# Patient Record
Sex: MALE | Race: WHITE | Employment: OTHER | URBAN - METROPOLITAN AREA
[De-identification: names, ages, dates, MRNs, and addresses within clinical notes are randomized per-mention and may not be internally consistent; named-entity substitution may affect disease eponyms.]

---

## 2017-04-11 ENCOUNTER — TELEPHONE (OUTPATIENT)
Dept: INTERNAL MEDICINE | Age: 72
End: 2017-04-11

## 2017-05-10 ENCOUNTER — OFFICE VISIT (OUTPATIENT)
Dept: CARDIOLOGY CLINIC | Age: 72
End: 2017-05-10

## 2017-05-10 VITALS
BODY MASS INDEX: 32.83 KG/M2 | WEIGHT: 235.4 LBS | SYSTOLIC BLOOD PRESSURE: 136 MMHG | DIASTOLIC BLOOD PRESSURE: 86 MMHG | HEART RATE: 84 BPM

## 2017-05-10 DIAGNOSIS — I48.0 PAROXYSMAL ATRIAL FIBRILLATION (HCC): Primary | ICD-10-CM

## 2017-05-10 PROCEDURE — 99204 OFFICE O/P NEW MOD 45 MIN: CPT | Performed by: INTERNAL MEDICINE

## 2017-05-10 PROCEDURE — 93000 ELECTROCARDIOGRAM COMPLETE: CPT | Performed by: INTERNAL MEDICINE

## 2017-05-10 RX ORDER — WARFARIN SODIUM 5 MG/1
5 TABLET ORAL DAILY
Qty: 135 TABLET | Refills: 3 | Status: SHIPPED | OUTPATIENT
Start: 2017-05-10 | End: 2018-06-28 | Stop reason: SDUPTHER

## 2017-05-10 ASSESSMENT — ENCOUNTER SYMPTOMS
ALLERGIC/IMMUNOLOGIC NEGATIVE: 1
EYES NEGATIVE: 1
GASTROINTESTINAL NEGATIVE: 1

## 2017-05-12 ENCOUNTER — TELEPHONE (OUTPATIENT)
Dept: INTERNAL MEDICINE | Age: 72
End: 2017-05-12

## 2017-05-15 ENCOUNTER — TELEPHONE (OUTPATIENT)
Dept: INTERNAL MEDICINE | Age: 72
End: 2017-05-15

## 2017-05-16 ENCOUNTER — OFFICE VISIT (OUTPATIENT)
Dept: INTERNAL MEDICINE | Age: 72
End: 2017-05-16

## 2017-05-16 VITALS
WEIGHT: 236 LBS | DIASTOLIC BLOOD PRESSURE: 70 MMHG | SYSTOLIC BLOOD PRESSURE: 122 MMHG | BODY MASS INDEX: 33.04 KG/M2 | HEIGHT: 71 IN

## 2017-05-16 DIAGNOSIS — I10 ESSENTIAL HYPERTENSION: ICD-10-CM

## 2017-05-16 DIAGNOSIS — M25.561 CHRONIC PAIN OF BOTH KNEES: ICD-10-CM

## 2017-05-16 DIAGNOSIS — M25.562 CHRONIC PAIN OF BOTH KNEES: ICD-10-CM

## 2017-05-16 DIAGNOSIS — E55.9 VITAMIN D DEFICIENCY: ICD-10-CM

## 2017-05-16 DIAGNOSIS — G89.29 CHRONIC PAIN OF BOTH KNEES: ICD-10-CM

## 2017-05-16 DIAGNOSIS — B91 POST-POLIO MUSCLE WEAKNESS: ICD-10-CM

## 2017-05-16 DIAGNOSIS — M25.422 SWELLING OF JOINT, ELBOW, LEFT: ICD-10-CM

## 2017-05-16 DIAGNOSIS — M62.81 POST-POLIO MUSCLE WEAKNESS: ICD-10-CM

## 2017-05-16 DIAGNOSIS — Z11.59 NEED FOR HEPATITIS C SCREENING TEST: ICD-10-CM

## 2017-05-16 DIAGNOSIS — E07.9 THYROID DISEASE: Primary | ICD-10-CM

## 2017-05-16 DIAGNOSIS — I48.0 PAROXYSMAL ATRIAL FIBRILLATION (HCC): ICD-10-CM

## 2017-05-16 DIAGNOSIS — M25.00 HEMARTHROSIS: ICD-10-CM

## 2017-05-16 DIAGNOSIS — E66.09 NON MORBID OBESITY DUE TO EXCESS CALORIES: ICD-10-CM

## 2017-05-16 DIAGNOSIS — E78.00 PURE HYPERCHOLESTEROLEMIA: ICD-10-CM

## 2017-05-16 PROCEDURE — 99214 OFFICE O/P EST MOD 30 MIN: CPT | Performed by: INTERNAL MEDICINE

## 2017-05-16 ASSESSMENT — PATIENT HEALTH QUESTIONNAIRE - PHQ9
SUM OF ALL RESPONSES TO PHQ9 QUESTIONS 1 & 2: 0
2. FEELING DOWN, DEPRESSED OR HOPELESS: 0
SUM OF ALL RESPONSES TO PHQ QUESTIONS 1-9: 0
1. LITTLE INTEREST OR PLEASURE IN DOING THINGS: 0

## 2017-05-17 ENCOUNTER — TELEPHONE (OUTPATIENT)
Dept: CARDIOLOGY CLINIC | Age: 72
End: 2017-05-17

## 2017-05-17 DIAGNOSIS — I48.0 PAROXYSMAL ATRIAL FIBRILLATION (HCC): ICD-10-CM

## 2017-05-17 DIAGNOSIS — I48.0 PAROXYSMAL ATRIAL FIBRILLATION (HCC): Primary | ICD-10-CM

## 2017-05-17 LAB
INR BLD: 1.08 (ref 0.85–1.15)
PROTHROMBIN TIME: 12.2 SEC (ref 9.6–13)

## 2017-05-18 ENCOUNTER — TELEPHONE (OUTPATIENT)
Dept: PHARMACY | Facility: CLINIC | Age: 72
End: 2017-05-18

## 2017-05-25 ENCOUNTER — TELEPHONE (OUTPATIENT)
Dept: INTERNAL MEDICINE | Age: 72
End: 2017-05-25

## 2017-05-25 ENCOUNTER — ANTI-COAG VISIT (OUTPATIENT)
Dept: PHARMACY | Facility: CLINIC | Age: 72
End: 2017-05-25

## 2017-05-25 DIAGNOSIS — I48.0 PAROXYSMAL ATRIAL FIBRILLATION (HCC): ICD-10-CM

## 2017-05-25 PROBLEM — M25.422 SWELLING OF JOINT, ELBOW, LEFT: Status: ACTIVE | Noted: 2017-05-25

## 2017-05-25 LAB — INTERNATIONAL NORMALIZATION RATIO, POC: 2.5

## 2017-05-25 ASSESSMENT — ENCOUNTER SYMPTOMS
BACK PAIN: 1
ABDOMINAL PAIN: 0
CHEST TIGHTNESS: 0
SHORTNESS OF BREATH: 0
RESPIRATORY NEGATIVE: 1

## 2017-05-26 ENCOUNTER — TELEPHONE (OUTPATIENT)
Dept: INTERNAL MEDICINE | Age: 72
End: 2017-05-26

## 2017-06-26 ENCOUNTER — TELEPHONE (OUTPATIENT)
Dept: PHARMACY | Facility: CLINIC | Age: 72
End: 2017-06-26

## 2017-06-27 DIAGNOSIS — E66.09 NON MORBID OBESITY DUE TO EXCESS CALORIES: ICD-10-CM

## 2017-06-27 DIAGNOSIS — E07.9 THYROID DISEASE: ICD-10-CM

## 2017-06-27 DIAGNOSIS — G89.29 CHRONIC PAIN OF BOTH KNEES: ICD-10-CM

## 2017-06-27 DIAGNOSIS — M25.561 CHRONIC PAIN OF BOTH KNEES: ICD-10-CM

## 2017-06-27 DIAGNOSIS — M25.562 CHRONIC PAIN OF BOTH KNEES: ICD-10-CM

## 2017-06-27 DIAGNOSIS — E78.00 PURE HYPERCHOLESTEROLEMIA: ICD-10-CM

## 2017-06-27 DIAGNOSIS — Z11.59 NEED FOR HEPATITIS C SCREENING TEST: ICD-10-CM

## 2017-06-27 DIAGNOSIS — I10 ESSENTIAL HYPERTENSION: ICD-10-CM

## 2017-06-27 DIAGNOSIS — E55.9 VITAMIN D DEFICIENCY: ICD-10-CM

## 2017-06-27 DIAGNOSIS — I48.0 PAROXYSMAL ATRIAL FIBRILLATION (HCC): ICD-10-CM

## 2017-06-27 DIAGNOSIS — M25.00 HEMARTHROSIS: ICD-10-CM

## 2017-06-27 LAB
A/G RATIO: 1.5 (ref 1.1–2.2)
ALBUMIN SERPL-MCNC: 4.3 G/DL (ref 3.4–5)
ALP BLD-CCNC: 82 U/L (ref 40–129)
ALT SERPL-CCNC: 19 U/L (ref 10–40)
ANION GAP SERPL CALCULATED.3IONS-SCNC: 13 MMOL/L (ref 3–16)
AST SERPL-CCNC: 21 U/L (ref 15–37)
BASOPHILS ABSOLUTE: 0.1 K/UL (ref 0–0.2)
BASOPHILS RELATIVE PERCENT: 1.1 %
BILIRUB SERPL-MCNC: 0.5 MG/DL (ref 0–1)
BUN BLDV-MCNC: 12 MG/DL (ref 7–20)
CALCIUM SERPL-MCNC: 10 MG/DL (ref 8.3–10.6)
CHLORIDE BLD-SCNC: 100 MMOL/L (ref 99–110)
CHOLESTEROL, TOTAL: 160 MG/DL (ref 0–199)
CO2: 28 MMOL/L (ref 21–32)
CREAT SERPL-MCNC: 0.5 MG/DL (ref 0.8–1.3)
CREATININE URINE: 121.8 MG/DL (ref 39–259)
EOSINOPHILS ABSOLUTE: 0.2 K/UL (ref 0–0.6)
EOSINOPHILS RELATIVE PERCENT: 2.3 %
GFR AFRICAN AMERICAN: >60
GFR NON-AFRICAN AMERICAN: >60
GLOBULIN: 2.9 G/DL
GLUCOSE BLD-MCNC: 102 MG/DL (ref 70–99)
HCT VFR BLD CALC: 49.6 % (ref 40.5–52.5)
HDLC SERPL-MCNC: 48 MG/DL (ref 40–60)
HEMOGLOBIN: 16.3 G/DL (ref 13.5–17.5)
HEPATITIS C ANTIBODY INTERPRETATION: NORMAL
LDL CHOLESTEROL CALCULATED: 93 MG/DL
LYMPHOCYTES ABSOLUTE: 1.6 K/UL (ref 1–5.1)
LYMPHOCYTES RELATIVE PERCENT: 21.9 %
MCH RBC QN AUTO: 30.6 PG (ref 26–34)
MCHC RBC AUTO-ENTMCNC: 32.8 G/DL (ref 31–36)
MCV RBC AUTO: 93.3 FL (ref 80–100)
MICROALBUMIN UR-MCNC: <1.2 MG/DL
MICROALBUMIN/CREAT UR-RTO: NORMAL MG/G (ref 0–30)
MONOCYTES ABSOLUTE: 0.7 K/UL (ref 0–1.3)
MONOCYTES RELATIVE PERCENT: 9.9 %
NEUTROPHILS ABSOLUTE: 4.8 K/UL (ref 1.7–7.7)
NEUTROPHILS RELATIVE PERCENT: 64.8 %
PDW BLD-RTO: 13.5 % (ref 12.4–15.4)
PLATELET # BLD: 245 K/UL (ref 135–450)
PMV BLD AUTO: 9.7 FL (ref 5–10.5)
POTASSIUM SERPL-SCNC: 5.1 MMOL/L (ref 3.5–5.1)
RBC # BLD: 5.32 M/UL (ref 4.2–5.9)
SODIUM BLD-SCNC: 141 MMOL/L (ref 136–145)
T4 FREE: 1.6 NG/DL (ref 0.9–1.8)
TOTAL PROTEIN: 7.2 G/DL (ref 6.4–8.2)
TRIGL SERPL-MCNC: 97 MG/DL (ref 0–150)
TSH SERPL DL<=0.05 MIU/L-ACNC: 1.6 UIU/ML (ref 0.27–4.2)
VITAMIN D 25-HYDROXY: 22 NG/ML
VLDLC SERPL CALC-MCNC: 19 MG/DL
WBC # BLD: 7.4 K/UL (ref 4–11)

## 2017-07-03 ENCOUNTER — ANTI-COAG VISIT (OUTPATIENT)
Dept: PHARMACY | Facility: CLINIC | Age: 72
End: 2017-07-03

## 2017-07-03 DIAGNOSIS — I48.0 PAROXYSMAL ATRIAL FIBRILLATION (HCC): ICD-10-CM

## 2017-07-03 LAB — INTERNATIONAL NORMALIZATION RATIO, POC: 2.7

## 2017-07-03 RX ORDER — ATENOLOL 50 MG/1
TABLET ORAL
Qty: 90 TABLET | Refills: 0 | Status: SHIPPED | OUTPATIENT
Start: 2017-07-03 | End: 2017-07-07 | Stop reason: SDUPTHER

## 2017-07-07 ENCOUNTER — OFFICE VISIT (OUTPATIENT)
Dept: INTERNAL MEDICINE | Age: 72
End: 2017-07-07

## 2017-07-07 VITALS
WEIGHT: 235 LBS | HEIGHT: 71 IN | DIASTOLIC BLOOD PRESSURE: 70 MMHG | BODY MASS INDEX: 32.9 KG/M2 | SYSTOLIC BLOOD PRESSURE: 118 MMHG

## 2017-07-07 DIAGNOSIS — E07.9 THYROID DISEASE: ICD-10-CM

## 2017-07-07 DIAGNOSIS — I48.0 PAROXYSMAL ATRIAL FIBRILLATION (HCC): ICD-10-CM

## 2017-07-07 DIAGNOSIS — K04.7 TOOTH ABSCESS: ICD-10-CM

## 2017-07-07 DIAGNOSIS — B91 POST-POLIO MUSCLE WEAKNESS: ICD-10-CM

## 2017-07-07 DIAGNOSIS — H57.9 EYE DISEASE: ICD-10-CM

## 2017-07-07 DIAGNOSIS — Z00.00 WELL ADULT EXAM: Primary | ICD-10-CM

## 2017-07-07 DIAGNOSIS — K59.01 SLOW TRANSIT CONSTIPATION: ICD-10-CM

## 2017-07-07 DIAGNOSIS — E66.09 NON MORBID OBESITY DUE TO EXCESS CALORIES: ICD-10-CM

## 2017-07-07 DIAGNOSIS — I10 ESSENTIAL HYPERTENSION: ICD-10-CM

## 2017-07-07 DIAGNOSIS — N20.0 KIDNEY STONES: ICD-10-CM

## 2017-07-07 DIAGNOSIS — L85.3 DRY SKIN: ICD-10-CM

## 2017-07-07 DIAGNOSIS — M62.81 POST-POLIO MUSCLE WEAKNESS: ICD-10-CM

## 2017-07-07 DIAGNOSIS — E78.00 PURE HYPERCHOLESTEROLEMIA: ICD-10-CM

## 2017-07-07 DIAGNOSIS — Z00.00 ROUTINE GENERAL MEDICAL EXAMINATION AT A HEALTH CARE FACILITY: ICD-10-CM

## 2017-07-07 PROCEDURE — 99214 OFFICE O/P EST MOD 30 MIN: CPT | Performed by: INTERNAL MEDICINE

## 2017-07-07 PROCEDURE — G0439 PPPS, SUBSEQ VISIT: HCPCS | Performed by: INTERNAL MEDICINE

## 2017-07-07 RX ORDER — SIMVASTATIN 80 MG
80 TABLET ORAL NIGHTLY
Qty: 90 TABLET | Refills: 3 | Status: SHIPPED | OUTPATIENT
Start: 2017-07-07 | End: 2018-02-28 | Stop reason: SDUPTHER

## 2017-07-07 RX ORDER — ATENOLOL 50 MG/1
TABLET ORAL
Qty: 90 TABLET | Refills: 3 | Status: SHIPPED | OUTPATIENT
Start: 2017-07-07 | End: 2019-07-19

## 2017-07-07 RX ORDER — ATENOLOL 50 MG/1
TABLET ORAL
Qty: 90 TABLET | Refills: 3 | Status: SHIPPED | OUTPATIENT
Start: 2017-07-07 | End: 2017-07-07 | Stop reason: SDUPTHER

## 2017-07-07 ASSESSMENT — ENCOUNTER SYMPTOMS
CHEST TIGHTNESS: 0
SINUS PRESSURE: 0
BACK PAIN: 0
SHORTNESS OF BREATH: 0
WHEEZING: 0
RESPIRATORY NEGATIVE: 1
CONSTIPATION: 1
STRIDOR: 0
ABDOMINAL PAIN: 0

## 2017-07-07 ASSESSMENT — PATIENT HEALTH QUESTIONNAIRE - PHQ9: SUM OF ALL RESPONSES TO PHQ QUESTIONS 1-9: 1

## 2017-07-07 ASSESSMENT — LIFESTYLE VARIABLES
HOW OFTEN DO YOU HAVE SIX OR MORE DRINKS ON ONE OCCASION: 0
HOW MANY STANDARD DRINKS CONTAINING ALCOHOL DO YOU HAVE ON A TYPICAL DAY: 0
AUDIT-C TOTAL SCORE: 4
HOW OFTEN DO YOU HAVE A DRINK CONTAINING ALCOHOL: 4

## 2017-07-07 ASSESSMENT — ANXIETY QUESTIONNAIRES: GAD7 TOTAL SCORE: 1

## 2017-07-31 ENCOUNTER — ANTI-COAG VISIT (OUTPATIENT)
Dept: PHARMACY | Facility: CLINIC | Age: 72
End: 2017-07-31

## 2017-07-31 DIAGNOSIS — I48.0 PAROXYSMAL ATRIAL FIBRILLATION (HCC): ICD-10-CM

## 2017-07-31 LAB — INTERNATIONAL NORMALIZATION RATIO, POC: 2.9

## 2017-08-30 ENCOUNTER — ANTI-COAG VISIT (OUTPATIENT)
Dept: PHARMACY | Facility: CLINIC | Age: 72
End: 2017-08-30

## 2017-08-30 ENCOUNTER — OFFICE VISIT (OUTPATIENT)
Dept: CARDIOLOGY CLINIC | Age: 72
End: 2017-08-30

## 2017-08-30 VITALS
WEIGHT: 237.6 LBS | BODY MASS INDEX: 33.26 KG/M2 | DIASTOLIC BLOOD PRESSURE: 78 MMHG | SYSTOLIC BLOOD PRESSURE: 122 MMHG | OXYGEN SATURATION: 98 % | HEART RATE: 79 BPM | HEIGHT: 71 IN

## 2017-08-30 DIAGNOSIS — I10 ESSENTIAL HYPERTENSION: ICD-10-CM

## 2017-08-30 DIAGNOSIS — I48.0 PAROXYSMAL ATRIAL FIBRILLATION (HCC): ICD-10-CM

## 2017-08-30 DIAGNOSIS — I48.91 ATRIAL FIBRILLATION, UNSPECIFIED TYPE (HCC): Primary | ICD-10-CM

## 2017-08-30 LAB — INTERNATIONAL NORMALIZATION RATIO, POC: 3.1

## 2017-08-30 PROCEDURE — 93000 ELECTROCARDIOGRAM COMPLETE: CPT | Performed by: INTERNAL MEDICINE

## 2017-08-30 PROCEDURE — 99214 OFFICE O/P EST MOD 30 MIN: CPT | Performed by: INTERNAL MEDICINE

## 2017-09-03 DIAGNOSIS — N20.0 KIDNEY STONES: ICD-10-CM

## 2017-09-03 DIAGNOSIS — B91 POST-POLIO MUSCLE WEAKNESS: ICD-10-CM

## 2017-09-03 DIAGNOSIS — M62.81 POST-POLIO MUSCLE WEAKNESS: ICD-10-CM

## 2017-09-03 DIAGNOSIS — E07.9 THYROID DISEASE: ICD-10-CM

## 2017-09-03 DIAGNOSIS — K04.7 TOOTH ABSCESS: ICD-10-CM

## 2017-09-05 RX ORDER — SIMVASTATIN 80 MG
TABLET ORAL
Qty: 90 TABLET | Refills: 3 | Status: SHIPPED | OUTPATIENT
Start: 2017-09-05 | End: 2018-07-02 | Stop reason: SDUPTHER

## 2017-09-20 ENCOUNTER — ANTI-COAG VISIT (OUTPATIENT)
Dept: PHARMACY | Facility: CLINIC | Age: 72
End: 2017-09-20

## 2017-09-20 DIAGNOSIS — I48.91 ATRIAL FIBRILLATION, UNSPECIFIED TYPE (HCC): ICD-10-CM

## 2017-09-20 LAB — INTERNATIONAL NORMALIZATION RATIO, POC: 3.1

## 2017-10-18 ENCOUNTER — ANTI-COAG VISIT (OUTPATIENT)
Dept: PHARMACY | Facility: CLINIC | Age: 72
End: 2017-10-18

## 2017-10-18 DIAGNOSIS — I48.91 ATRIAL FIBRILLATION, UNSPECIFIED TYPE (HCC): ICD-10-CM

## 2017-10-18 LAB — INTERNATIONAL NORMALIZATION RATIO, POC: 2.8

## 2017-10-18 NOTE — PROGRESS NOTES
~10-15 years for a-fib, and reports typically stable INR levels and dosing regimen. Repeat INR in 4 weeks (patient prefers monthly visits due to cost burden). Patient was instructed to maintain consistent vitamin K intake and call with any bleeding, medication changes, or fever/vomiting/diarrhea. Next Clinic Appointment:  11/15    Please call Kiah at [phone] with any questions. Thanks! Yoselin Xavier.  Maycol Moore, PharmD  [facility]  Ph: [phone]  10/18/2017 9:28 AM

## 2017-11-01 ENCOUNTER — HOSPITAL ENCOUNTER (OUTPATIENT)
Dept: OTHER | Age: 72
Discharge: HOME | End: 2017-11-30
Attending: INTERNAL MEDICINE | Admitting: INTERNAL MEDICINE

## 2017-11-15 ENCOUNTER — ANTI-COAG VISIT (OUTPATIENT)
Dept: PHARMACY | Facility: CLINIC | Age: 72
End: 2017-11-15

## 2017-11-15 DIAGNOSIS — I48.91 ATRIAL FIBRILLATION, UNSPECIFIED TYPE (HCC): ICD-10-CM

## 2017-11-15 LAB — INTERNATIONAL NORMALIZATION RATIO, POC: 2.2

## 2017-11-15 NOTE — PROGRESS NOTES
ANTICOAGULATION SERVICE    Dana Brink is a 67 y.o. male with PMHx significant for A-fib, HTN, HLD who presents to clinic 11/15/2017 for anticoagulation monitoring and adjustment. Anticoagulation Indication(s):  Afib    Referring Physician:  Dr. Wilks Favorite  Goal INR Range:  2-3  Duration of Anticoagulation Therapy:  Indefinite  Time of day dose taken:  PM  Product patient has at home:  warfarin 5 mg (peach)      INR Summary                           Warfarin regimen (mg)  Date INR   A/P   Sun Mon Tue Wed Thu Fri Sat Mg/wk  11/15 2.2 At goal, no change 5 7.5 5 5 5 5 5 37.5  10/18 2.8 At goal, no change 5 7.5 5 5 5 5 5 37.5  9/20 3.1 Above goal, decrease 5 7.5 5 5 5 5 5 37.5  8/30 3.1 Above goal, decrease 5 7.5 5 5 5 7.5 5 40  7/31 2.9 At goal, no change 5 7.5 5 7.5 5 7.5 5 42.5  7/3 2.7 At goal, no change 5 7.5 5 7.5 5 7.5 5 42.5  5/25 2.5 At goal, no change 5 7.5 5 7.5 5 7.5 5 42.5    Patient History:  Recent hospitalizations/HC visits None since last visit   Recent medication changes None reported today   Medications taken regularly that may interact with warfarin or alter INR ASA 81 mg, levothyroxine   Warfarin dose taken as prescribed Yes - does not use pillbox, but has been taking warfarin for ~10-15 years for a-fib and reports compliance   Signs/symptoms of bleeding No h/o major bleeding requiring ED visit or hospitalization   Vitamin K intake Normally has ~1-2 serving of green, leafy vegetables per week (usually broccoli once per week)   Recent vomiting/diarrhea/fever, changes in weight or activity level None reported   Tobacco or alcohol use Patient reports quitting smoking 47 years ago  Patient reports having 1-2 beers or shot of whiskey per day   Upcoming surgeries or procedures None reported     Assessment/Plan:  Patient's INR was therapeutic today (2.2). Nikos Cisneros was instructed to continue warfarin 7.5 mg on Mondays, and 5 mg all other days.   Patient states he has been taking warfarin for ~10-15 years for a-fib, and reports typically stable INR levels and dosing regimen. Repeat INR in 4 weeks (patient prefers monthly visits due to cost burden). Patient was instructed to maintain consistent vitamin K intake and call with any bleeding, medication changes, or fever/vomiting/diarrhea. Next Clinic Appointment:  12/13    Please call Kiah at [phone] with any questions. Thanks! Mehul Perez, PharmD  [facility]  Ph: [phone]  11/15/2017 9:30 AM

## 2017-12-01 ENCOUNTER — HOSPITAL ENCOUNTER (OUTPATIENT)
Dept: OTHER | Age: 72
Discharge: HOME | End: 2017-12-31
Attending: INTERNAL MEDICINE | Admitting: INTERNAL MEDICINE

## 2017-12-13 ENCOUNTER — ANTI-COAG VISIT (OUTPATIENT)
Dept: PHARMACY | Facility: CLINIC | Age: 72
End: 2017-12-13

## 2017-12-13 DIAGNOSIS — I48.91 ATRIAL FIBRILLATION, UNSPECIFIED TYPE (HCC): ICD-10-CM

## 2017-12-13 LAB — INTERNATIONAL NORMALIZATION RATIO, POC: 2.4

## 2017-12-13 NOTE — PROGRESS NOTES
Patient states he has been taking warfarin for ~10-15 years for a-fib, and reports typically stable INR levels and dosing regimen. Repeat INR in 4 weeks (patient prefers monthly visits due to cost burden). Patient was instructed to maintain consistent vitamin K intake and call with any bleeding, medication changes, or fever/vomiting/diarrhea. Next Clinic Appointment:  1/17    Please call Kiah at [phone] with any questions. Thanks! Ree Majors.  Christiana Bethea, PharmD  [facility]  Ph: [phone]  12/13/2017 9:53 AM

## 2018-01-01 ENCOUNTER — HOSPITAL ENCOUNTER (OUTPATIENT)
Dept: OTHER | Age: 73
Discharge: HOME | End: 2018-01-31
Attending: INTERNAL MEDICINE | Admitting: INTERNAL MEDICINE

## 2018-01-15 ENCOUNTER — TELEPHONE (OUTPATIENT)
Dept: INTERNAL MEDICINE | Age: 73
End: 2018-01-15

## 2018-01-15 NOTE — LETTER
[facility] [address]  [address], [address] [place] [address]  Phone: [phone]  Fax: [phone]    Morena Kemp MD        January 15, 2018     Patient: Lindsey Croft   YOB: 1945   Date of Visit: 1/15/2018       To Whom It May Concern: It is my medical opinion that Ksenia Grayson requires a handicap placard due to severe bilateral weakness/pain. This prevents him from walking more than 1 block. and is a permanent condition. .    If you have any questions or concerns, please don't hesitate to call.     Sincerely,        Morena Kemp MD   [facility] license #[id number]

## 2018-01-17 ENCOUNTER — ANTI-COAG VISIT (OUTPATIENT)
Dept: PHARMACY | Facility: CLINIC | Age: 73
End: 2018-01-17

## 2018-01-17 DIAGNOSIS — I48.91 ATRIAL FIBRILLATION, UNSPECIFIED TYPE (HCC): ICD-10-CM

## 2018-01-17 LAB — INTERNATIONAL NORMALIZATION RATIO, POC: 2.1

## 2018-01-17 NOTE — PROGRESS NOTES
warfarin 7.5 mg on Mondays, and 5 mg all other days. Patient states he has been taking warfarin for ~10-15 years for a-fib, and reports typically stable INR levels and dosing regimen. Repeat INR in 6 weeks (patient prefers extended visits due to cost burden). Patient was instructed to maintain consistent vitamin K intake and call with any bleeding, medication changes, or fever/vomiting/diarrhea. Next Clinic Appointment:  2/28 - coordinate with cardio visit    Please call Kiah at [phone] with any questions. Thanks! Rosangela Rhodes.  Ximena Slater, PharmD  [facility]  Ph: [phone]  1/17/2018 9:41 AM

## 2018-02-01 ENCOUNTER — HOSPITAL ENCOUNTER (OUTPATIENT)
Dept: OTHER | Age: 73
Discharge: HOME | End: 2018-02-28
Attending: INTERNAL MEDICINE | Admitting: INTERNAL MEDICINE

## 2018-02-27 DIAGNOSIS — I48.91 ATRIAL FIBRILLATION, UNSPECIFIED TYPE (HCC): Primary | ICD-10-CM

## 2018-02-28 ENCOUNTER — ANTI-COAG VISIT (OUTPATIENT)
Dept: PHARMACY | Facility: CLINIC | Age: 73
End: 2018-02-28

## 2018-02-28 ENCOUNTER — OFFICE VISIT (OUTPATIENT)
Dept: CARDIOLOGY CLINIC | Age: 73
End: 2018-02-28

## 2018-02-28 VITALS
DIASTOLIC BLOOD PRESSURE: 72 MMHG | OXYGEN SATURATION: 97 % | WEIGHT: 244 LBS | HEIGHT: 72 IN | HEART RATE: 93 BPM | RESPIRATION RATE: 16 BRPM | SYSTOLIC BLOOD PRESSURE: 110 MMHG | BODY MASS INDEX: 33.05 KG/M2

## 2018-02-28 DIAGNOSIS — I48.91 ATRIAL FIBRILLATION, UNSPECIFIED TYPE (HCC): ICD-10-CM

## 2018-02-28 DIAGNOSIS — E07.9 THYROID DISEASE: ICD-10-CM

## 2018-02-28 DIAGNOSIS — I48.91 ATRIAL FIBRILLATION, UNSPECIFIED TYPE (HCC): Primary | ICD-10-CM

## 2018-02-28 LAB — INTERNATIONAL NORMALIZATION RATIO, POC: 2.5

## 2018-02-28 PROCEDURE — 99213 OFFICE O/P EST LOW 20 MIN: CPT | Performed by: INTERNAL MEDICINE

## 2018-02-28 NOTE — PROGRESS NOTES
89           No diagnosis found. Review of Systems   Constitutional: Negative. HENT: Negative. Eyes: Negative. Cardiovascular: Negative. Gastrointestinal: Negative. Endocrine: Negative. Genitourinary: Negative. Musculoskeletal: Negative. Skin: Negative. Allergic/Immunologic: Negative. Neurological: Negative. Hematological: Negative. Psychiatric/Behavioral: Negative. Objective:   Physical Exam   Constitutional: He is oriented to person, place, and time. He appears well-developed and well-nourished. HENT:   Head: Normocephalic and atraumatic. Eyes: EOM are normal. Pupils are equal, round, and reactive to light. Neck: Normal range of motion. Neck supple. Cardiovascular: Normal rate and regular rhythm. Exam reveals no friction rub. No murmur heard. Pulmonary/Chest: Effort normal and breath sounds normal.   Abdominal: Soft. Bowel sounds are normal.   Musculoskeletal: Normal range of motion. He exhibits no edema or deformity. Neurological: He is alert and oriented to person, place, and time. Skin: Skin is warm and dry. Psychiatric: He has a normal mood and affect. His behavior is normal. Thought content normal.       Assessment:      Patient Active Problem List   Diagnosis    Thyroid disease    Constipation    Post-polio muscle weakness    Kidney stones    Atrial fibrillation (HCC)    Hyperlipidemia    Eye disease    Chronic pain of both knees    Testosterone deficiency    Fistula-in-ano    Hypertension    Non morbid obesity due to excess calories    Swelling of joint, elbow, left    Dry skin           Plan:      History of graves disease and had orbital decompression with resultant double vision. Had full polio. Has AF. ECG with late transition. Takes synthroid. SOB:  Seems controlled  Cholesterol:  LDL 93 in 6/17 and takes simvastatin 80 mg. Snores occasionally but doesn't exhibit all KATARINA.

## 2018-03-01 ENCOUNTER — HOSPITAL ENCOUNTER (OUTPATIENT)
Dept: OTHER | Age: 73
Discharge: HOME | End: 2018-03-31
Attending: INTERNAL MEDICINE | Admitting: INTERNAL MEDICINE

## 2018-04-11 ENCOUNTER — TELEPHONE (OUTPATIENT)
Dept: PHARMACY | Facility: CLINIC | Age: 73
End: 2018-04-11

## 2018-04-11 DIAGNOSIS — I48.91 ATRIAL FIBRILLATION, UNSPECIFIED TYPE (HCC): ICD-10-CM

## 2018-04-11 DIAGNOSIS — K60.3 FISTULA-IN-ANO: Primary | ICD-10-CM

## 2018-04-12 ENCOUNTER — TELEPHONE (OUTPATIENT)
Dept: INTERNAL MEDICINE | Age: 73
End: 2018-04-12

## 2018-04-12 DIAGNOSIS — I48.91 ATRIAL FIBRILLATION, UNSPECIFIED TYPE (HCC): ICD-10-CM

## 2018-04-12 DIAGNOSIS — K60.3 FISTULA-IN-ANO: ICD-10-CM

## 2018-04-13 ENCOUNTER — TELEPHONE (OUTPATIENT)
Dept: CARDIOLOGY CLINIC | Age: 73
End: 2018-04-13

## 2018-04-13 ENCOUNTER — OFFICE VISIT (OUTPATIENT)
Dept: INTERNAL MEDICINE | Age: 73
End: 2018-04-13

## 2018-04-13 VITALS
SYSTOLIC BLOOD PRESSURE: 138 MMHG | HEIGHT: 72 IN | DIASTOLIC BLOOD PRESSURE: 78 MMHG | WEIGHT: 238 LBS | BODY MASS INDEX: 32.23 KG/M2

## 2018-04-13 DIAGNOSIS — E66.09 NON MORBID OBESITY DUE TO EXCESS CALORIES: ICD-10-CM

## 2018-04-13 DIAGNOSIS — M62.81 POST-POLIO MUSCLE WEAKNESS: ICD-10-CM

## 2018-04-13 DIAGNOSIS — I48.91 ATRIAL FIBRILLATION, UNSPECIFIED TYPE (HCC): ICD-10-CM

## 2018-04-13 DIAGNOSIS — B91 POST-POLIO MUSCLE WEAKNESS: ICD-10-CM

## 2018-04-13 DIAGNOSIS — E55.9 VITAMIN D DEFICIENCY: ICD-10-CM

## 2018-04-13 DIAGNOSIS — I10 ESSENTIAL HYPERTENSION: ICD-10-CM

## 2018-04-13 DIAGNOSIS — E07.9 THYROID DISEASE: ICD-10-CM

## 2018-04-13 PROCEDURE — 99214 OFFICE O/P EST MOD 30 MIN: CPT | Performed by: INTERNAL MEDICINE

## 2018-04-13 ASSESSMENT — ENCOUNTER SYMPTOMS
BLOOD IN STOOL: 1
RESPIRATORY NEGATIVE: 1
CHEST TIGHTNESS: 0
COUGH: 0
ABDOMINAL PAIN: 0
SHORTNESS OF BREATH: 0

## 2018-06-01 ENCOUNTER — NURSE ONLY (OUTPATIENT)
Dept: CARDIOLOGY CLINIC | Age: 73
End: 2018-06-01

## 2018-06-01 ENCOUNTER — OFFICE VISIT (OUTPATIENT)
Dept: CARDIOLOGY CLINIC | Age: 73
End: 2018-06-01

## 2018-06-01 VITALS
OXYGEN SATURATION: 95 % | HEART RATE: 75 BPM | SYSTOLIC BLOOD PRESSURE: 110 MMHG | BODY MASS INDEX: 33.01 KG/M2 | WEIGHT: 240 LBS | DIASTOLIC BLOOD PRESSURE: 80 MMHG

## 2018-06-01 DIAGNOSIS — I48.91 ATRIAL FIBRILLATION, UNSPECIFIED TYPE (HCC): Primary | ICD-10-CM

## 2018-06-01 PROCEDURE — 93000 ELECTROCARDIOGRAM COMPLETE: CPT | Performed by: INTERNAL MEDICINE

## 2018-06-01 PROCEDURE — 99214 OFFICE O/P EST MOD 30 MIN: CPT | Performed by: INTERNAL MEDICINE

## 2018-06-06 RX ORDER — WARFARIN SODIUM 2 MG/1
2 TABLET ORAL DAILY
Qty: 90 TABLET | Refills: 5 | Status: SHIPPED | OUTPATIENT
Start: 2018-06-06 | End: 2020-01-09

## 2018-06-07 ENCOUNTER — ANTI-COAG VISIT (OUTPATIENT)
Dept: PHARMACY | Facility: CLINIC | Age: 73
End: 2018-06-07

## 2018-06-07 ENCOUNTER — HOSPITAL ENCOUNTER (OUTPATIENT)
Dept: OTHER | Age: 73
Discharge: HOME | End: 2018-06-30
Attending: INTERNAL MEDICINE | Admitting: INTERNAL MEDICINE

## 2018-06-07 DIAGNOSIS — I48.91 ATRIAL FIBRILLATION, UNSPECIFIED TYPE (HCC): ICD-10-CM

## 2018-06-07 LAB — INTERNATIONAL NORMALIZATION RATIO, POC: 1.2

## 2018-06-20 ENCOUNTER — ANTI-COAG VISIT (OUTPATIENT)
Dept: PHARMACY | Facility: CLINIC | Age: 73
End: 2018-06-20

## 2018-06-20 DIAGNOSIS — E07.9 THYROID DISEASE: ICD-10-CM

## 2018-06-20 DIAGNOSIS — E55.9 VITAMIN D DEFICIENCY: ICD-10-CM

## 2018-06-20 DIAGNOSIS — I10 ESSENTIAL HYPERTENSION: ICD-10-CM

## 2018-06-20 DIAGNOSIS — I48.91 ATRIAL FIBRILLATION, UNSPECIFIED TYPE (HCC): ICD-10-CM

## 2018-06-20 DIAGNOSIS — M62.81 POST-POLIO MUSCLE WEAKNESS: ICD-10-CM

## 2018-06-20 DIAGNOSIS — E66.09 NON MORBID OBESITY DUE TO EXCESS CALORIES: ICD-10-CM

## 2018-06-20 DIAGNOSIS — B91 POST-POLIO MUSCLE WEAKNESS: ICD-10-CM

## 2018-06-20 LAB
A/G RATIO: 1.5 (ref 1.1–2.2)
ALBUMIN SERPL-MCNC: 4.1 G/DL (ref 3.4–5)
ALP BLD-CCNC: 71 U/L (ref 40–129)
ALT SERPL-CCNC: 18 U/L (ref 10–40)
ANION GAP SERPL CALCULATED.3IONS-SCNC: 14 MMOL/L (ref 3–16)
AST SERPL-CCNC: 20 U/L (ref 15–37)
BASOPHILS ABSOLUTE: 0.1 K/UL (ref 0–0.2)
BASOPHILS RELATIVE PERCENT: 1.1 %
BILIRUB SERPL-MCNC: 0.5 MG/DL (ref 0–1)
BUN BLDV-MCNC: 15 MG/DL (ref 7–20)
CALCIUM SERPL-MCNC: 9.6 MG/DL (ref 8.3–10.6)
CHLORIDE BLD-SCNC: 100 MMOL/L (ref 99–110)
CHOLESTEROL, TOTAL: 157 MG/DL (ref 0–199)
CO2: 27 MMOL/L (ref 21–32)
CREAT SERPL-MCNC: 0.6 MG/DL (ref 0.8–1.3)
EOSINOPHILS ABSOLUTE: 0.2 K/UL (ref 0–0.6)
EOSINOPHILS RELATIVE PERCENT: 3.2 %
GFR AFRICAN AMERICAN: >60
GFR NON-AFRICAN AMERICAN: >60
GLOBULIN: 2.7 G/DL
GLUCOSE BLD-MCNC: 105 MG/DL (ref 70–99)
HCT VFR BLD CALC: 47 % (ref 40.5–52.5)
HDLC SERPL-MCNC: 52 MG/DL (ref 40–60)
HEMOGLOBIN: 16.4 G/DL (ref 13.5–17.5)
INTERNATIONAL NORMALIZATION RATIO, POC: 1.2
LDL CHOLESTEROL CALCULATED: 83 MG/DL
LYMPHOCYTES ABSOLUTE: 1.8 K/UL (ref 1–5.1)
LYMPHOCYTES RELATIVE PERCENT: 23.5 %
MCH RBC QN AUTO: 32.1 PG (ref 26–34)
MCHC RBC AUTO-ENTMCNC: 34.9 G/DL (ref 31–36)
MCV RBC AUTO: 92 FL (ref 80–100)
MONOCYTES ABSOLUTE: 0.7 K/UL (ref 0–1.3)
MONOCYTES RELATIVE PERCENT: 9.6 %
NEUTROPHILS ABSOLUTE: 4.8 K/UL (ref 1.7–7.7)
NEUTROPHILS RELATIVE PERCENT: 62.6 %
PDW BLD-RTO: 13.5 % (ref 12.4–15.4)
PLATELET # BLD: 236 K/UL (ref 135–450)
PMV BLD AUTO: 9.5 FL (ref 5–10.5)
POTASSIUM SERPL-SCNC: 4.5 MMOL/L (ref 3.5–5.1)
RBC # BLD: 5.12 M/UL (ref 4.2–5.9)
SODIUM BLD-SCNC: 141 MMOL/L (ref 136–145)
T3 FREE: 2.9 PG/ML (ref 2.3–4.2)
T4 FREE: 1.6 NG/DL (ref 0.9–1.8)
TOTAL PROTEIN: 6.8 G/DL (ref 6.4–8.2)
TRIGL SERPL-MCNC: 112 MG/DL (ref 0–150)
TSH SERPL DL<=0.05 MIU/L-ACNC: 1.49 UIU/ML (ref 0.27–4.2)
VITAMIN D 25-HYDROXY: 40.1 NG/ML
VLDLC SERPL CALC-MCNC: 22 MG/DL
WBC # BLD: 7.6 K/UL (ref 4–11)

## 2018-06-25 RX ORDER — WARFARIN SODIUM 5 MG/1
5 TABLET ORAL DAILY
Qty: 135 TABLET | Refills: 3 | Status: CANCELLED | OUTPATIENT
Start: 2018-06-25

## 2018-06-27 ENCOUNTER — TELEPHONE (OUTPATIENT)
Dept: INTERNAL MEDICINE | Age: 73
End: 2018-06-27

## 2018-06-28 ENCOUNTER — TELEPHONE (OUTPATIENT)
Dept: INTERNAL MEDICINE | Age: 73
End: 2018-06-28

## 2018-06-28 RX ORDER — WARFARIN SODIUM 5 MG/1
5 TABLET ORAL DAILY
Qty: 135 TABLET | Refills: 2 | Status: SHIPPED | OUTPATIENT
Start: 2018-06-28 | End: 2019-12-16 | Stop reason: SDUPTHER

## 2018-07-01 ENCOUNTER — HOSPITAL ENCOUNTER (OUTPATIENT)
Dept: OTHER | Age: 73
Discharge: HOME | End: 2018-07-01
Attending: INTERNAL MEDICINE | Admitting: INTERNAL MEDICINE

## 2018-07-02 ENCOUNTER — OFFICE VISIT (OUTPATIENT)
Dept: INTERNAL MEDICINE | Age: 73
End: 2018-07-02

## 2018-07-02 ENCOUNTER — ANTI-COAG VISIT (OUTPATIENT)
Dept: PHARMACY | Facility: CLINIC | Age: 73
End: 2018-07-02

## 2018-07-02 VITALS
SYSTOLIC BLOOD PRESSURE: 128 MMHG | HEIGHT: 71 IN | BODY MASS INDEX: 33.32 KG/M2 | DIASTOLIC BLOOD PRESSURE: 80 MMHG | WEIGHT: 238 LBS

## 2018-07-02 DIAGNOSIS — B91 POST-POLIO MUSCLE WEAKNESS: ICD-10-CM

## 2018-07-02 DIAGNOSIS — M25.562 CHRONIC PAIN OF BOTH KNEES: ICD-10-CM

## 2018-07-02 DIAGNOSIS — E66.09 NON MORBID OBESITY DUE TO EXCESS CALORIES: ICD-10-CM

## 2018-07-02 DIAGNOSIS — Z00.00 WELL ADULT EXAM: Primary | ICD-10-CM

## 2018-07-02 DIAGNOSIS — M62.81 POST-POLIO MUSCLE WEAKNESS: ICD-10-CM

## 2018-07-02 DIAGNOSIS — L30.9 DERMATITIS: ICD-10-CM

## 2018-07-02 DIAGNOSIS — N20.0 KIDNEY STONES: ICD-10-CM

## 2018-07-02 DIAGNOSIS — E07.9 THYROID DISEASE: ICD-10-CM

## 2018-07-02 DIAGNOSIS — E78.00 PURE HYPERCHOLESTEROLEMIA: ICD-10-CM

## 2018-07-02 DIAGNOSIS — I48.91 ATRIAL FIBRILLATION, UNSPECIFIED TYPE (HCC): ICD-10-CM

## 2018-07-02 DIAGNOSIS — K04.7 TOOTH ABSCESS: ICD-10-CM

## 2018-07-02 DIAGNOSIS — Z00.00 ROUTINE GENERAL MEDICAL EXAMINATION AT A HEALTH CARE FACILITY: ICD-10-CM

## 2018-07-02 DIAGNOSIS — H57.9 EYE DISEASE: ICD-10-CM

## 2018-07-02 DIAGNOSIS — R73.9 HYPERGLYCEMIA: ICD-10-CM

## 2018-07-02 DIAGNOSIS — G89.29 CHRONIC PAIN OF BOTH KNEES: ICD-10-CM

## 2018-07-02 DIAGNOSIS — M25.561 CHRONIC PAIN OF BOTH KNEES: ICD-10-CM

## 2018-07-02 DIAGNOSIS — I10 ESSENTIAL HYPERTENSION: ICD-10-CM

## 2018-07-02 LAB — INTERNATIONAL NORMALIZATION RATIO, POC: 1.3

## 2018-07-02 PROCEDURE — G0439 PPPS, SUBSEQ VISIT: HCPCS | Performed by: INTERNAL MEDICINE

## 2018-07-02 PROCEDURE — 99214 OFFICE O/P EST MOD 30 MIN: CPT | Performed by: INTERNAL MEDICINE

## 2018-07-02 RX ORDER — SIMVASTATIN 80 MG
TABLET ORAL
Qty: 90 TABLET | Refills: 3 | Status: SHIPPED | OUTPATIENT
Start: 2018-07-02 | End: 2019-07-19

## 2018-07-02 ASSESSMENT — ENCOUNTER SYMPTOMS
SHORTNESS OF BREATH: 0
CHEST TIGHTNESS: 0
BACK PAIN: 0
RESPIRATORY NEGATIVE: 1
WHEEZING: 0
SINUS PRESSURE: 0
ABDOMINAL PAIN: 0

## 2018-07-02 ASSESSMENT — PATIENT HEALTH QUESTIONNAIRE - PHQ9
SUM OF ALL RESPONSES TO PHQ QUESTIONS 1-9: 0
1. LITTLE INTEREST OR PLEASURE IN DOING THINGS: 0
2. FEELING DOWN, DEPRESSED OR HOPELESS: 0
SUM OF ALL RESPONSES TO PHQ9 QUESTIONS 1 & 2: 0

## 2018-07-02 NOTE — PROGRESS NOTES
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 CareTeam (Including outside providers/suppliers regularly involved in providing care):   Patient Care Team:  Rachel Garrison MD as PCP - General (Internal Medicine)  Red Dewitt MD as Surgeon (General Surgery)  Jayla Motley MD as Consulting Physician (Cardiology)    Wt Readings from Last 3 Encounters:   07/02/18 238 lb (108 kg)   06/01/18 240 lb (108.9 kg)   04/13/18 238 lb (108 kg)     Vitals:    07/02/18 1011   BP: 128/80   Weight: 238 lb (108 kg)   Height: 5' 11\" (1.803 m)           Patient's complete Health Risk Assessment and screening values have been reviewed and are found in Flowsheets. The following problems were reviewed today and where indicated follow up appointments were made and/or referrals ordered. Positive Risk Factor Screenings with Interventions:     Health Habits/Nutrition:     Body mass index is 33.19 kg/m².   Health Habits/Nutrition Interventions:  · see instructions     Personalized Preventive Plan   Current Health Maintenance Status  Immunization History   Administered Date(s) Administered    DTaP 06/28/2018    Influenza Virus Vaccine 10/23/2013    Pneumococcal 13-valent Conjugate ([id number]) 07/05/2016    Pneumococcal Polysaccharide ([id number]) 10/24/2012        Health Maintenance   Topic Date Due    AAA screen  1945    Shingles Vaccine (1 of 2 - 2 Dose Series) 07/03/2019 (Originally

## 2018-07-02 NOTE — PATIENT INSTRUCTIONS
Use over the counter cortisone 10 or whatever the highest cream once daily  Use aquaphor twice daily  See physical medicine at [facility] or wherever covered by insurance    Get a calorie counting book-avoid processed or fast foods  Try to get under 6092-7341  kcal/day AND increase exercise gradually with help of physical therapy DO NOT SKIP MEALS- preferably eat frequent small healthy snacks all day long    See ENT if the lip lesion doesn't improve       Personalized Preventive Plan for Yoselin Cisneros - 7/2/2018  Medicare offers a range of preventive health benefits. Some of the tests and screenings are paid in full while other may be subject to a deductible, co-insurance, and/or copay. Some of these benefits include a comprehensive review of your medical history including lifestyle, illnesses that may run in your family, and various assessments and screenings as appropriate. After reviewing your medical record and screening and assessments performed today your provider may have ordered immunizations, labs, imaging, and/or referrals for you. A list of these orders (if applicable) as well as your Preventive Care list are included within your After Visit Summary for your review. Other Preventive Recommendations:    · A preventive eye exam performed by an eye specialist is recommended every 1-2 years to screen for glaucoma; cataracts, macular degeneration, and other eye disorders. · A preventive dental visit is recommended every 6 months. · Try to get at least 150 minutes of exercise per week or 10,000 steps per day on a pedometer . · Order or download the FREE \"Exercise & Physical Activity: Your Everyday Guide\" from [employer]. Call 3-667.489.2215 or search [employer] online. · You need 9685-7874 mg of calcium and 9101-3424 IU of vitamin D per day.  It is possible to meet your calcium requirement with diet alone, but a vitamin D supplement is usually necessary to meet this goal.  · When exposed to the sun, use a sunscreen that protects against both UVA and UVB radiation with an SPF of 30 or greater. Reapply every 2 to 3 hours or after sweating, drying off with a towel, or swimming. · Always wear a seat belt when traveling in a car. Always wear a helmet when riding a bicycle or motorcycle.

## 2018-07-18 ENCOUNTER — TELEPHONE (OUTPATIENT)
Dept: PHARMACY | Age: 73
End: 2018-07-18

## 2018-07-18 DIAGNOSIS — K04.7 TOOTH ABSCESS: ICD-10-CM

## 2018-07-18 DIAGNOSIS — B91 POST-POLIO MUSCLE WEAKNESS: ICD-10-CM

## 2018-07-18 DIAGNOSIS — I48.91 ATRIAL FIBRILLATION, UNSPECIFIED TYPE (HCC): ICD-10-CM

## 2018-07-18 DIAGNOSIS — N20.0 KIDNEY STONES: ICD-10-CM

## 2018-07-18 DIAGNOSIS — Z12.11 COLON CANCER SCREENING: ICD-10-CM

## 2018-07-18 DIAGNOSIS — M62.81 POST-POLIO MUSCLE WEAKNESS: ICD-10-CM

## 2018-07-18 DIAGNOSIS — K59.00 CONSTIPATION, UNSPECIFIED CONSTIPATION TYPE: ICD-10-CM

## 2018-07-18 DIAGNOSIS — Z00.00 WELL ADULT EXAM: ICD-10-CM

## 2018-07-18 DIAGNOSIS — E78.5 HYPERLIPIDEMIA, UNSPECIFIED HYPERLIPIDEMIA TYPE: ICD-10-CM

## 2018-07-18 DIAGNOSIS — Z12.5 PROSTATE CANCER SCREENING: ICD-10-CM

## 2018-07-18 DIAGNOSIS — E07.9 THYROID DISEASE: ICD-10-CM

## 2018-07-18 RX ORDER — LEVOTHYROXINE SODIUM 0.12 MG/1
125 TABLET ORAL DAILY
Qty: 90 TABLET | Refills: 3 | Status: SHIPPED | OUTPATIENT
Start: 2018-07-18 | End: 2019-07-12 | Stop reason: SDUPTHER

## 2018-07-26 ENCOUNTER — ANTI-COAG VISIT (OUTPATIENT)
Dept: PHARMACY | Age: 73
End: 2018-07-26
Payer: COMMERCIAL

## 2018-07-26 DIAGNOSIS — I48.91 ATRIAL FIBRILLATION, UNSPECIFIED TYPE (HCC): ICD-10-CM

## 2018-07-26 LAB — INTERNATIONAL NORMALIZATION RATIO, POC: 1.9

## 2018-07-26 PROCEDURE — 85610 PROTHROMBIN TIME: CPT

## 2018-07-26 PROCEDURE — 99211 OFF/OP EST MAY X REQ PHY/QHP: CPT

## 2018-07-26 NOTE — PROGRESS NOTES
broccoli once per week)   Recent vomiting/diarrhea/fever, changes in weight or activity level None reported   Tobacco or alcohol use Patient reports quitting smoking 47 years ago  Patient reports having 1-2 beers or shot of whiskey per day   Upcoming surgeries or procedures Consider seeing surgeon regarding Watchman procedure per Dr. Hanna Burden     Assessment/Plan:  Patient's INR was therapeutic today (1.9) according to new lower INR goal of 1.8-2.2. Patient denies any missed warfarin doses. INR was previously stable on total weekly warfarin dose of 37.5 mg. However, sometime in late March or early April, patient experienced bleeding from an anal fistula, and self-discontinued warfarin. Despite recommendation by PCP on 4/13 to resume warfarin, patient did not. Patient finally resumed warfarin per cardiologist recommendation on 6/2 at lower dose of 2.5 mg daily. INR goal was adjusted at that time. Patient was instructed to continue warfarin 5 mg daily. Patient may require further dose titration, but I am hesitant to titrate too quickly given recent bleeding episodes. Repeat INR in 4 weeks (patient prefers extended visits due to cost burden). Patient was instructed to maintain consistent vitamin K intake and call with any bleeding, medication changes, or fever/vomiting/diarrhea. Next Clinic Appointment:  8/22    Please call Kiah at [phone] with any questions. Thanks! Curlie Holstein.  Arian Kothari, PharmD  [facility]  Ph: [phone]  7/26/2018 8:43 AM

## 2018-08-22 ENCOUNTER — ANTI-COAG VISIT (OUTPATIENT)
Dept: PHARMACY | Age: 73
End: 2018-08-22
Payer: COMMERCIAL

## 2018-08-22 DIAGNOSIS — I48.91 ATRIAL FIBRILLATION, UNSPECIFIED TYPE (HCC): ICD-10-CM

## 2018-08-22 LAB — INR BLD: 1.4

## 2018-08-22 PROCEDURE — 85610 PROTHROMBIN TIME: CPT

## 2018-08-22 PROCEDURE — 99211 OFF/OP EST MAY X REQ PHY/QHP: CPT

## 2018-08-22 NOTE — PROGRESS NOTES
ANTICOAGULATION SERVICE    Ellen Omer is a 68 y.o. male who seen in our clinic for anticoagulation monitoring and adjustment. Prescription called to pharmacy:  Pharmacy:  [facility] Phone:  [phone]   Warfarin strength:  5 mg Number of tablets:  35   Sig:  Take 5 mg daily, except 7.5 mg on Wednesdays or as directed by clinic Refills:  3   Physician:  Dr. Alfred Zamarripa      Please call [facility] at [phone] with any questions. Thanks!   Jace Spencer, PharmD  8/22/2018 3:09 PM

## 2018-09-05 ENCOUNTER — ANTI-COAG VISIT (OUTPATIENT)
Dept: PHARMACY | Age: 73
End: 2018-09-05
Payer: COMMERCIAL

## 2018-09-05 DIAGNOSIS — I48.91 ATRIAL FIBRILLATION, UNSPECIFIED TYPE (HCC): ICD-10-CM

## 2018-09-05 LAB — INTERNATIONAL NORMALIZATION RATIO, POC: 2.4

## 2018-09-05 PROCEDURE — 99211 OFF/OP EST MAY X REQ PHY/QHP: CPT

## 2018-09-05 PROCEDURE — 85610 PROTHROMBIN TIME: CPT

## 2018-09-20 RX ORDER — ATENOLOL 50 MG/1
TABLET ORAL
Qty: 90 TABLET | Refills: 3 | Status: SHIPPED | OUTPATIENT
Start: 2018-09-20 | End: 2019-09-18 | Stop reason: SDUPTHER

## 2018-10-03 ENCOUNTER — ANTI-COAG VISIT (OUTPATIENT)
Dept: PHARMACY | Age: 73
End: 2018-10-03
Payer: MEDICARE

## 2018-10-03 DIAGNOSIS — I48.91 ATRIAL FIBRILLATION, UNSPECIFIED TYPE (HCC): ICD-10-CM

## 2018-10-03 LAB — INTERNATIONAL NORMALIZATION RATIO, POC: 1.5

## 2018-10-03 PROCEDURE — 99211 OFF/OP EST MAY X REQ PHY/QHP: CPT

## 2018-10-03 PROCEDURE — 85610 PROTHROMBIN TIME: CPT

## 2018-10-16 DIAGNOSIS — K04.7 TOOTH ABSCESS: ICD-10-CM

## 2018-10-16 DIAGNOSIS — E07.9 THYROID DISEASE: ICD-10-CM

## 2018-10-16 DIAGNOSIS — N20.0 KIDNEY STONES: ICD-10-CM

## 2018-10-16 DIAGNOSIS — B91 POST-POLIO MUSCLE WEAKNESS: ICD-10-CM

## 2018-10-16 DIAGNOSIS — M62.81 POST-POLIO MUSCLE WEAKNESS: ICD-10-CM

## 2018-10-16 RX ORDER — SIMVASTATIN 80 MG
TABLET ORAL
Qty: 90 TABLET | Refills: 3 | Status: SHIPPED | OUTPATIENT
Start: 2018-10-16 | End: 2019-08-22 | Stop reason: SDUPTHER

## 2018-11-14 ENCOUNTER — ANTI-COAG VISIT (OUTPATIENT)
Dept: PHARMACY | Age: 73
End: 2018-11-14
Payer: COMMERCIAL

## 2018-11-14 DIAGNOSIS — R73.9 HYPERGLYCEMIA: ICD-10-CM

## 2018-11-14 DIAGNOSIS — I48.91 ATRIAL FIBRILLATION, UNSPECIFIED TYPE (HCC): ICD-10-CM

## 2018-11-14 LAB
GLUCOSE BLD-MCNC: 108 MG/DL (ref 70–99)
INTERNATIONAL NORMALIZATION RATIO, POC: 2.4

## 2018-11-14 PROCEDURE — 99211 OFF/OP EST MAY X REQ PHY/QHP: CPT

## 2018-11-14 PROCEDURE — 85610 PROTHROMBIN TIME: CPT

## 2018-11-14 NOTE — PROGRESS NOTES
been taking warfarin for ~10-15 years for a-fib and reports compliance   Signs/symptoms of bleeding H/o anal fistula which bleeds off and on   Vitamin K intake -Normally has ~1-2 serving of green, leafy vegetables per week (usually broccoli once per week)  -Drinks 1 glass of V8 juice occassionally   Recent vomiting/diarrhea/fever, changes in weight or activity level None reported   Tobacco or alcohol use Patient reports quitting smoking 47 years ago  Patient reports having 1-2 beers or shot of whiskey per day   Upcoming surgeries or procedures -Patient may have tooth extraction soon  -Consider seeing surgeon regarding Watchman procedure per Dr. Cobos Confer     Assessment/Plan:  Patient's INR was slightly supratherapeutic today (2.4) according to new lower INR goal of 1.8-2.2. Patient denies missed warfarin doses, diet changes, and medication changes today. INR was previously stable on total weekly warfarin dose of 37.5 mg. However, sometime in late March or early April, patient experienced bleeding from an anal fistula, and self-discontinued warfarin. Patient resumed warfarin per cardiologist recommendation on 6/2 at lower dose of 2.5 mg daily. INR goal was adjusted at that time. Today, patient was instructed to continue previously stable warfarin dose of 7.5 mg on Wednesdays, and 5 mg all other days for now. Repeat INR in 1 month (patient prefers extended visits due to cost burden). Patient was instructed to maintain consistent vitamin K intake and call with any bleeding, medication changes, or fever/vomiting/diarrhea. Patient understands dosing directions and information discussed. Dosing schedule and follow up appointment given to patient. Progress note routed to referring physician's office. Patient acknowledges working in consult agreement with pharmacist as referred by his/her physician. Next Clinic Appointment:  12/12    Please call Kiah at [phone] with any questions.

## 2018-11-15 DIAGNOSIS — R73.9 HYPERGLYCEMIA: ICD-10-CM

## 2018-11-15 DIAGNOSIS — I10 ESSENTIAL HYPERTENSION: ICD-10-CM

## 2018-11-15 DIAGNOSIS — I48.91 ATRIAL FIBRILLATION, UNSPECIFIED TYPE (HCC): ICD-10-CM

## 2018-11-15 DIAGNOSIS — E07.9 THYROID DISEASE: ICD-10-CM

## 2018-11-15 DIAGNOSIS — E78.00 PURE HYPERCHOLESTEROLEMIA: Primary | ICD-10-CM

## 2018-11-15 LAB
ESTIMATED AVERAGE GLUCOSE: 125.5 MG/DL
HBA1C MFR BLD: 6 %

## 2018-12-05 ENCOUNTER — OFFICE VISIT (OUTPATIENT)
Dept: CARDIOLOGY CLINIC | Age: 73
End: 2018-12-05
Payer: COMMERCIAL

## 2018-12-05 VITALS
BODY MASS INDEX: 34.06 KG/M2 | WEIGHT: 244.2 LBS | DIASTOLIC BLOOD PRESSURE: 70 MMHG | SYSTOLIC BLOOD PRESSURE: 128 MMHG | HEART RATE: 80 BPM

## 2018-12-05 DIAGNOSIS — I10 ESSENTIAL HYPERTENSION: ICD-10-CM

## 2018-12-05 DIAGNOSIS — I48.20 CHRONIC ATRIAL FIBRILLATION (HCC): Primary | ICD-10-CM

## 2018-12-05 DIAGNOSIS — E78.2 MIXED HYPERLIPIDEMIA: ICD-10-CM

## 2018-12-05 PROCEDURE — 99214 OFFICE O/P EST MOD 30 MIN: CPT | Performed by: INTERNAL MEDICINE

## 2018-12-05 NOTE — PROGRESS NOTES
73/1/56 14:08 AM    Click here for a link to the UpToDate guideline \"Atrial Fibrillation: Anticoagulation therapy to prevent embolization    Disclaimer: Risk Score calculation is dependent on accuracy of patient problem list and past encounter diagnosis. Diagnosis Orders   1. Chronic atrial fibrillation ([address]     2. Mixed hyperlipidemia     3. 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 Has AF. ECG with late transition. HTN:  Controlled. Takes synthroid. SOB:  Seems controlled  Hypercholesterolemia:    LDL 93 in 6/17 and takes simvastatin 80 mg. Snores occasionally but doesn't exhibit all KATARINA. Doesn't want watchman. Goal INR can be 1.8 -2.2.

## 2018-12-07 ENCOUNTER — TELEPHONE (OUTPATIENT)
Dept: CARDIOLOGY CLINIC | Age: 73
End: 2018-12-07

## 2018-12-10 ENCOUNTER — TELEPHONE (OUTPATIENT)
Dept: PHARMACY | Age: 73
End: 2018-12-10

## 2018-12-10 NOTE — TELEPHONE ENCOUNTER
Patient called to reschedule INR check as he just had dental surgery and re-started warfarin on 12/5. Patient was off warfarin for two weeks.  Rescheduled for 12/19 @ 0900    Namrata Barrientos PharmD  [id number]  12/10/2018 10:05 AM

## 2018-12-19 ENCOUNTER — ANTI-COAG VISIT (OUTPATIENT)
Dept: PHARMACY | Age: 73
End: 2018-12-19
Payer: COMMERCIAL

## 2018-12-19 DIAGNOSIS — I48.20 CHRONIC ATRIAL FIBRILLATION (HCC): ICD-10-CM

## 2018-12-19 LAB — INTERNATIONAL NORMALIZATION RATIO, POC: 1.6

## 2018-12-19 PROCEDURE — 99211 OFF/OP EST MAY X REQ PHY/QHP: CPT

## 2018-12-19 PROCEDURE — 85610 PROTHROMBIN TIME: CPT

## 2019-01-23 ENCOUNTER — TELEPHONE (OUTPATIENT)
Dept: PHARMACY | Age: 74
End: 2019-01-23

## 2019-01-25 ENCOUNTER — ANTI-COAG VISIT (OUTPATIENT)
Dept: PHARMACY | Age: 74
End: 2019-01-25
Payer: COMMERCIAL

## 2019-01-25 DIAGNOSIS — I48.20 CHRONIC ATRIAL FIBRILLATION (HCC): ICD-10-CM

## 2019-01-25 LAB — INTERNATIONAL NORMALIZATION RATIO, POC: 2.7

## 2019-01-25 PROCEDURE — 99211 OFF/OP EST MAY X REQ PHY/QHP: CPT

## 2019-01-25 PROCEDURE — 85610 PROTHROMBIN TIME: CPT

## 2019-02-27 ENCOUNTER — ANTI-COAG VISIT (OUTPATIENT)
Dept: PHARMACY | Age: 74
End: 2019-02-27
Payer: COMMERCIAL

## 2019-02-27 DIAGNOSIS — I48.20 CHRONIC ATRIAL FIBRILLATION (HCC): ICD-10-CM

## 2019-02-27 LAB — INTERNATIONAL NORMALIZATION RATIO, POC: 2.4

## 2019-02-27 PROCEDURE — 99211 OFF/OP EST MAY X REQ PHY/QHP: CPT

## 2019-02-27 PROCEDURE — 85610 PROTHROMBIN TIME: CPT

## 2019-04-03 ENCOUNTER — ANTI-COAG VISIT (OUTPATIENT)
Dept: PHARMACY | Age: 74
End: 2019-04-03
Payer: COMMERCIAL

## 2019-04-03 DIAGNOSIS — I48.20 CHRONIC ATRIAL FIBRILLATION (HCC): ICD-10-CM

## 2019-04-03 LAB — INTERNATIONAL NORMALIZATION RATIO, POC: 1.7

## 2019-04-03 PROCEDURE — 99211 OFF/OP EST MAY X REQ PHY/QHP: CPT

## 2019-04-03 PROCEDURE — 85610 PROTHROMBIN TIME: CPT

## 2019-04-03 NOTE — PROGRESS NOTES
ANTICOAGULATION SERVICE    Jose J Anderson is a 68 y.o. male with PMHx significant for A-fib, HTN, HLD who presents to clinic 4/3/2019 for anticoagulation monitoring and adjustment.     Anticoagulation Indication(s):  Afib    Referring Physician:  Dr. Sugey Soliz  Goal INR Range:  1.8-2.2   Duration of Anticoagulation Therapy:  Indefinite  Time of day dose taken:  PM  Product patient has at home:  warfarin 5 mg (peach)    INR Summary                            Warfarin regimen (mg)  Date INR   A/P    Sun Mon Tue Wed Thu Fri Sat Mg/wk  4/3 1.7 Below goal, continue  5 5 5 7.5 5 5 5 37.5  2/27 2.4 Above goal, continue  5 5 5 7.5 5 5 5 37.5   1/25 2.7 Above goal, continue  5 5 5 7.5 5 5 5 37.5   12/19 1.6 Below goal (off warfarin) 5 5 5 7.5 5 5 5 37.5  11/14 2.4 Above goal, continue  5 5 5 7.5 5 5 5 37.5  10/3 1.5 Below goal, continue  5 5 5 7.5 5 5 5 37.5  9/5 2.4 Above goal, continue  5 5 5 7.5 5 5 5 37.5  8/22 1.4 Below goal, increase  5 5 5 7.5 5 5 5 37.5  7/26 1.9 At goal, no change  5 5 5 5 5 5 5 35  7/2 1.3 Below goal, increase  5 5 5 5 5 5 5 35  6/20 1.2 Below goal, increase  2.5 5 2.5 5 2.5 5 5 27.5   6/7 1.2 Below goal, increase  2.5 5 2.5 2.5 5 2.5 2.5 22.5  6/2 --- Resume warfarin  2.5 2.5 2.5 2.5 2.5 2.5 2.5 17.5  Mar-May  Patient self-d/c'd warfarin  2/28 2.5 At goal, no change  5 7.5 5 5 5 5 5 37.5  1/17 2.1 At goal, no change  5 7.5 5 5 5 5 5 37.5  12/13 2.4 At goal, no change  5 7.5 5 5 5 5 5 37.5  11/15 2.2 At goal, no change  5 7.5 5 5 5 5 5 37.5  10/18 2.8 At goal, no change  5 7.5 5 5 5 5 5 37.5  9/20 3.1 Above goal, decrease  5 7.5 5 5 5 5 5 37.5  8/30 3.1 Above goal, decrease  5 7.5 5 5 5 7.5 5 40  7/31 2.9 At goal, no change  5 7.5 5 7.5 5 7.5 5 42.5  7/3 2.7 At goal, no change  5 7.5 5 7.5 5 7.5 5 42.5  5/25 2.5 At goal, no change  5 7.5 5 7.5 5 7.5 5 42.5    Patient History:  Recent hospitalizations/HC visits None since last visit   Recent medication changes None reported today   Medications taken regularly that may interact with warfarin or alter INR ASA 81 mg, levothyroxine   Warfarin dose taken as prescribed -No, patient states instead of taking warfarin 7.5 mg every Wednesday, he has only been taking it every other Wednesday  -Patient was off warfarin x 2 weeks for oral surgery in late November; patient likely held warfarin longer than instructed as he often does not follow instructions from healthcare providers  -Does not use pillbox, but has been taking warfarin for ~10-15 years for a-fib and reports compliance   Signs/symptoms of bleeding H/o anal fistula which bleeds off and on  None reported today   Vitamin K intake -Normally has ~1-2 serving of green, leafy vegetables per week (usually broccoli once per week), has iceburg salad 3-4x per week  -Drinks 1 glass of V8 juice occassionally   Recent vomiting/diarrhea/fever, changes in weight or activity level None reported   Tobacco or alcohol use Patient reports quitting smoking 47 years ago  Patient reports having 1-2 beers or shot of whiskey per day   Upcoming surgeries or procedures Patient declined to consider Watchman procedure     Assessment/Plan:  Patient's INR was slightly subtherapeutic today (1.7) according to new lower INR goal of 1.8-2.2. Since INR goal range is so narrow, and patient refuses to come to clinic more than once monthly, will consider INR of 1.5-2.5 acceptable. Patient also reports noncompliance with warfarin dosing instructions since last visit (see above), so it's difficult to determine if dose should be adjusted. If we consider INR 1.5-2.5 acceptable, then patient has only had one level out of range since September. Patient was instructed to continue warfarin 7.5 mg on Wednesdays, and 5 mg all other days for now. Repeat INR in 5 weeks (patient refused to return any sooner due to difficulty walking from his car to the clinic).   Provided patient with information on home INR monitoring as he reports significant

## 2019-05-08 ENCOUNTER — ANTI-COAG VISIT (OUTPATIENT)
Dept: PHARMACY | Age: 74
End: 2019-05-08
Payer: COMMERCIAL

## 2019-05-08 DIAGNOSIS — I48.20 CHRONIC ATRIAL FIBRILLATION (HCC): ICD-10-CM

## 2019-05-08 LAB — INTERNATIONAL NORMALIZATION RATIO, POC: 2

## 2019-05-08 PROCEDURE — 85610 PROTHROMBIN TIME: CPT

## 2019-05-08 PROCEDURE — 99211 OFF/OP EST MAY X REQ PHY/QHP: CPT

## 2019-05-08 NOTE — PROGRESS NOTES
ANTICOAGULATION SERVICE    Yessy Smith is a 68 y.o. male with PMHx significant for A-fib, HTN, HLD who presents to clinic 5/8/2019 for anticoagulation monitoring and adjustment.     Anticoagulation Indication(s):  Afib    Referring Physician:  Dr. Fatemeh Hernandez  Goal INR Range:  1.8-2.2   Duration of Anticoagulation Therapy:  Indefinite  Time of day dose taken:  PM  Product patient has at home:  warfarin 5 mg (peach)    INR Summary                            Warfarin regimen (mg)  Date INR   A/P    Sun Mon Tue Wed Thu Fri Sat Mg/wk  5/8 2.0 At goal, no change  5 5 5 7.5 5 5 5 37.5  4/3 1.7 Below goal, continue  5 5 5 7.5 5 5 5 37.5  2/27 2.4 Above goal, continue  5 5 5 7.5 5 5 5 37.5   1/25 2.7 Above goal, continue  5 5 5 7.5 5 5 5 37.5   12/19 1.6 Below goal (off warfarin) 5 5 5 7.5 5 5 5 37.5  11/14 2.4 Above goal, continue  5 5 5 7.5 5 5 5 37.5  10/3 1.5 Below goal, continue  5 5 5 7.5 5 5 5 37.5  9/5 2.4 Above goal, continue  5 5 5 7.5 5 5 5 37.5  8/22 1.4 Below goal, increase  5 5 5 7.5 5 5 5 37.5  7/26 1.9 At goal, no change  5 5 5 5 5 5 5 35  7/2 1.3 Below goal, increase  5 5 5 5 5 5 5 35  6/20 1.2 Below goal, increase  2.5 5 2.5 5 2.5 5 5 27.5   6/7 1.2 Below goal, increase  2.5 5 2.5 2.5 5 2.5 2.5 22.5  6/2 --- Resume warfarin  2.5 2.5 2.5 2.5 2.5 2.5 2.5 17.5  Mar-May  Patient self-d/c'd warfarin  2/28 2.5 At goal, no change  5 7.5 5 5 5 5 5 37.5  1/17 2.1 At goal, no change  5 7.5 5 5 5 5 5 37.5  12/13 2.4 At goal, no change  5 7.5 5 5 5 5 5 37.5  11/15 2.2 At goal, no change  5 7.5 5 5 5 5 5 37.5  10/18 2.8 At goal, no change  5 7.5 5 5 5 5 5 37.5  9/20 3.1 Above goal, decrease  5 7.5 5 5 5 5 5 37.5  8/30 3.1 Above goal, decrease  5 7.5 5 5 5 7.5 5 40  7/31 2.9 At goal, no change  5 7.5 5 7.5 5 7.5 5 42.5  7/3 2.7 At goal, no change  5 7.5 5 7.5 5 7.5 5 42.5  5/25 2.5 At goal, no change  5 7.5 5 7.5 5 7.5 5 42.5    Patient History:  Recent hospitalizations/HC visits None since last visit   Recent medication changes None reported today   Medications taken regularly that may interact with warfarin or alter INR ASA 81 mg, levothyroxine   Warfarin dose taken as prescribed -Yes, reports compliance  -Patient was off warfarin x 2 weeks for oral surgery in late November; patient likely held warfarin longer than instructed as he often does not follow instructions from healthcare providers  -Does not use pillbox, but has been taking warfarin for ~10-15 years for a-fib and reports compliance   Signs/symptoms of bleeding None reported today  H/o anal fistula which bleeds off and on   Vitamin K intake -Normally has ~1-2 serving of green, leafy vegetables per week (usually broccoli once per week), has iceburg salad 3-4x per week  -Drinks 1 glass of V8 juice occassionally   Recent vomiting/diarrhea/fever, changes in weight or activity level None reported   Tobacco or alcohol use Patient reports quitting smoking 47 years ago  Patient reports having 1-2 beers or shot of whiskey per day   Upcoming surgeries or procedures Patient declined to consider Watchman procedure     Assessment/Plan:  Patient's INR was therapeutic today (2.0) according to new lower INR goal of 1.8-2.2. Patient reports compliance with warfarin dosing instructions since last visit. Since INR goal range is narrow, and patient refuses to come to clinic more than once monthly, will consider INR of 1.5-2.5 acceptable. If we consider INR 1.5-2.5 acceptable, then patient has only had one level out of range since September. Patient was instructed to continue warfarin 7.5 mg on Wednesdays, and 5 mg all other days for now. Repeat INR in 6 weeks (patient refuses to return any sooner due to difficulty walking from his car to the clinic). Provided patient with information on home INR monitoring as he reports significant difficulty walking over the past month.   Patient was instructed to maintain consistent vitamin K intake and call with any bleeding, medication changes, or fever/vomiting/diarrhea. Prescription called to pharmacy:  Pharmacy:  [facility]) Rx Phone:  [phone]   Warfarin strength:  5 mg Number of tablets:  90-day supply   Sig:  Take 7.5 mg on Wed, and 5 mg on all other days or as directed by clinic Refills:  1   Physician:  Dr. Kemal Fernandez      Patient understands dosing directions and information discussed. Dosing schedule and follow up appointment given to patient. Progress note routed to referring physician's office. Patient acknowledges working in consult agreement with pharmacist as referred by his/her physician. Next Clinic Appointment:  6/19    Please call Kiah at [phone] with any questions. Thanks! Jeri Hurley.  Nela Pearl, PharmD  [facility]  Ph: [phone]  5/8/2019 9:08 AM

## 2019-05-22 ENCOUNTER — TELEPHONE (OUTPATIENT)
Dept: INTERNAL MEDICINE CLINIC | Age: 74
End: 2019-05-22

## 2019-05-22 DIAGNOSIS — Z00.00 ROUTINE GENERAL MEDICAL EXAMINATION AT A HEALTH CARE FACILITY: Primary | ICD-10-CM

## 2019-05-22 NOTE — TELEPHONE ENCOUNTER
Left message on machine  LABS ARE NEEDED ORDERED IN Epic  ASK PATIENT TO 133Arely Mazariegos WITH PHONE PERSON  TO ELIMINATE PHONE TAG !!

## 2019-06-12 ENCOUNTER — OFFICE VISIT (OUTPATIENT)
Dept: CARDIOLOGY CLINIC | Age: 74
End: 2019-06-12
Payer: COMMERCIAL

## 2019-06-12 ENCOUNTER — TELEPHONE (OUTPATIENT)
Dept: CARDIOLOGY CLINIC | Age: 74
End: 2019-06-12

## 2019-06-12 VITALS
SYSTOLIC BLOOD PRESSURE: 110 MMHG | BODY MASS INDEX: 33.95 KG/M2 | WEIGHT: 243.4 LBS | HEART RATE: 81 BPM | DIASTOLIC BLOOD PRESSURE: 70 MMHG

## 2019-06-12 DIAGNOSIS — I48.20 CHRONIC ATRIAL FIBRILLATION (HCC): Primary | ICD-10-CM

## 2019-06-12 PROCEDURE — 99213 OFFICE O/P EST LOW 20 MIN: CPT | Performed by: INTERNAL MEDICINE

## 2019-06-12 PROCEDURE — 93000 ELECTROCARDIOGRAM COMPLETE: CPT | Performed by: INTERNAL MEDICINE

## 2019-06-12 NOTE — PROGRESS NOTES
Not on file   Other Topics Concern    Not on file   Social History Narrative    Not on file        Patient has a family history is not on file. Patient  has a past medical history of Atrial fibrillation ([address] Epididymitis, Grave's disease, Hypertension, Irregular heart rate, and Polio. Current Outpatient Medications   Medication Sig Dispense Refill    simvastatin (ZOCOR) 80 MG tablet TAKE ONE TABLET BY MOUTH IN THE EVENING 90 tablet 3    atenolol (TENORMIN) 50 MG tablet TAKE ONE TABLET BY MOUTH ONCE DAILY 90 tablet 3    simvastatin (ZOCOR) 80 MG tablet TAKE ONE TABLET BY MOUTH ONCE DAILY NIGHTLY 90 tablet 3    warfarin (COUMADIN) 5 MG tablet Take 1 tablet by mouth daily 135 tablet 2    warfarin (COUMADIN) 2 MG tablet Take 1 tablet by mouth daily 90 tablet 5    atenolol (TENORMIN) 50 MG tablet TAKE ONE TABLET BY MOUTH ONCE DAILY 90 tablet 3    vitamin D (CHOLECALCIFEROL) 1000 UNIT TABS tablet Take 2 tablets by mouth daily 60 tablet     aspirin EC 81 MG EC tablet Take 1 tablet by mouth daily 30 tablet 3    levothyroxine (SYNTHROID) 125 MCG tablet Take 1 tablet by mouth daily 90 tablet 3     No current facility-administered medications for this visit. Vitals  Weight: 243 lb 6.4 oz (110.4 kg)  Blood Pressure: BP: (110)/(70)    Pulse: 81            Diagnosis Orders   1. Chronic atrial fibrillation (HCC)  EKG 12 lead              Review of Systems   Constitutional: Negative. HENT: Negative. Eyes: Negative. Cardiovascular: Negative. Gastrointestinal: Negative. Endocrine: Negative. Genitourinary: Negative. Musculoskeletal: Negative. Skin: Negative. Allergic/Immunologic: Negative. Neurological: Negative. Hematological: Negative. Psychiatric/Behavioral: Negative. Objective:   Physical Exam   Constitutional: He is oriented to person, place, and time. He appears well-developed and well-nourished. HENT:   Head: Normocephalic and atraumatic.    Eyes: EOM are normal. Pupils are equal, round, and reactive to light. Neck: Normal range of motion. Neck supple. Cardiovascular: Normal rate and regular rhythm. Exam reveals no friction rub. No murmur heard. Pulmonary/Chest: Effort normal and breath sounds normal.   Abdominal: Soft. Bowel sounds are normal.   Musculoskeletal: Normal range of motion. He exhibits no edema or deformity. Neurological: He is alert and oriented to person, place, and time. Skin: Skin is warm and dry. Psychiatric: He has a normal mood and affect. His behavior is normal. Thought content normal.       Assessment:      [id number] Score for Atrial Fibrillation Stroke Risk   Risk   Factors  Component Value   C CHF No 0   H HTN Yes 1   A2 Age >= 76 No,  (71 y.o.) 0   D DM No 0   S2 Prior Stroke/TIA No 0   V Vascular Disease No 0   A Age 74-69 Yes,  (71 y.o.) 1   Sc Sex male 0    [id number]  Score  2   Score last updated 69/2/08 04:90 AM    Click here for a link to the UpToDate guideline \"Atrial Fibrillation: Anticoagulation therapy to prevent embolization    Disclaimer: Risk Score calculation is dependent on accuracy of patient problem list and past encounter diagnosis. Diagnosis Orders   1. Chronic atrial fibrillation (HCC)  EKG 12 lead             Plan:      History of graves disease and had orbital decompression with resultant double vision. Had full polio. Has AF. ECG with late transition. HTN:  Controlled. Takes synthroid. SOB:  Seems controlled  Hypercholesterolemia:    controllled with simva. Snores occasionally but doesn't exhibit all KATARINA. Check echo. Doesn't want watchman. Goal INR can be 1.8 -2.2.

## 2019-06-19 ENCOUNTER — ANTI-COAG VISIT (OUTPATIENT)
Dept: PHARMACY | Age: 74
End: 2019-06-19
Payer: COMMERCIAL

## 2019-06-19 DIAGNOSIS — I48.20 CHRONIC ATRIAL FIBRILLATION (HCC): ICD-10-CM

## 2019-06-19 DIAGNOSIS — Z00.00 ROUTINE GENERAL MEDICAL EXAMINATION AT A HEALTH CARE FACILITY: ICD-10-CM

## 2019-06-19 LAB
A/G RATIO: 1.3 (ref 1.1–2.2)
ALBUMIN SERPL-MCNC: 4 G/DL (ref 3.4–5)
ALP BLD-CCNC: 80 U/L (ref 40–129)
ALT SERPL-CCNC: 20 U/L (ref 10–40)
ANION GAP SERPL CALCULATED.3IONS-SCNC: 12 MMOL/L (ref 3–16)
AST SERPL-CCNC: 22 U/L (ref 15–37)
BASOPHILS ABSOLUTE: 0.1 K/UL (ref 0–0.2)
BASOPHILS RELATIVE PERCENT: 1.1 %
BILIRUB SERPL-MCNC: 0.6 MG/DL (ref 0–1)
BUN BLDV-MCNC: 15 MG/DL (ref 7–20)
CALCIUM SERPL-MCNC: 9.7 MG/DL (ref 8.3–10.6)
CHLORIDE BLD-SCNC: 104 MMOL/L (ref 99–110)
CHOLESTEROL, TOTAL: 141 MG/DL (ref 0–199)
CO2: 29 MMOL/L (ref 21–32)
CREAT SERPL-MCNC: 0.6 MG/DL (ref 0.8–1.3)
CREATININE URINE: 171.4 MG/DL (ref 39–259)
EOSINOPHILS ABSOLUTE: 0.2 K/UL (ref 0–0.6)
EOSINOPHILS RELATIVE PERCENT: 2.3 %
GFR AFRICAN AMERICAN: >60
GFR NON-AFRICAN AMERICAN: >60
GLOBULIN: 3 G/DL
GLUCOSE BLD-MCNC: 103 MG/DL (ref 70–99)
HCT VFR BLD CALC: 47.6 % (ref 40.5–52.5)
HDLC SERPL-MCNC: 41 MG/DL (ref 40–60)
HEMOGLOBIN: 15.9 G/DL (ref 13.5–17.5)
INR BLD: 1.9
LDL CHOLESTEROL CALCULATED: 75 MG/DL
LYMPHOCYTES ABSOLUTE: 1.8 K/UL (ref 1–5.1)
LYMPHOCYTES RELATIVE PERCENT: 21.7 %
MCH RBC QN AUTO: 31 PG (ref 26–34)
MCHC RBC AUTO-ENTMCNC: 33.4 G/DL (ref 31–36)
MCV RBC AUTO: 92.7 FL (ref 80–100)
MICROALBUMIN UR-MCNC: 2.4 MG/DL
MICROALBUMIN/CREAT UR-RTO: 14 MG/G (ref 0–30)
MONOCYTES ABSOLUTE: 0.8 K/UL (ref 0–1.3)
MONOCYTES RELATIVE PERCENT: 9.2 %
NEUTROPHILS ABSOLUTE: 5.5 K/UL (ref 1.7–7.7)
NEUTROPHILS RELATIVE PERCENT: 65.7 %
PDW BLD-RTO: 13.3 % (ref 12.4–15.4)
PLATELET # BLD: 264 K/UL (ref 135–450)
PMV BLD AUTO: 9.6 FL (ref 5–10.5)
POTASSIUM SERPL-SCNC: 4.5 MMOL/L (ref 3.5–5.1)
RBC # BLD: 5.13 M/UL (ref 4.2–5.9)
SODIUM BLD-SCNC: 145 MMOL/L (ref 136–145)
TOTAL PROTEIN: 7 G/DL (ref 6.4–8.2)
TRIGL SERPL-MCNC: 126 MG/DL (ref 0–150)
TSH SERPL DL<=0.05 MIU/L-ACNC: 0.84 UIU/ML (ref 0.27–4.2)
VLDLC SERPL CALC-MCNC: 25 MG/DL
WBC # BLD: 8.4 K/UL (ref 4–11)

## 2019-06-19 PROCEDURE — 99211 OFF/OP EST MAY X REQ PHY/QHP: CPT

## 2019-06-19 PROCEDURE — 85610 PROTHROMBIN TIME: CPT

## 2019-06-19 NOTE — PROGRESS NOTES
hospitalizations/HC visits 6/12 Dr. Ubaldo Schmitt: echo ordered, possible DCCV soon based on results; no med changes   Recent medication changes None reported today   Medications taken regularly that may interact with warfarin or alter INR ASA 81 mg, levothyroxine   Warfarin dose taken as prescribed -Yes, reports compliance  -Patient was off warfarin x 2 weeks for oral surgery in late November; patient likely held warfarin longer than instructed as he often does not follow instructions from healthcare providers  -Does not use pillbox, but has been taking warfarin for ~10-15 years for a-fib and reports compliance   Signs/symptoms of bleeding None reported today  H/o anal fistula which bleeds off and on   Vitamin K intake -Hasn't had many greens lately, but trying for 1-2 per week  -Normally has ~1-2 serving of green, leafy vegetables per week (usually broccoli once per week), has iceburg salad 3-4x per week  -Drinks 1 glass of V8 juice occassionally   Recent vomiting/diarrhea/fever, changes in weight or activity level Same activity level as before. Patient claims to be very tired all of the time and has to stop alf between the parking lot and clinic office because he runs out of energy. This is an ongoing problem. Tobacco or alcohol use Patient reports quitting smoking 47 years ago  Patient reports having 1-2 beers or shot of whiskey per day   Upcoming surgeries or procedures Patient declined to consider Watchman procedure as of now  Patient getting Echo soon to verify that [address] [place] can be performed     Assessment/Plan:  Patient's INR was therapeutic today (1.9) according to new lower INR goal of 1.8-2.2. Patient reports compliance with warfarin dosing instructions since last visit. Since INR goal range is narrow, and patient refuses to come to clinic more than once monthly, will consider INR of 1.5-2.5 acceptable. If we consider INR 1.5-2.5 acceptable, then patient has only had one level out of range since September. Patient was instructed to continue warfarin 7.5 mg on Wednesdays, and 5 mg all other days for now. Repeat INR in 6 weeks (patient refuses to return any sooner due to difficulty walking from his car to the clinic). Discussed other options for anticoagulation (DOACs) with patient today, but he states they are likely unaffordable for him. Provided patient with information on home INR monitoring as he reports significant difficulty walking over the past few months. Patient was instructed to maintain consistent vitamin K intake and call with any bleeding, medication changes, or fever/vomiting/diarrhea. Patient understands dosing directions and information discussed. Dosing schedule and follow up appointment given to patient. Progress note routed to referring physician's office. Patient acknowledges working in consult agreement with pharmacist as referred by his/her physician. Next Clinic Appointment:  7/31    Please call Kiah at [phone] with any questions. Thanks!   Jaime Duran, PharmD Candidate [id number]  [facility]  Ph: [phone]  6/19/2019 8:47 AM

## 2019-06-20 LAB
ESTIMATED AVERAGE GLUCOSE: 134.1 MG/DL
HBA1C MFR BLD: 6.3 %

## 2019-07-12 DIAGNOSIS — Z00.00 WELL ADULT EXAM: ICD-10-CM

## 2019-07-12 DIAGNOSIS — Z12.5 PROSTATE CANCER SCREENING: ICD-10-CM

## 2019-07-12 DIAGNOSIS — E07.9 THYROID DISEASE: ICD-10-CM

## 2019-07-12 DIAGNOSIS — Z12.11 COLON CANCER SCREENING: ICD-10-CM

## 2019-07-12 DIAGNOSIS — K59.00 CONSTIPATION, UNSPECIFIED CONSTIPATION TYPE: ICD-10-CM

## 2019-07-12 DIAGNOSIS — M62.81 POST-POLIO MUSCLE WEAKNESS: ICD-10-CM

## 2019-07-12 DIAGNOSIS — I48.91 ATRIAL FIBRILLATION, UNSPECIFIED TYPE (HCC): ICD-10-CM

## 2019-07-12 DIAGNOSIS — B91 POST-POLIO MUSCLE WEAKNESS: ICD-10-CM

## 2019-07-12 DIAGNOSIS — K04.7 TOOTH ABSCESS: ICD-10-CM

## 2019-07-12 DIAGNOSIS — N20.0 KIDNEY STONES: ICD-10-CM

## 2019-07-12 DIAGNOSIS — E78.5 HYPERLIPIDEMIA, UNSPECIFIED HYPERLIPIDEMIA TYPE: ICD-10-CM

## 2019-07-12 RX ORDER — LEVOTHYROXINE SODIUM 0.12 MG/1
TABLET ORAL
Qty: 90 TABLET | Refills: 3 | Status: SHIPPED | OUTPATIENT
Start: 2019-07-12 | End: 2020-07-08

## 2019-07-19 ENCOUNTER — OFFICE VISIT (OUTPATIENT)
Dept: INTERNAL MEDICINE CLINIC | Age: 74
End: 2019-07-19
Payer: MEDICARE

## 2019-07-19 VITALS
SYSTOLIC BLOOD PRESSURE: 130 MMHG | HEIGHT: 71 IN | DIASTOLIC BLOOD PRESSURE: 80 MMHG | WEIGHT: 243 LBS | BODY MASS INDEX: 34.02 KG/M2

## 2019-07-19 DIAGNOSIS — M62.81 POST-POLIO MUSCLE WEAKNESS: ICD-10-CM

## 2019-07-19 DIAGNOSIS — I10 ESSENTIAL HYPERTENSION: ICD-10-CM

## 2019-07-19 DIAGNOSIS — E55.9 VITAMIN D DEFICIENCY: ICD-10-CM

## 2019-07-19 DIAGNOSIS — I48.20 CHRONIC ATRIAL FIBRILLATION (HCC): ICD-10-CM

## 2019-07-19 DIAGNOSIS — E78.2 MIXED HYPERLIPIDEMIA: ICD-10-CM

## 2019-07-19 DIAGNOSIS — E66.09 NON MORBID OBESITY DUE TO EXCESS CALORIES: ICD-10-CM

## 2019-07-19 DIAGNOSIS — R73.9 HYPERGLYCEMIA: ICD-10-CM

## 2019-07-19 DIAGNOSIS — E07.9 THYROID DISEASE: ICD-10-CM

## 2019-07-19 DIAGNOSIS — Z00.00 ROUTINE GENERAL MEDICAL EXAMINATION AT A HEALTH CARE FACILITY: ICD-10-CM

## 2019-07-19 DIAGNOSIS — Z00.00 WELL ADULT EXAM: Primary | ICD-10-CM

## 2019-07-19 DIAGNOSIS — B91 POST-POLIO MUSCLE WEAKNESS: ICD-10-CM

## 2019-07-19 PROCEDURE — 99214 OFFICE O/P EST MOD 30 MIN: CPT | Performed by: INTERNAL MEDICINE

## 2019-07-19 PROCEDURE — G0439 PPPS, SUBSEQ VISIT: HCPCS | Performed by: INTERNAL MEDICINE

## 2019-07-19 SDOH — HEALTH STABILITY: MENTAL HEALTH: HOW MANY STANDARD DRINKS CONTAINING ALCOHOL DO YOU HAVE ON A TYPICAL DAY?: 1 OR 2

## 2019-07-19 SDOH — HEALTH STABILITY: MENTAL HEALTH: HOW OFTEN DO YOU HAVE A DRINK CONTAINING ALCOHOL?: 2-4 TIMES A MONTH

## 2019-07-19 ASSESSMENT — PATIENT HEALTH QUESTIONNAIRE - PHQ9
SUM OF ALL RESPONSES TO PHQ QUESTIONS 1-9: 2
SUM OF ALL RESPONSES TO PHQ QUESTIONS 1-9: 2

## 2019-07-19 ASSESSMENT — ENCOUNTER SYMPTOMS
SHORTNESS OF BREATH: 0
CHEST TIGHTNESS: 0
ABDOMINAL PAIN: 0
RESPIRATORY NEGATIVE: 1

## 2019-07-19 NOTE — PROGRESS NOTES
person, place, and time. He has normal reflexes. No cranial nerve deficit. He exhibits abnormal muscle tone. Coordination abnormal.   Skin: Skin is warm and dry. Psychiatric: He has a normal mood and affect. His behavior is normal. Judgment and thought content normal.         Assessment and Plan:      Post-polio muscle weakness  See Michael Rivero for possible help   We will call to see if she is the best one to see him or if she suggests someone else     Hypertension  Controlled w current regimen- continue and monitor closely      Thyroid disease  Controlled w current regimen- continue and monitor closely      Atrial fibrillation ([address]  Controlled but to have possible cardioversion    Hyperglycemia  Cont to work on cutting carbs and follow-glyco up this time-recheck 3-4 mos     Hyperlipidemia  Cont meds and follow     Non morbid obesity due to excess calories  Discussed with patient at length health risks of obesity and need for diet and exercise      Vitamin D deficiency  Controlled w current regimen- continue and monitor closely         Encounter Diagnoses   Name Primary?     Well adult exam Yes    Post-polio muscle weakness     Essential hypertension     Thyroid disease     Chronic atrial fibrillation (HCC)     Hyperglycemia     Mixed hyperlipidemia     Non morbid obesity due to excess calories     Vitamin D deficiency        Orders Placed This Encounter   Procedures   Staci Jones MD, Physical Medicine and Rehabilitation, [facility]     Referral Priority:   Routine     Referral Type:   Eval and Treat     Referral Reason:   Specialty Services Required     Referred to Provider:   Shireen Lal MD     Requested Specialty:   Pain Management     Number of Visits Requested:   1

## 2019-07-31 ENCOUNTER — ANTI-COAG VISIT (OUTPATIENT)
Dept: PHARMACY | Age: 74
End: 2019-07-31
Payer: MEDICARE

## 2019-07-31 DIAGNOSIS — I48.20 CHRONIC ATRIAL FIBRILLATION (HCC): Primary | ICD-10-CM

## 2019-07-31 DIAGNOSIS — I48.20 CHRONIC ATRIAL FIBRILLATION (HCC): ICD-10-CM

## 2019-07-31 LAB — INTERNATIONAL NORMALIZATION RATIO, POC: 2.2

## 2019-07-31 PROCEDURE — 85610 PROTHROMBIN TIME: CPT

## 2019-07-31 PROCEDURE — 99211 OFF/OP EST MAY X REQ PHY/QHP: CPT

## 2019-07-31 NOTE — PROGRESS NOTES
cardioversion is scheduled. Otherwise, plan to repeat INR level in ~6weeks. Shortly discussed about home INR monitoring devices due to patient's difficulty in ambulating. Meanwhile, patient was instructed to maintain consistent vitamin K intake and call with any bleeding, medication changes, or fever/vomiting/diarrhea. Off note, patient previously refused DOACs due to high cost.      Patient understands dosing directions and information discussed. Dosing schedule and follow up appointment given to patient. Progress note routed to referring physician's office. Patient acknowledges working in consult agreement with pharmacist as referred by his/her physician. Next Clinic Appointment:  TBD    Please call Kiah at [phone] with any questions. Thanks!   Brenton Denney, PharmD  PGY1 Pharmacy resident  [facility]  Ph: [phone]  7/31/2019 8:58 AM

## 2019-08-19 ENCOUNTER — HOSPITAL ENCOUNTER (OUTPATIENT)
Dept: NON INVASIVE DIAGNOSTICS | Age: 74
Discharge: HOME | End: 2019-08-19
Payer: MEDICARE

## 2019-08-19 DIAGNOSIS — I48.20 CHRONIC ATRIAL FIBRILLATION (HCC): ICD-10-CM

## 2019-08-19 LAB
LV EF: 58 %
LVEF MODALITY: NORMAL

## 2019-08-19 PROCEDURE — 93306 TTE W/DOPPLER COMPLETE: CPT

## 2019-08-22 ENCOUNTER — TELEPHONE (OUTPATIENT)
Dept: CARDIOLOGY CLINIC | Age: 74
End: 2019-08-22

## 2019-08-22 DIAGNOSIS — E07.9 THYROID DISEASE: ICD-10-CM

## 2019-08-22 DIAGNOSIS — M62.81 POST-POLIO MUSCLE WEAKNESS: ICD-10-CM

## 2019-08-22 DIAGNOSIS — B91 POST-POLIO MUSCLE WEAKNESS: ICD-10-CM

## 2019-08-22 DIAGNOSIS — N20.0 KIDNEY STONES: ICD-10-CM

## 2019-08-22 DIAGNOSIS — K04.7 TOOTH ABSCESS: ICD-10-CM

## 2019-08-22 RX ORDER — SIMVASTATIN 80 MG
TABLET ORAL
Qty: 90 TABLET | Refills: 3 | Status: SHIPPED | OUTPATIENT
Start: 2019-08-22 | End: 2020-10-07

## 2019-08-22 NOTE — TELEPHONE ENCOUNTER
The patient called states that he was returning Chai Menendez (nurse).  Please call the patient back at [phone]

## 2019-08-28 ENCOUNTER — TELEPHONE (OUTPATIENT)
Dept: CARDIOLOGY CLINIC | Age: 74
End: 2019-08-28

## 2019-08-28 DIAGNOSIS — I48.20 CHRONIC ATRIAL FIBRILLATION (HCC): Primary | ICD-10-CM

## 2019-09-18 RX ORDER — ATENOLOL 50 MG/1
TABLET ORAL
Qty: 90 TABLET | Refills: 3 | Status: SHIPPED | OUTPATIENT
Start: 2019-09-18 | End: 2020-09-18 | Stop reason: SDUPTHER

## 2019-10-01 ENCOUNTER — TELEPHONE (OUTPATIENT)
Dept: PHARMACY | Age: 74
End: 2019-10-01

## 2019-10-09 ENCOUNTER — ANTI-COAG VISIT (OUTPATIENT)
Dept: PHARMACY | Age: 74
End: 2019-10-09
Payer: MEDICARE

## 2019-10-09 LAB — INTERNATIONAL NORMALIZATION RATIO, POC: 2.7

## 2019-10-09 PROCEDURE — 99211 OFF/OP EST MAY X REQ PHY/QHP: CPT

## 2019-10-09 PROCEDURE — 85610 PROTHROMBIN TIME: CPT

## 2019-10-11 ENCOUNTER — TELEPHONE (OUTPATIENT)
Dept: INTERNAL MEDICINE CLINIC | Age: 74
End: 2019-10-11

## 2019-10-11 DIAGNOSIS — L85.3 DRY SKIN: Primary | ICD-10-CM

## 2019-11-19 ENCOUNTER — TELEPHONE (OUTPATIENT)
Dept: PHARMACY | Age: 74
End: 2019-11-19

## 2019-11-19 ENCOUNTER — PROCEDURE VISIT (OUTPATIENT)
Dept: SURGERY | Age: 74
End: 2019-11-19
Payer: MEDICARE

## 2019-11-19 VITALS
OXYGEN SATURATION: 99 % | BODY MASS INDEX: 32.08 KG/M2 | SYSTOLIC BLOOD PRESSURE: 135 MMHG | HEART RATE: 98 BPM | WEIGHT: 230 LBS | DIASTOLIC BLOOD PRESSURE: 75 MMHG | TEMPERATURE: 97.7 F

## 2019-11-19 DIAGNOSIS — C44.319 BASAL CELL CARCINOMA OF FOREHEAD: Primary | ICD-10-CM

## 2019-11-19 PROCEDURE — 13132 CMPLX RPR F/C/C/M/N/AX/G/H/F: CPT | Performed by: DERMATOLOGY

## 2019-11-19 PROCEDURE — 17312 MOHS ADDL STAGE: CPT | Performed by: DERMATOLOGY

## 2019-11-19 PROCEDURE — 17311 MOHS 1 STAGE H/N/HF/G: CPT | Performed by: DERMATOLOGY

## 2019-11-19 NOTE — TELEPHONE ENCOUNTER
Patient called to cancel INR check scheduled on 42/36 because it conflicts with another healthcare visit. He is scheduled for a MOHS procedure on 11/19 and 11/26. Called patient to reschedule INR. He was driving at the time, and stated he would call back to r/s. He did not hold warfarin for MOHS procedure today.      Claritza Duval, PharmD, [facility]  Medication Management Clinic   [place] [address] Ph: [phone]  [facility] Ph: [phone]  11/19/2019 8:40 AM

## 2019-11-20 ENCOUNTER — TELEPHONE (OUTPATIENT)
Dept: SURGERY | Age: 74
End: 2019-11-20

## 2019-11-25 NOTE — TELEPHONE ENCOUNTER
LVM with patient to r/s INR check. Informed him that the [facility] clinic is open on 11/26 if he wanted to try to coordinate with his derm visit.      Jennifer Fischer, PharmD, [facility]  Medication Management Clinic   [facility] [address] Ph: [phone]  [facility] Ph: [phone]  11/25/2019 11:14 AM

## 2019-11-26 ENCOUNTER — PROCEDURE VISIT (OUTPATIENT)
Dept: SURGERY | Age: 74
End: 2019-11-26
Payer: MEDICARE

## 2019-11-26 VITALS — HEART RATE: 84 BPM | TEMPERATURE: 98.2 F | OXYGEN SATURATION: 99 % | BODY MASS INDEX: 32.08 KG/M2 | WEIGHT: 230 LBS

## 2019-11-26 DIAGNOSIS — Z48.02 VISIT FOR SUTURE REMOVAL: ICD-10-CM

## 2019-11-26 DIAGNOSIS — C44.319 BASAL CELL CARCINOMA (BCC) OF LEFT MEDIAL CHEEK: Primary | ICD-10-CM

## 2019-11-26 PROCEDURE — 17311 MOHS 1 STAGE H/N/HF/G: CPT | Performed by: DERMATOLOGY

## 2019-11-26 PROCEDURE — 99024 POSTOP FOLLOW-UP VISIT: CPT | Performed by: DERMATOLOGY

## 2019-11-26 PROCEDURE — 17312 MOHS ADDL STAGE: CPT | Performed by: DERMATOLOGY

## 2019-11-26 PROCEDURE — 12053 INTMD RPR FACE/MM 5.1-7.5 CM: CPT | Performed by: DERMATOLOGY

## 2019-11-27 ENCOUNTER — TELEPHONE (OUTPATIENT)
Dept: SURGERY | Age: 74
End: 2019-11-27

## 2019-12-02 ENCOUNTER — TELEPHONE (OUTPATIENT)
Dept: SURGERY | Age: 74
End: 2019-12-02

## 2019-12-04 ENCOUNTER — TELEPHONE (OUTPATIENT)
Dept: SURGERY | Age: 74
End: 2019-12-04

## 2019-12-04 NOTE — TELEPHONE ENCOUNTER
Called to r/s INR check. Patient reports that he stopped warfarin on 11/30 due to ongoing bleeding from Mohs surgeries on 11/19 and 11/26. It does not appear that he was instructed by a healthcare provider to stop warfarin. Patient states the bleeding has eased, but he is waiting on a call from surgeon to discuss further. He will inform clinic when he resumes warfarin so that INR check can be rescheduled. Vince Quinteros.  Dany Deutsch, FernandoD, BCPS  [facility]  Ph: [phone]  12/4/2019 10:36 AM

## 2019-12-09 ENCOUNTER — OFFICE VISIT (OUTPATIENT)
Dept: SURGERY | Age: 74
End: 2019-12-09

## 2019-12-09 DIAGNOSIS — Z51.89 VISIT FOR WOUND CHECK: Primary | ICD-10-CM

## 2019-12-09 PROCEDURE — 99024 POSTOP FOLLOW-UP VISIT: CPT | Performed by: DERMATOLOGY

## 2019-12-16 RX ORDER — WARFARIN SODIUM 5 MG/1
TABLET ORAL
Qty: 100 TABLET | Refills: 3 | Status: SHIPPED | OUTPATIENT
Start: 2019-12-16 | End: 2021-03-05

## 2019-12-17 NOTE — TELEPHONE ENCOUNTER
Spoke with patient. He has still not resumed warfarin. Pt states the bleeding was 2/2 stitches and has slowed down to an \"ooze. \"  Encouraged pt to resume warfarin asap, when safe according to dermatologist. Asked pt to call when he resumes, so we can schedule INR check ~1 week after resuming.      Jennifer Fischer, PharmD, [facility]  Medication Management Clinic   [place] [id number] Ph: [phone]  [facility] Ph: [phone]  12/17/2019 9:07 AM

## 2020-01-06 NOTE — TELEPHONE ENCOUNTER
Spoke with patient regarding re-starting warfarin. He states he started taking it again on 12/30. Patient scheduled INR check for Thursday 1/9 (should have received 10 doses of warfarin at this point).      Girish Victoria, PharmD, [facility]  [facility]  [place]: [phone]  [place]: [phone]  1/6/2020 1:11 PM

## 2020-01-08 NOTE — TELEPHONE ENCOUNTER
Note: if patient no-shows to INR check on 1/9, will discharge from [facility] due to ongoing noncompliance with clinic visits and warfarin dosing instructions. He has not been seen in clinic for 3 months. Norberto Cagle.  Estella KingD, BCPS  [facility]  Ph: [phone]  1/8/2020 4:07 PM

## 2020-01-09 ENCOUNTER — ANTI-COAG VISIT (OUTPATIENT)
Dept: PHARMACY | Age: 75
End: 2020-01-09
Payer: MEDICARE

## 2020-01-09 LAB — INTERNATIONAL NORMALIZATION RATIO, POC: 2

## 2020-01-09 PROCEDURE — 99211 OFF/OP EST MAY X REQ PHY/QHP: CPT

## 2020-01-09 PROCEDURE — 85610 PROTHROMBIN TIME: CPT

## 2020-01-09 NOTE — PROGRESS NOTES
ANTICOAGULATION SERVICE    Caron Montes is a 76 y.o. male with PMHx significant for A-fib, HTN, HLD who presents to clinic 1/9/2020 for anticoagulation monitoring and adjustment.     Anticoagulation Indication(s):  Afib    Referring Physician:  Dr. Alexander Ordonez  Goal INR Range:  1.8-2.2   Duration of Anticoagulation Therapy:  Indefinite  Time of day dose taken:  PM  Product patient has at home:  warfarin 5 mg (peach)    [id number] Score for Atrial Fibrillation Stroke Risk   Risk   Factors  Component Value   C CHF No 0   H HTN Yes 1   A2 Age >= 75 No,  (71 y.o.) 0   D DM No 0   S2 Prior Stroke/TIA No 0   V Vascular Disease No 0   A Age 74-69 Yes,  (71 y.o.) 1   Sc Sex male 0    [id number]  Score  2   Score last updated 1/9/20 9:42 AM    INR Summary                            Warfarin regimen (mg)  Date INR   A/P    Sun Mon Tue Wed Thu Fri Sat Mg/wk  1/9 2.0 At goal, no change  5 5 5 5 5 7.5 5 37.5  10/9 2.7 Above goal, reduce x 1 5 5 5 5/7.5 5 5 5 37.5  7/31 2.2 At goal, no change  5 5 5 7.5 5 5 5 37.5  6/19 1.9 At goal, no change  5 5 5 7.5 5 5 5 37.5  5/8 2.0 At goal, no change  5 5 5 7.5 5 5 5 37.5  4/3 1.7 Below goal, continue  5 5 5 7.5 5 5 5 37.5  2/27 2.4 Above goal, continue  5 5 5 7.5 5 5 5 37.5   1/25 2.7 Above goal, continue  5 5 5 7.5 5 5 5 37.5   12/19 1.6 Below goal (off warfarin) 5 5 5 7.5 5 5 5 37.5  11/14 2.4 Above goal, continue  5 5 5 7.5 5 5 5 37.5  10/3 1.5 Below goal, continue  5 5 5 7.5 5 5 5 37.5  9/5 2.4 Above goal, continue  5 5 5 7.5 5 5 5 37.5  8/22 1.4 Below goal, increase  5 5 5 7.5 5 5 5 37.5  7/26 1.9 At goal, no change  5 5 5 5 5 5 5 35  7/2 1.3 Below goal, increase  5 5 5 5 5 5 5 35  6/20 1.2 Below goal, increase  2.5 5 2.5 5 2.5 5 5 27.5   6/7 1.2 Below goal, increase  2.5 5 2.5 2.5 5 2.5 2.5 22.5  6/2 --- Resume warfarin  2.5 2.5 2.5 2.5 2.5 2.5 2.5 17.5  Mar-May  Patient self-d/c'd warfarin  2/28 2.5 At goal, no change  5 7.5 5 5 5 5 5 37.5  1/17 2.1 At goal, no change  5 7.5 5 5 5 5 5 37.5  12/13 2.4 At goal, no change  5 7.5 5 5 5 5 5 37.5  11/15 2.2 At goal, no change  5 7.5 5 5 5 5 5 37.5  10/18 2.8 At goal, no change  5 7.5 5 5 5 5 5 37.5      Patient History:  Recent hospitalizations/HC visits 11/19 MOHS surgery for basal cell carcinoma of forehead    Recent medication changes None reported today   Medications taken regularly that may interact with warfarin or alter INR ASA 81 mg, levothyroxine   Warfarin dose taken as prescribed -Patient held warfarin 11/30-12/30 due to bleeding from Mohs procedure (was not instructed to hold by a healthcare provider)  -H/o noncompliance with warfarin dosing instructions  -Does not use pillbox   Signs/symptoms of bleeding None reported today  H/o anal fistula which bleeds off and on   Vitamin K intake -Normally has ~1-2 serving of green, leafy vegetables per week (usually broccoli once per week), has iceburg salad 3-4x per week  -Drinks 1 glass of V8 juice occassionally   Recent vomiting/diarrhea/fever, changes in weight or activity level Patient claims to be very tired all of the time and has to stop FPC between the parking lot and clinic office because he runs out of energy. This is an ongoing problem. Tobacco or alcohol use -Patient reports quitting smoking 47 years ago  -Patient reports having 1-2 beers or shot of whiskey per day   Upcoming surgeries or procedures -Patient declined to consider Watchman procedure as of now  -Patient decided against DCCV due to need for weekly INR checks     Assessment/Plan:  Patient's INR was therapeutic today (2.0). Since INR goal range is narrow, and patient refuses to come to clinic more than once monthly due to difficulty walking and cost burden, will consider INR of 1.5-2.5 acceptable. He held warfarin x 1 month as noted above due to bleeding from MOHS surgery. He resumed warfarin on 12/30, and has been taking 7.5 mg on Fridays instead of Wednesdays.   Patient is often noncompliant with clinic visits (has not been seen since October) and warfarin dosing instructions. He self-doses warfarin and often holds it without being instructed to do so by a healthcare provider. He denies any bleeding or medication changes today. Patient was instructed to continue warfarin 7.5 mg on Fridays, and 5 mg all other days. Repeat INR in 6 weeks (refused to come any sooner). Patient was instructed to maintain consistent vitamin K intake and call with any bleeding, medication changes, or fever/vomiting/diarrhea. Of note, patient has difficulty walking due to having polio as a child. It is becoming increasingly difficult for him to come to clinic for INR monitoring. Have discussed alternative options with patient on multiple occasions. He refuses switch to DOAC due to high cost.  Also suggested that patient look into home INR monitoring devices and provided information, but he has not done so. Also discussed possibility of home care visits and suggested that patient discuss with PCP, which he has not done. Patient understands dosing directions and information discussed. Dosing schedule and follow up appointment given to patient. Progress note routed to referring physician's office. Patient acknowledges working in consult agreement with pharmacist as referred by his/her physician. Next Clinic Appointment:  2/19    Please call Kiah at [phone] with any questions. Thanks! Char Shepard.  Guillermina Mancia, FernandoD, BCPS  [facility]  Ph: [phone]  1/9/2020 9:07 AM

## 2020-01-21 ENCOUNTER — OFFICE VISIT (OUTPATIENT)
Dept: SURGERY | Age: 75
End: 2020-01-21

## 2020-01-21 PROCEDURE — 99024 POSTOP FOLLOW-UP VISIT: CPT | Performed by: DERMATOLOGY

## 2020-02-19 ENCOUNTER — ANTI-COAG VISIT (OUTPATIENT)
Dept: PHARMACY | Age: 75
End: 2020-02-19
Payer: MEDICARE

## 2020-02-19 LAB — INTERNATIONAL NORMALIZATION RATIO, POC: 2.5

## 2020-02-19 PROCEDURE — 85610 PROTHROMBIN TIME: CPT | Performed by: PHARMACIST

## 2020-02-19 PROCEDURE — 99211 OFF/OP EST MAY X REQ PHY/QHP: CPT | Performed by: PHARMACIST

## 2020-03-30 ENCOUNTER — TELEPHONE (OUTPATIENT)
Dept: PHARMACY | Age: 75
End: 2020-03-30

## 2020-03-30 NOTE — TELEPHONE ENCOUNTER
Called and spoke with patient to reschedule Anticoagulation Service - INR appointment currently scheduled for 4/1 in effort to minimize risk of exposure and spread of COVID-19. Patient is not interested in rescheduling at this time. Offered patient the option of lab draw INR instead, but he declined, stating he will wait until [address] reopens to get INR checked. Instructed patient to continue stable warfarin dose of 5 mg daily, except 7.5 mg on Fridays. Informed patient that order for lab INR will be placed in his chart in case he changes his mind. Standing order for PT/INR has been placed to facilitate transition to remote INR monitoring given efforts to reduce the spread of COVID-19. CLINICAL PHARMACY CONSULT: MED RECONCILIATION/REVIEW ADDENDUM    For Pharmacy Admin Tracking Only    PHSO: Yes  Total # of Interventions Recommended: 0  - Maintenance Safety Lab Monitoring #: 1  Total Interventions Accepted: 0  Time Spent (min): 5    Cary Salazar, PharmD, BCPS  [facility]  Ph: [phone]  3/30/2020 11:18 AM

## 2020-05-11 ENCOUNTER — TELEPHONE (OUTPATIENT)
Dept: PHARMACY | Age: 75
End: 2020-05-11

## 2020-05-12 ENCOUNTER — ANTI-COAG VISIT (OUTPATIENT)
Dept: PHARMACY | Age: 75
End: 2020-05-12
Payer: MEDICARE

## 2020-05-12 LAB — INTERNATIONAL NORMALIZATION RATIO, POC: 2.6

## 2020-05-12 PROCEDURE — 99212 OFFICE O/P EST SF 10 MIN: CPT

## 2020-05-12 PROCEDURE — 85610 PROTHROMBIN TIME: CPT

## 2020-05-12 NOTE — PROGRESS NOTES
ANTICOAGULATION SERVICE    Anders Coburn is a 76 y.o. male with PMHx significant for A-fib, HTN, HLD who presents to clinic 5/12/2020 for anticoagulation monitoring and adjustment.     Anticoagulation Indication(s):  Afib    Referring Physician:  Dr. Terrazas Pass  Goal INR Range:  1.8-2.2, but 1.5-2.5 acceptable as patient refuses frequent INR monitoring  Duration of Anticoagulation Therapy:  Indefinite  Time of day dose taken:  PM  Product patient has at home:  warfarin 5 mg (peach)    [id number] Score for Atrial Fibrillation Stroke Risk   Risk   Factors  Component Value   C CHF No 0   H HTN Yes 1   A2 Age >= 75 No,  (71 y.o.) 0   D DM No 0   S2 Prior Stroke/TIA No 0   V Vascular Disease No 0   A Age 74-69 Yes,  (71 y.o.) 1   Sc Sex male 0    [id number]  Score  2   Score last updated 1/9/20 9:42 AM    INR Summary                            Warfarin regimen (mg)  Date INR   A/P    Sun Mon Tue Wed Thu Fri Sat Mg/wk  5/12 2.6 Above goal, decrease  5 5 5 5 5 5 5 35  2/19 2.5 At goal, no change  5 5 5 5 5 7.5 5 37.5  1/9 2.0 At goal, no change  5 5 5 5 5 7.5 5 37.5  10/9 2.7 Above goal, reduce x 1 5 5 5 5/7.5 5 5 5 37.5  7/31 2.2 At goal, no change  5 5 5 7.5 5 5 5 37.5  6/19 1.9 At goal, no change  5 5 5 7.5 5 5 5 37.5  5/8 2.0 At goal, no change  5 5 5 7.5 5 5 5 37.5  4/3 1.7 At goal, no change  5 5 5 7.5 5 5 5 37.5  2/27 2.4 At goal, no change  5 5 5 7.5 5 5 5 37.5   1/25 2.7 Above goal, continue  5 5 5 7.5 5 5 5 37.5   12/19 1.6 At goal (off warfarin)  5 5 5 7.5 5 5 5 37.5  11/14 2.4 At goal, no change  5 5 5 7.5 5 5 5 37.5  10/3 1.5 At goal, no change  5 5 5 7.5 5 5 5 37.5  9/5 2.4 At goal, no change  5 5 5 7.5 5 5 5 37.5  8/22 1.4 Below goal, increase  5 5 5 7.5 5 5 5 37.5  7/26 1.9 At goal, no change  5 5 5 5 5 5 5 35  7/2 1.3 Below goal, increase  5 5 5 5 5 5 5 35  6/20 1.2 Below goal, increase  2.5 5 2.5 5 2.5 5 5 27.5   6/7 1.2 Below goal, increase  2.5 5 2.5 2.5 5 2.5 2.5 22.5  6/2 --- Resume

## 2020-07-08 ENCOUNTER — TELEPHONE (OUTPATIENT)
Dept: PHARMACY | Age: 75
End: 2020-07-08

## 2020-07-08 NOTE — TELEPHONE ENCOUNTER
Patient is due for INR check (last check on 5/12). Called and spoke with patient. He prefers drive-thru option if possible because walking is becoming increasingly difficult. Offered appointment at [facility] clinic where curbside INR checks are being performed. Patient is not familiar with the [facility] area and would like to find the location prior to scheduling. Provided patient with address of clinic and physical description of building. He will call back to schedule appointment. Leesa Correa.  Waleska Pimentel, PharmD, BCPS  [facility]  Ph: [phone]  7/8/2020 11:23 AM

## 2020-07-13 NOTE — TELEPHONE ENCOUNTER
Patient stopped by [facility] ED today to check out the drive-thru location. There was no pharmacist on site to see him for INR check today, so he will call back soon to schedule appointment for curbside INR. Iraida Black.  Pamela KingD, BCPS  [facility]  Ph: [phone]  7/13/2020 6:07 PM

## 2020-07-15 NOTE — TELEPHONE ENCOUNTER
Pt scheduled 7/21 at [facility].      Tone Saldivar, PharmD, [facility]  Medication Management Clinic   [facility] [address] Ph: [phone]  [facility] Ph: [phone]  7/15/2020 2:26 PM

## 2020-07-21 ENCOUNTER — ANTI-COAG VISIT (OUTPATIENT)
Dept: PHARMACY | Age: 75
End: 2020-07-21
Payer: MEDICARE

## 2020-07-21 LAB — INTERNATIONAL NORMALIZATION RATIO, POC: 2.2

## 2020-07-21 PROCEDURE — 99211 OFF/OP EST MAY X REQ PHY/QHP: CPT

## 2020-07-21 PROCEDURE — 85610 PROTHROMBIN TIME: CPT

## 2020-07-21 NOTE — PROGRESS NOTES
ANTICOAGULATION SERVICE    Hanh Fitch is a 76 y.o. male with PMHx significant for A-fib, HTN, HLD who presents to drive thru clinic 1/20/9691 for anticoagulation monitoring and adjustment.     Anticoagulation Indication(s):  Afib    Referring Physician:  Dr. Tamara Live  Goal INR Range:  1.8-2.2, but 1.5-2.5 acceptable as patient refuses frequent INR monitoring  Duration of Anticoagulation Therapy:  Indefinite  Time of day dose taken:  PM  Product patient has at home:  warfarin 5 mg (peach)    [id number] Score for Atrial Fibrillation Stroke Risk   Risk   Factors  Component Value   C CHF No 0   H HTN Yes 1   A2 Age >= 76 Yes,  (69 y.o.) 2   D DM No 0   S2 Prior Stroke/TIA No 0   V Vascular Disease No 0   A Age 74-69 No,  (69 y.o.) 0   Sc Sex male 0    [id number]  Score  3   Score last updated 1/9/20 9:42 AM    INR Summary                            Warfarin regimen (mg)  Date INR   A/P    Sun Mon Tue Wed Thu Fri Sat Mg/wk  7/21 2.2 At goal, no change  5 5 5 5 5 5 5 35  5/12 2.6 Above goal, decrease  5 5 5 5 5 5 5 35  2/19 2.5 At goal, no change  5 5 5 5 5 7.5 5 37.5  1/9 2.0 At goal, no change  5 5 5 5 5 7.5 5 37.5  10/9 2.7 Above goal, reduce x 1 5 5 5 5/7.5 5 5 5 37.5  7/31 2.2 At goal, no change  5 5 5 7.5 5 5 5 37.5  6/19 1.9 At goal, no change  5 5 5 7.5 5 5 5 37.5  5/8 2.0 At goal, no change  5 5 5 7.5 5 5 5 37.5  4/3 1.7 At goal, no change  5 5 5 7.5 5 5 5 37.5  2/27 2.4 At goal, no change  5 5 5 7.5 5 5 5 37.5   1/25 2.7 Above goal, continue  5 5 5 7.5 5 5 5 37.5   12/19 1.6 At goal (off warfarin)  5 5 5 7.5 5 5 5 37.5  11/14 2.4 At goal, no change  5 5 5 7.5 5 5 5 37.5  10/3 1.5 At goal, no change  5 5 5 7.5 5 5 5 37.5  9/5 2.4 At goal, no change  5 5 5 7.5 5 5 5 37.5  8/22 1.4 Below goal, increase  5 5 5 7.5 5 5 5 37.5  7/26 1.9 At goal, no change  5 5 5 5 5 5 5 35  7/2 1.3 Below goal, increase  5 5 5 5 5 5 5 35  6/20 1.2 Below goal, increase  2.5 5 2.5 5 2.5 5 5 27.5   6/7 1.2 Below goal, increase  2.5 5 2.5 2.5 5 2.5 2.5 22.5  6/2 --- Resume warfarin  2.5 2.5 2.5 2.5 2.5 2.5 2.5 17.5  Mar-May  Patient self-d/c'd warfarin  2/28 2.5 At goal, no change  5 7.5 5 5 5 5 5 37.5  1/17 2.1 At goal, no change  5 7.5 5 5 5 5 5 37.5  12/13 2.4 At goal, no change  5 7.5 5 5 5 5 5 37.5  11/15 2.2 At goal, no change  5 7.5 5 5 5 5 5 37.5  10/18 2.8 At goal, no change  5 7.5 5 5 5 5 5 37.5      Patient History:  Recent hospitalizations/HC visits 11/19 MOHS surgery for basal cell carcinoma of forehead    Recent medication changes None reported today   Medications taken regularly that may interact with warfarin or alter INR ASA 81 mg, levothyroxine   Warfarin dose taken as prescribed -Yes  -H/o noncompliance with warfarin dosing instructions  -Does not use pillbox   Signs/symptoms of bleeding None reported today  H/o anal fistula which bleeds off and on   Vitamin K intake -Normally has ~1-2 serving of green, leafy vegetables per week (usually broccoli once per week), has iceburg salad 3-4x per week  -Drinks 1 glass of V8 juice occassionally   Recent vomiting/diarrhea/fever, changes in weight or activity level Patient claims to be very tired all of the time and has to stop [facility] between the parking lot and clinic office because he runs out of energy. This is an ongoing problem. Tobacco or alcohol use -Patient reports quitting smoking 47 years ago  -Patient reports having 1-2 beers or shot of whiskey per day   Upcoming surgeries or procedures -Patient declined to consider Watchman procedure as of now  -Patient decided against DCCV due to need for weekly INR checks     Assessment/Plan:  Patient's INR was therapeutic today (2.2). Since INR goal range is narrow, and patient refuses to come to clinic more than once monthly due to difficulty walking and cost burden, will consider INR of 1.5-2.5 acceptable. Patient is often noncompliant with clinic visits and warfarin dosing instructions.   He self-doses warfarin and

## 2020-09-01 ENCOUNTER — ANTI-COAG VISIT (OUTPATIENT)
Dept: PHARMACY | Age: 75
End: 2020-09-01
Payer: MEDICARE

## 2020-09-01 LAB — INTERNATIONAL NORMALIZATION RATIO, POC: 2.5

## 2020-09-01 PROCEDURE — 99211 OFF/OP EST MAY X REQ PHY/QHP: CPT

## 2020-09-01 PROCEDURE — 85610 PROTHROMBIN TIME: CPT

## 2020-09-01 NOTE — PROGRESS NOTES
ANTICOAGULATION SERVICE    Christiano Collado is a 76 y.o. male with PMHx significant for A-fib, HTN, HLD who presents to drive thru clinic 3/6/3698 for anticoagulation monitoring and adjustment.     Anticoagulation Indication(s):  Afib    Referring Physician:  Dr. Giuseppe Jacobo  Goal INR Range:  1.8-2.2, but 1.5-2.5 acceptable as patient refuses frequent INR monitoring  Duration of Anticoagulation Therapy:  Indefinite  Time of day dose taken:  PM  Product patient has at home:  warfarin 5 mg (peach)    [id number] Score for Atrial Fibrillation Stroke Risk   Risk   Factors  Component Value   C CHF No 0   H HTN Yes 1   A2 Age >= 76 Yes,  (69 y.o.) 2   D DM No 0   S2 Prior Stroke/TIA No 0   V Vascular Disease No 0   A Age 74-69 No,  (69 y.o.) 0   Sc Sex male 0    [id number]  Score  3   Score last updated 1/9/20 9:42 AM    INR Summary                            Warfarin regimen (mg)  Date INR   A/P    Sun Mon Tue Wed Thu Fri Sat Mg/wk  9/1 2.5 Above goal, continue  5 5 5 5 5 5 5 35  7/21 2.2 At goal, no change  5 5 5 5 5 5 5 35  5/12 2.6 Above goal, decrease  5 5 5 5 5 5 5 35  2/19 2.5 At goal, no change  5 5 5 5 5 7.5 5 37.5  1/9 2.0 At goal, no change  5 5 5 5 5 7.5 5 37.5  10/9 2.7 Above goal, reduce x 1 5 5 5 5/7.5 5 5 5 37.5  7/31 2.2 At goal, no change  5 5 5 7.5 5 5 5 37.5  6/19 1.9 At goal, no change  5 5 5 7.5 5 5 5 37.5  5/8 2.0 At goal, no change  5 5 5 7.5 5 5 5 37.5  4/3 1.7 At goal, no change  5 5 5 7.5 5 5 5 37.5  2/27 2.4 At goal, no change  5 5 5 7.5 5 5 5 37.5   1/25 2.7 Above goal, continue  5 5 5 7.5 5 5 5 37.5   12/19 1.6 At goal (off warfarin)  5 5 5 7.5 5 5 5 37.5  11/14 2.4 At goal, no change  5 5 5 7.5 5 5 5 37.5  10/3 1.5 At goal, no change  5 5 5 7.5 5 5 5 37.5  9/5 2.4 At goal, no change  5 5 5 7.5 5 5 5 37.5  8/22 1.4 Below goal, increase  5 5 5 7.5 5 5 5 37.5  7/26 1.9 At goal, no change  5 5 5 5 5 5 5 35  7/2 1.3 Below goal, increase  5 5 5 5 5 5 5 35  6/20 1.2 Below goal, increase  2.5 5 2.5 5 2.5 5 5 27.5   6/7 1.2 Below goal, increase  2.5 5 2.5 2.5 5 2.5 2.5 22.5  6/2 --- Resume warfarin  2.5 2.5 2.5 2.5 2.5 2.5 2.5 17.5  Mar-May  Patient self-d/c'd warfarin  2/28 2.5 At goal, no change  5 7.5 5 5 5 5 5 37.5  1/17 2.1 At goal, no change  5 7.5 5 5 5 5 5 37.5  12/13 2.4 At goal, no change  5 7.5 5 5 5 5 5 37.5  11/15 2.2 At goal, no change  5 7.5 5 5 5 5 5 37.5  10/18 2.8 At goal, no change  5 7.5 5 5 5 5 5 37.5      Patient History:  Recent hospitalizations/HC visits 11/19 MOHS surgery for basal cell carcinoma of forehead    Recent medication changes None reported today   Medications taken regularly that may interact with warfarin or alter INR ASA 81 mg, levothyroxine   Warfarin dose taken as prescribed -Yes  -H/o noncompliance with warfarin dosing instructions  -Does not use pillbox   Signs/symptoms of bleeding None reported today  H/o anal fistula which bleeds off and on   Vitamin K intake -Normally has ~1-2 serving of green, leafy vegetables per week (usually broccoli once per week), has iceburg salad 3-4x per week  -Drinks 1 glass of V8 juice occassionally   Recent vomiting/diarrhea/fever, changes in weight or activity level Patient claims to be very tired all of the time and has to stop long-term between the parking lot and clinic office because he runs out of energy. This is an ongoing problem. Tobacco or alcohol use -Patient reports quitting smoking 47 years ago  -Patient reports having 1-2 beers or shot of whiskey per day   Upcoming surgeries or procedures -Patient declined to consider Watchman procedure as of now  -Patient decided against DCCV due to need for weekly INR checks     Assessment/Plan:  Patient's INR was slightly supratherapeutic today (2.5). Since INR goal range is narrow, and patient refuses to come to clinic more than once monthly due to difficulty walking and cost burden, will consider INR of 1.5-2.5 acceptable.   Patient is often noncompliant with clinic visits and warfarin dosing instructions. He self-doses warfarin and often holds it without being instructed to do so by a healthcare provider. He denies any recent warfarin dosing errors, medication or diet changes, or bleeding. Patient was instructed to continue warfarin 5 mg daily. Repeat INR in 6 weeks (patient refuses to come any sooner due to [place] pandemic). Patient was instructed to maintain consistent vitamin K intake and call with any bleeding, medication changes, or fever/vomiting/diarrhea. Of note, patient has difficulty walking due to having polio as a child. It is becoming increasingly difficult for him to come to clinic for INR monitoring. Have discussed alternative options with patient on multiple occasions. He refuses switch to DOAC due to high cost.  Also suggested that patient look into home INR monitoring devices and provided information, but he has not done so. Also discussed possibility of home care visits and suggested that patient discuss with PCP, which he has not done. Patient understands dosing directions and information discussed. Dosing schedule and follow up appointment given to patient. Progress note routed to referring physician's office. Patient acknowledges working in consult agreement with pharmacist as referred by his/her physician. Next Clinic Appointment:  10/13    Please call Kiah at [phone] with any questions. Thanks!   Tone Saldivar, PharmD, [facility]  Medication Management Clinic   [address] Ph: [phone]  [facility] Ph: [phone]  9/1/2020 9:21 AM    CLINICAL PHARMACY CONSULT: MED RECONCILIATION/REVIEW ADDENDUM    For Pharmacy Admin Tracking Only    PHSO: Yes  Total # of Interventions Recommended: 0  - Maintenance Safety Lab Monitoring #: 1  Total Interventions Accepted: 0  Time Spent (min): 15

## 2020-09-18 ENCOUNTER — TELEPHONE (OUTPATIENT)
Dept: INTERNAL MEDICINE CLINIC | Age: 75
End: 2020-09-18

## 2020-09-18 RX ORDER — LEVOTHYROXINE SODIUM 0.12 MG/1
TABLET ORAL
Qty: 30 TABLET | Refills: 0 | Status: SHIPPED | OUTPATIENT
Start: 2020-09-18 | End: 2020-11-03

## 2020-09-18 RX ORDER — ATENOLOL 50 MG/1
TABLET ORAL
Qty: 90 TABLET | Refills: 3 | Status: SHIPPED | OUTPATIENT
Start: 2020-09-18 | End: 2021-09-23

## 2020-09-18 NOTE — TELEPHONE ENCOUNTER
Not seen since 2018  Appointment needed when refill is finished   AWV )NEEDED Left message on machine

## 2020-09-18 NOTE — TELEPHONE ENCOUNTER
Patient called to follow up refill request    Levothyroxine Sodium 125 MCG Take 1 tablet by mouth once daily     Atenolol 50 MG TAKE 1 TABLET BY MOUTH ONCE DAILY     [address] [phone] - f [phone]     Please advise

## 2020-09-21 RX ORDER — ATENOLOL 50 MG/1
TABLET ORAL
Qty: 90 TABLET | Refills: 0 | OUTPATIENT
Start: 2020-09-21

## 2020-09-21 RX ORDER — LEVOTHYROXINE SODIUM 125 UG/1
TABLET ORAL
Qty: 90 TABLET | Refills: 0 | OUTPATIENT
Start: 2020-09-21

## 2020-10-13 ENCOUNTER — ANTI-COAG VISIT (OUTPATIENT)
Dept: PHARMACY | Age: 75
End: 2020-10-13
Payer: MEDICARE

## 2020-10-13 LAB — INTERNATIONAL NORMALIZATION RATIO, POC: 2.3

## 2020-10-13 PROCEDURE — 99211 OFF/OP EST MAY X REQ PHY/QHP: CPT

## 2020-10-13 PROCEDURE — 85610 PROTHROMBIN TIME: CPT

## 2020-10-13 NOTE — PROGRESS NOTES
ANTICOAGULATION SERVICE    Eula Connell is a 76 y.o. male with PMHx significant for A-fib, HTN, HLD who presents to drive thru clinic 17/75/1144 for anticoagulation monitoring and adjustment.     Anticoagulation Indication(s):  Afib    Referring Physician:  Dr. Jony Landry  Goal INR Range:  1.8-2.2, but 1.5-2.5 acceptable as patient refuses frequent INR monitoring  Duration of Anticoagulation Therapy:  Indefinite  Time of day dose taken:  PM  Product patient has at home:  warfarin 5 mg (peach)    [id number] Score for Atrial Fibrillation Stroke Risk   Risk   Factors  Component Value   C CHF No 0   H HTN Yes 1   A2 Age >= 76 Yes,  (69 y.o.) 2   D DM No 0   S2 Prior Stroke/TIA No 0   V Vascular Disease No 0   A Age 74-69 No,  (69 y.o.) 0   Sc Sex male 0    [id number]  Score  3   Score last updated 1/9/20 9:42 AM    INR Summary                            Warfarin regimen (mg)  Date INR   A/P    Sun Mon Tue Wed Thu Fri Sat Mg/wk  10/13 2.3 At goal, no change  5 5 5 5 5 5 5 35  9/1 2.5 Above goal, continue  5 5 5 5 5 5 5 35  7/21 2.2 At goal, no change  5 5 5 5 5 5 5 35  5/12 2.6 Above goal, decrease  5 5 5 5 5 5 5 35  2/19 2.5 At goal, no change  5 5 5 5 5 7.5 5 37.5  1/9 2.0 At goal, no change  5 5 5 5 5 7.5 5 37.5  10/9 2.7 Above goal, reduce x 1 5 5 5 5/7.5 5 5 5 37.5  7/31 2.2 At goal, no change  5 5 5 7.5 5 5 5 37.5  6/19 1.9 At goal, no change  5 5 5 7.5 5 5 5 37.5  5/8 2.0 At goal, no change  5 5 5 7.5 5 5 5 37.5  4/3 1.7 At goal, no change  5 5 5 7.5 5 5 5 37.5  2/27 2.4 At goal, no change  5 5 5 7.5 5 5 5 37.5   1/25 2.7 Above goal, continue  5 5 5 7.5 5 5 5 37.5   12/19 1.6 At goal (off warfarin)  5 5 5 7.5 5 5 5 37.5  11/14 2.4 At goal, no change  5 5 5 7.5 5 5 5 37.5  10/3 1.5 At goal, no change  5 5 5 7.5 5 5 5 37.5  9/5 2.4 At goal, no change  5 5 5 7.5 5 5 5 37.5  8/22 1.4 Below goal, increase  5 5 5 7.5 5 5 5 37.5  7/26 1.9 At goal, no change  5 5 5 5 5 5 5 35  7/2 1.3 Below goal, acceptable. Patient is often noncompliant with clinic visits and warfarin dosing instructions. He self-doses warfarin and often holds it without being instructed to do so by a healthcare provider. He denies any recent warfarin dosing errors, medication or diet changes, or bleeding. Patient was instructed to continue warfarin 5 mg daily. Repeat INR in 6 weeks (patient refuses to come any sooner due to [place] pandemic). Patient was instructed to maintain consistent vitamin K intake and call with any bleeding, medication changes, or fever/vomiting/diarrhea. Of note, patient has difficulty walking due to having polio as a child. It is becoming increasingly difficult for him to come to clinic for INR monitoring. Have discussed alternative options with patient on multiple occasions. He refuses switch to DOAC due to high cost.  Also suggested that patient look into home INR monitoring devices and provided information, but he has not done so. Also discussed possibility of home care visits and suggested that patient discuss with PCP, which he has not done. Patient understands dosing directions and information discussed. Dosing schedule and follow up appointment given to patient. Progress note routed to referring physician's office. Patient acknowledges working in consult agreement with pharmacist as referred by his/her physician. Next Clinic Appointment:  11/24    Please call Kiah at [phone] with any questions. Thanks!   Luc Martini, PharmD, [facility]  Medication Management Clinic   [address] Ph: [phone]  [facility] Ph: [phone]  10/13/2020 9:24 AM    CLINICAL PHARMACY CONSULT: MED RECONCILIATION/REVIEW ADDENDUM    For Pharmacy Admin Tracking Only    PHSO: Yes  Total # of Interventions Recommended: 0  - Maintenance Safety Lab Monitoring #: 1  Total Interventions Accepted: 0  Time Spent (min): 15

## 2020-11-03 ENCOUNTER — TELEPHONE (OUTPATIENT)
Dept: INTERNAL MEDICINE CLINIC | Age: 75
End: 2020-11-03

## 2020-11-04 ENCOUNTER — VIRTUAL VISIT (OUTPATIENT)
Dept: INTERNAL MEDICINE CLINIC | Age: 75
End: 2020-11-04
Payer: MEDICARE

## 2020-11-04 PROCEDURE — 99214 OFFICE O/P EST MOD 30 MIN: CPT | Performed by: INTERNAL MEDICINE

## 2020-11-04 RX ORDER — SIMVASTATIN 80 MG
TABLET ORAL
Qty: 90 TABLET | Refills: 2 | Status: SHIPPED | OUTPATIENT
Start: 2020-11-04 | End: 2021-02-11

## 2020-11-04 SDOH — ECONOMIC STABILITY: TRANSPORTATION INSECURITY
IN THE PAST 12 MONTHS, HAS LACK OF TRANSPORTATION KEPT YOU FROM MEETINGS, WORK, OR FROM GETTING THINGS NEEDED FOR DAILY LIVING?: NO

## 2020-11-04 SDOH — ECONOMIC STABILITY: TRANSPORTATION INSECURITY
IN THE PAST 12 MONTHS, HAS THE LACK OF TRANSPORTATION KEPT YOU FROM MEDICAL APPOINTMENTS OR FROM GETTING MEDICATIONS?: NO

## 2020-11-04 SDOH — ECONOMIC STABILITY: FOOD INSECURITY: WITHIN THE PAST 12 MONTHS, THE FOOD YOU BOUGHT JUST DIDN'T LAST AND YOU DIDN'T HAVE MONEY TO GET MORE.: NEVER TRUE

## 2020-11-04 SDOH — ECONOMIC STABILITY: INCOME INSECURITY: HOW HARD IS IT FOR YOU TO PAY FOR THE VERY BASICS LIKE FOOD, HOUSING, MEDICAL CARE, AND HEATING?: NOT HARD AT ALL

## 2020-11-04 SDOH — ECONOMIC STABILITY: FOOD INSECURITY: WITHIN THE PAST 12 MONTHS, YOU WORRIED THAT YOUR FOOD WOULD RUN OUT BEFORE YOU GOT MONEY TO BUY MORE.: NEVER TRUE

## 2020-11-04 ASSESSMENT — PATIENT HEALTH QUESTIONNAIRE - PHQ9
1. LITTLE INTEREST OR PLEASURE IN DOING THINGS: 0
2. FEELING DOWN, DEPRESSED OR HOPELESS: 0
SUM OF ALL RESPONSES TO PHQ QUESTIONS 1-9: 0
SUM OF ALL RESPONSES TO PHQ QUESTIONS 1-9: 0
SUM OF ALL RESPONSES TO PHQ9 QUESTIONS 1 & 2: 0
SUM OF ALL RESPONSES TO PHQ QUESTIONS 1-9: 0

## 2020-11-04 ASSESSMENT — LIFESTYLE VARIABLES
HAS A RELATIVE, FRIEND, DOCTOR, OR ANOTHER HEALTH PROFESSIONAL EXPRESSED CONCERN ABOUT YOUR DRINKING OR SUGGESTED YOU CUT DOWN: 0
HOW OFTEN DURING THE LAST YEAR HAVE YOU HAD A FEELING OF GUILT OR REMORSE AFTER DRINKING: 0
HOW OFTEN DURING THE LAST YEAR HAVE YOU NEEDED AN ALCOHOLIC DRINK FIRST THING IN THE MORNING TO GET YOURSELF GOING AFTER A NIGHT OF HEAVY DRINKING: 0
HAVE YOU OR SOMEONE ELSE BEEN INJURED AS A RESULT OF YOUR DRINKING: 0
HOW OFTEN DURING THE LAST YEAR HAVE YOU BEEN UNABLE TO REMEMBER WHAT HAPPENED THE NIGHT BEFORE BECAUSE YOU HAD BEEN DRINKING: 0
AUDIT-C TOTAL SCORE: 3
HOW OFTEN DURING THE LAST YEAR HAVE YOU FAILED TO DO WHAT WAS NORMALLY EXPECTED FROM YOU BECAUSE OF DRINKING: 0
AUDIT TOTAL SCORE: 3
HOW MANY STANDARD DRINKS CONTAINING ALCOHOL DO YOU HAVE ON A TYPICAL DAY: 0
HOW OFTEN DO YOU HAVE A DRINK CONTAINING ALCOHOL: 3
HOW OFTEN DO YOU HAVE SIX OR MORE DRINKS ON ONE OCCASION: 0
HOW OFTEN DURING THE LAST YEAR HAVE YOU FOUND THAT YOU WERE NOT ABLE TO STOP DRINKING ONCE YOU HAD STARTED: 0

## 2020-11-04 NOTE — PROGRESS NOTES
Medicare Annual Wellness Visit  Are Name: Sandy Collado Date: 2020   MRN: [id number] Sex: Male   Age: 76 y.o. Ethnicity: Non-/Non    : 1945 Race: Viky Hall is here for Medicare AWV    Screenings for behavioral, psychosocial and functional/safety risks, and cognitive dysfunction are all negative except as indicated below. These results, as well as other patient data from the [address] form, are documented in Flowsheets linked to this Encounter. Allergies   Allergen Reactions    Naproxen Other (See Comments)     Welts and nerve pain       Prior to Visit Medications    Medication Sig Taking?  Authorizing Provider   levothyroxine (EUTHYROX) 125 MCG tablet TAKE 1 TABLET BY MOUTH ONCE DAILY Yes Gregorio Ramirez MD   simvastatin (ZOCOR) 80 MG tablet TAKE ONE TABLET BY MOUTH ONCE DAILY AT NIGHT Yes Gregorio Ramirez MD   atenolol (TENORMIN) 50 MG tablet TAKE 1 TABLET BY MOUTH ONCE DAILY/ Appointment needed when refill is finished Yes Gregroio Ramirez MD   warfarin (COUMADIN) 5 MG tablet TAKE 1 TABLET BY MOUTH ONCE DAILY AND  1  &  1/2  (ONE  AND  ONE-HALF)  TABLETS  ON  WEDNESDAY Yes Mia Treviño MD   vitamin D (CHOLECALCIFEROL) 1000 UNIT TABS tablet Take 2 tablets by mouth daily Yes Gregorio Ramirez MD   aspirin EC 81 MG EC tablet Take 1 tablet by mouth daily Yes Gregorio Ramirez MD       Past Medical History:   Diagnosis Date    [facility] ([facility]     Cancer ([facility]     Basal cell carcinoma of forehead    Epididymitis     Grave's disease     Hypertension     Irregular heart rate     Polio        Past Surgical History:   Procedure Laterality Date    ABDOMEN SURGERY      ANUS SURGERY  14    EVALUATION UNDER ANESTHESIA, FISTULA PLUG    BRONCHOSCOPY      COLONOSCOPY      repeat x10 yrs    EYE SURGERY      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 CareTeam (Including outside providers/suppliers regularly involved in providing care):   Patient Care Team:  Patsy Ngo MD as PCP - General (Internal Medicine)  Patsy Ngo MD as PCP - [facility]  Rufino Boyd MD as Surgeon (General Surgery)  Thierry Esteban MD as Consulting Physician (Cardiology)    Wt Readings from Last 3 Encounters:   11/26/19 230 lb (104.3 kg)   11/19/19 230 lb (104.3 kg)   07/19/19 243 lb (110.2 kg)      Patient-Reported Vitals 11/4/2020   Patient-Reported Weight 230 lbs   Patient-Reported Height 5 11   Patient-Reported Systolic (No Data)   Patient-Reported Diastolic (No Data)   Patient-Reported Pulse (No Data)   Patient-Reported SpO2 (No Data)      There is no height or weight on file to calculate BMI. Based upon direct observation of the patient, evaluation of cognition reveals recent and remote memory intact. Patient's complete Health Risk Assessment and screening values have been reviewed and are found in Flowsheets. The following problems were reviewed today and where indicated follow up appointments were made and/or referrals ordered. Positive Risk Factor Screenings with Interventions:     General Health and ACP:  General  In general, how would you say your health is?: Good  In the past 7 days, have you experienced any of the following?  New or Increased Pain, New or Increased Fatigue, Loneliness, Social Isolation, Stress or Anger?: (!) Social Isolation  Do you get the social and emotional support that you need?: (!) No  Do you have a Living Will?: (!) No  Advance Directives     Power of  Living Will ACP-Advance Directive ACP-Power of     Not on File Coral hector on 05/15/17 Filed [address] [facility] Risk Interventions:  · Social isolation: patient declines any further intervention for this issue    Health Habits/Nutrition:  Health Habits/Nutrition  Do you exercise for at least 20 minutes 2-3 times per week?: (!) No  Have you lost any weight without trying in the past 3 months?: No  Do you eat fewer than 2 meals per day?: No  Have you seen a dentist within the past year?: Yes     Health Habits/Nutrition Interventions:  · Inadequate physical activity:  patient is not ready to increase his/her physical activity level at this time    Hearing/Vision:  No exam data present  Hearing/Vision  Do you or your family notice any trouble with your hearing?: (!) Yes  Do you have difficulty driving, watching TV, or doing any of your daily activities because of your eyesight?: No  Have you had an eye exam within the past year?: (!) No  Hearing/Vision Interventions:  · Vision concerns:  patient encouraged to make appointment with his/her eye specialist    Safety:  Safety  Do you have working smoke detectors?: Yes  Have all throw rugs been removed or fastened?: (does not have any)  Do you have non-slip mats or surfaces in all bathtubs/showers?: (!) No  Do all of your stairways have a railing or banister?: [place])  Are your doorways, halls and stairs free of clutter?: Yes  Do you always fasten your seatbelt when you are in a car?: Yes  Safety Interventions:  · Home safety tips provided    Personalized Preventive Plan   Current Health Maintenance Status  Immunization History   Administered Date(s) Administered    Influenza Virus Vaccine 10/23/2013    Pneumococcal Conjugate 13-valent ([id number]) 07/05/2016    Pneumococcal Polysaccharide ([id number]) 10/24/2012        Health Maintenance   Topic Date Due    AAA screen  1945    DTaP/Tdap/Td vaccine (1 - Tdap) 06/07/1964    Shingles Vaccine (1 of 2) 06/07/1995    Annual Wellness Visit (AWV)  07/19/2019    A1C test (Diabetic or Prediabetic)  06/19/2020    Lipid screen  06/19/2020    Colon cancer screen colonoscopy 08/06/2024    Flu vaccine  Completed    Pneumococcal 65+ years Vaccine  Completed    Hepatitis C screen  Completed    Hepatitis A vaccine  Aged Out    Hepatitis B vaccine  Aged Out    Hib vaccine  Aged Out    Meningococcal (ACWY) vaccine  Aged Out     Recommendations for Discourse Due: see orders and patient instructions/AVS.  . Recommended screening schedule for the next 5-10 years is provided to the patient in written form: see Patient Instructions/AVS.    There are no diagnoses linked to this encounter. Maddy Ramirez is a 76 y.o. male being evaluated by a Virtual Visit (phone) encounter to address concerns as mentioned above. A caregiver was present when appropriate. Due to this being a TeleHealth encounter (During JOTBR-15 public health emergency), evaluation of the following organ systems was limited: Vitals/Constitutional/EENT/Resp/CV/GI//MS/Neuro/Skin/Heme-Lymph-Imm. Pursuant to the emergency declaration under the [address] [place] [address], [address] [place] [address] authority and the [employer] and [employer] Act, this Virtual Visit was conducted with patient's (and/or legal guardian's) consent, to reduce the patient's risk of exposure to COVID-19 and provide necessary medical care. The patient (and/or legal guardian) has also been advised to contact this office for worsening conditions or problems, and seek emergency medical treatment and/or call 911 if deemed necessary. Patient identification was verified at the start of the visit: Yes    Services were provided through phone to substitute for in-person clinic visit. Patient and provider were located at their individual homes. --Alex Rodriguez MD on 11/4/2020 at 10:24 AM    An electronic signature was used to authenticate this note.

## 2020-11-04 NOTE — PROGRESS NOTES
1  &  1/2  (ONE  AND  ONE-HALF)  TABLETS  ON  WEDNESDAY Yes Luis Henry MD   vitamin D (CHOLECALCIFEROL) 1000 UNIT TABS tablet Take 2 tablets by mouth daily Yes Sudeep Ayala MD   aspirin EC 81 MG EC tablet Take 1 tablet by mouth daily Yes Sudeep Ayala MD   simvastatin (ZOCOR) 80 MG tablet TAKE ONE TABLET BY MOUTH ONCE DAILY AT NIGHT. NEED TO MAKE APPOINTMENT FOR MORE REFILLS  Sudeep Ayala MD       Social History     Tobacco Use    Smoking status: Former Smoker     Packs/day: 0.00     Years: 10.00     Pack years: 0.00     Last attempt to quit: 1970     Years since quittin.2    Smokeless tobacco: Never Used    Tobacco comment: smoked 50 years ago   Substance Use Topics    Alcohol use: Yes     Alcohol/week: 1.0 standard drinks     Types: 1 Cans of beer per week     Frequency: 2-4 times a month     Drinks per session: 1 or 2     Comment: beer or two every evening    Drug use: No        Past Medical History:   Diagnosis Date    Atrial fibrillation (HCC)     [facility]     Basal cell carcinoma of forehead    Epididymitis     Grave's disease     Hypertension     Irregular heart rate     Polio        PHYSICAL EXAMINATION:  There were no vitals filed for this visit. Patient did not sound to be in distress.  Was alert and oriented x 4  No rapid respirations or difficulty breathing appreciated over audio         Psychiatric:       [x] Normal Affect [x] No Hallucinations        [] Abnormal-         Assessment and Plan:      Thyroid disease  Recheck and consider rereferral to endo if out of range     Eye disease  Needs to f/u w ophthalmology asap as not there for a long time    Hypertension  Not checking at home- given instructions to check and call if not below 130/80    Atrial fibrillation ([facility]  Needs reeval per cardiology-referral made     Post-polio muscle weakness  Severe- really  Nothing more to be done at this time    Hyperglycemia  Check labs     Depression  Offered pt meds but refused-offered psych eval but also refused as says unable to afford- may try to see therapist but also may be too expensive and limited by inability to get out of the house     Graves' eye disease  ? ? Worsening vision-refer to eye md for reeval               No follow-ups on file. Luba Lange is a 76 y.o. male being evaluated by a Virtual Visit (audio visit) encounter to address concerns as mentioned above. A caregiver was present when appropriate. Due to this being a TeleHealth encounter (During YWTSI-31 public health emergency), evaluation of the following organ systems was limited: Vitals/Constitutional/EENT/Resp/CV/GI//MS/Neuro/Skin/Heme-Lymph-Imm. Pursuant to the emergency declaration under the [address] [place] [address] authority and the [employer] and [employer] Act, this Virtual Visit was conducted with patient's (and/or legal guardian's) consent, to reduce the patient's risk of exposure to COVID-19 and provide necessary medical care. The patient (and/or legal guardian) has also been advised to contact this office for worsening conditions or problems, and seek emergency medical treatment and/or call 911 if deemed necessary. Patient identification was verified at the start of the visit: yes    Total time spent on this encounter: 25 mins    Services were provided through a audio synchronous discussion virtually to substitute for in-person clinic visit. Patient and provider were located at their individual homes. --Kushal Billings MD on 11/5/2020 at 5:48 PM    An electronic signature was used to authenticate this note.

## 2020-11-05 PROBLEM — F32.A DEPRESSION: Status: ACTIVE | Noted: 2020-11-05

## 2020-11-05 PROBLEM — Z51.89 VISIT FOR WOUND CHECK: Status: RESOLVED | Noted: 2019-12-09 | Resolved: 2020-11-05

## 2020-11-05 PROBLEM — E05.00 GRAVES' EYE DISEASE: Status: ACTIVE | Noted: 2020-11-05

## 2020-11-05 PROBLEM — Z48.02 VISIT FOR SUTURE REMOVAL: Status: RESOLVED | Noted: 2019-11-26 | Resolved: 2020-11-05

## 2020-11-05 RX ORDER — SIMVASTATIN 80 MG
TABLET ORAL
Qty: 90 TABLET | Refills: 3 | Status: SHIPPED | OUTPATIENT
Start: 2020-11-05 | End: 2022-03-11

## 2020-11-05 RX ORDER — LEVOTHYROXINE SODIUM 0.12 MG/1
TABLET ORAL
Qty: 90 TABLET | Refills: 3 | Status: SHIPPED | OUTPATIENT
Start: 2020-11-05 | End: 2021-11-29 | Stop reason: SDUPTHER

## 2020-11-05 ASSESSMENT — ENCOUNTER SYMPTOMS
RESPIRATORY NEGATIVE: 1
ABDOMINAL PAIN: 0
CHEST TIGHTNESS: 0
SHORTNESS OF BREATH: 0
COUGH: 0

## 2020-11-05 NOTE — ASSESSMENT & PLAN NOTE
Offered pt meds but refused-offered psych eval but also refused as says unable to afford- may try to see therapist but also may be too expensive and limited by inability to get out of the house

## 2020-12-01 ENCOUNTER — ANTI-COAG VISIT (OUTPATIENT)
Dept: PHARMACY | Age: 75
End: 2020-12-01
Payer: MEDICARE

## 2020-12-01 LAB — INTERNATIONAL NORMALIZATION RATIO, POC: 2.6

## 2020-12-01 PROCEDURE — 85610 PROTHROMBIN TIME: CPT

## 2020-12-01 PROCEDURE — 99212 OFFICE O/P EST SF 10 MIN: CPT

## 2020-12-01 NOTE — PROGRESS NOTES
ANTICOAGULATION SERVICE    Deyanira Jack is a 76 y.o. male with PMHx significant for A-fib, HTN, HLD who presents to drive thru clinic 20/0/2735 for anticoagulation monitoring and adjustment.     Anticoagulation Indication(s):  Afib    Referring Physician:  Dr. Helen Stovall  Goal INR Range:  1.8-2.2, but 1.5-2.5 acceptable as patient refuses frequent INR monitoring  Duration of Anticoagulation Therapy:  Indefinite  Time of day dose taken:  PM  Product patient has at home:  warfarin 5 mg (peach)    [id number] Score for Atrial Fibrillation Stroke Risk   Risk   Factors  Component Value   C CHF No 0   H HTN Yes 1   A2 Age >= 76 Yes,  (69 y.o.) 2   D DM No 0   S2 Prior Stroke/TIA No 0   V Vascular Disease No 0   A Age 74-69 No,  (69 y.o.) 0   Sc Sex male 0    [id number]  Score  3   Score last updated 1/9/20 9:42 AM    INR Summary                            Warfarin regimen (mg)  Date INR   A/P    Sun Mon Tue Wed Thu Fri Sat Mg/wk  12/1 2.6 Above goal, continue  5 5 5 5 5 5 5 35  10/13 2.3 At goal, no change  5 5 5 5 5 5 5 35  9/1 2.5 Above goal, continue  5 5 5 5 5 5 5 35  7/21 2.2 At goal, no change  5 5 5 5 5 5 5 35  5/12 2.6 Above goal, decrease  5 5 5 5 5 5 5 35  2/19 2.5 At goal, no change  5 5 5 5 5 7.5 5 37.5  1/9 2.0 At goal, no change  5 5 5 5 5 7.5 5 37.5  10/9 2.7 Above goal, reduce x 1 5 5 5 5/7.5 5 5 5 37.5  7/31 2.2 At goal, no change  5 5 5 7.5 5 5 5 37.5  6/19 1.9 At goal, no change  5 5 5 7.5 5 5 5 37.5  5/8 2.0 At goal, no change  5 5 5 7.5 5 5 5 37.5  4/3 1.7 At goal, no change  5 5 5 7.5 5 5 5 37.5  2/27 2.4 At goal, no change  5 5 5 7.5 5 5 5 37.5   1/25 2.7 Above goal, continue  5 5 5 7.5 5 5 5 37.5   12/19 1.6 At goal (off warfarin)  5 5 5 7.5 5 5 5 37.5  11/14 2.4 At goal, no change  5 5 5 7.5 5 5 5 37.5  10/3 1.5 At goal, no change  5 5 5 7.5 5 5 5 37.5  9/5 2.4 At goal, no change  5 5 5 7.5 5 5 5 37.5  8/22 1.4 Below goal, increase  5 5 5 7.5 5 5 5 37.5  7/26 1.9 At goal, no change  5 5 5 5 5 5 5 35  7/2 1.3 Below goal, increase  5 5 5 5 5 5 5 35  6/20 1.2 Below goal, increase  2.5 5 2.5 5 2.5 5 5 27.5   6/7 1.2 Below goal, increase  2.5 5 2.5 2.5 5 2.5 2.5 22.5  6/2 --- Resume warfarin  2.5 2.5 2.5 2.5 2.5 2.5 2.5 17.5  Mar-May  Patient self-d/c'd warfarin  2/28 2.5 At goal, no change  5 7.5 5 5 5 5 5 37.5  1/17 2.1 At goal, no change  5 7.5 5 5 5 5 5 37.5  12/13 2.4 At goal, no change  5 7.5 5 5 5 5 5 37.5  11/15 2.2 At goal, no change  5 7.5 5 5 5 5 5 37.5  10/18 2.8 At goal, no change  5 7.5 5 5 5 5 5 37.5      Patient History:  Recent hospitalizations/HC visits 11/19 [facility] surgery for basal cell carcinoma of forehead    Recent medication changes None reported today   Medications taken regularly that may interact with warfarin or alter INR ASA 81 mg, levothyroxine   Warfarin dose taken as prescribed -Yes  -H/o noncompliance with warfarin dosing instructions  -Does not use pillbox   Signs/symptoms of bleeding None reported today  H/o anal fistula which bleeds off and on   Vitamin K intake -Normally has ~1-2 serving of green, leafy vegetables per week (usually broccoli once per week), has iceburg salad 3-4x per week  -12/1: no greens lately   -Drinks 1 glass of V8 juice occassionally   Recent vomiting/diarrhea/fever, changes in weight or activity level Patient states he is very tired all of the time and has to stop [facility] between the parking lot and clinic office because he runs out of energy. This is an ongoing problem. Tobacco or alcohol use -Patient reports quitting smoking 47 years ago  -Patient reports having 1-2 beers or shot of whiskey per day   Upcoming surgeries or procedures -Patient declined to consider Watchman procedure as of now  -Patient decided against DCCV due to need for weekly INR checks     Assessment/Plan:  Patient's INR was supratherapeutic today (2.6). He admits that he has not been eating his usual vit k intake and agreed to resume 1-2 servings per week. Since INR goal range is narrow, and patient refuses to come to clinic more than once monthly due to difficulty walking and cost burden, will consider INR of 1.5-2.5 acceptable. Patient is often noncompliant with clinic visits and warfarin dosing instructions. He self-doses warfarin and often holds it without being instructed to do so by a healthcare provider. He denies any recent warfarin dosing errors, medication or diet changes, or bleeding. Patient was instructed to continue warfarin 5 mg daily and resume normal vit k intake. Repeat INR in 7 weeks (patient refuses to come any sooner due to [place] pandemic). Patient was instructed to maintain consistent vitamin K intake and call with any bleeding, medication changes, or fever/vomiting/diarrhea. Of note, patient has difficulty walking due to having polio as a child. It is becoming increasingly difficult for him to come to clinic for INR monitoring. Have discussed alternative options with patient on multiple occasions. He refuses switch to DOAC due to high cost.  Also suggested that patient look into home INR monitoring devices and provided information, but he has not done so. Also discussed possibility of home care visits and suggested that patient discuss with PCP, which he has not done. Patient understands dosing directions and information discussed. Dosing schedule and follow up appointment given to patient. Progress note routed to referring physician's office. Patient acknowledges working in consult agreement with pharmacist as referred by his/her physician. Next Clinic Appointment:  1/19    Please call Kiah at [phone] with any questions. Thanks!   Siri Aleman, PharmD, [facility]  Medication Management Clinic   Ora [address]ley [address] Ph: [phone]  Isaac Hennessy Ph: [phone]  12/1/2020 9:46 AM    CLINICAL PHARMACY CONSULT: MED RECONCILIATION/REVIEW ADDENDUM    For Pharmacy Admin Tracking Only    PHSO: Yes  Total # of Interventions Recommended: 0  - Maintenance Safety Lab Monitoring #: 1  Total Interventions Accepted: 0  Time Spent (min): 15

## 2020-12-28 ENCOUNTER — TELEPHONE (OUTPATIENT)
Dept: PHARMACY | Age: 75
End: 2020-12-28

## 2020-12-28 NOTE — TELEPHONE ENCOUNTER
Patient called to inform [facility] that he is planning to have a single tooth extraction in ~1 week or so. He states his dentist told him there would be some bleeding and that he should hold warfarin. Patient asked if he could hold warfarin x 1 week. Informed patient that a single tooth extraction is a minor procedure with low bleeding risk. Normally would not recommend holding warfarin at all, but patient has held warfarin in the past for dental work and prefers to do so again. Instructed patient to hold warfarin for maximum of 2-3 days, then resume after procedure. Julio Cesar Vail.  Tony Duverney, FernandoD, [facility]  [facility]  Ph: [phone]  12/28/2020 12:45 PM

## 2021-01-19 ENCOUNTER — ANTI-COAG VISIT (OUTPATIENT)
Dept: PHARMACY | Age: 76
End: 2021-01-19
Payer: MEDICARE

## 2021-01-19 DIAGNOSIS — I48.91 ATRIAL FIBRILLATION, UNSPECIFIED TYPE (HCC): ICD-10-CM

## 2021-01-19 LAB — INTERNATIONAL NORMALIZATION RATIO, POC: 1.8

## 2021-01-19 PROCEDURE — 99211 OFF/OP EST MAY X REQ PHY/QHP: CPT

## 2021-01-19 PROCEDURE — 85610 PROTHROMBIN TIME: CPT

## 2021-01-19 NOTE — PROGRESS NOTES
ANTICOAGULATION SERVICE    Harriet Trejo is a 76 y.o. male with PMHx significant for A-fib, HTN, HLD who presents to [facility] thru clinic 3/16/8169 for anticoagulation monitoring and adjustment.     Anticoagulation Indication(s):  Afib    Referring Physician:  Dr. Bee Thomas  Goal INR Range:  1.8-2.2, but 1.5-2.5 acceptable as patient refuses frequent INR monitoring  Duration of Anticoagulation Therapy:  Indefinite  Time of day dose taken:  PM  Product patient has at home:  warfarin 5 mg (peach)    [id number] Score for Atrial Fibrillation Stroke Risk   Risk   Factors  Component Value   C CHF No 0   H HTN Yes 1   A2 Age >= 76 Yes,  (69 y.o.) 2   D DM No 0   S2 Prior Stroke/TIA No 0   V Vascular Disease No 0   A Age 74-69 No,  (69 y.o.) 0   Sc Sex male 0    [id number]  Score  3   Score last updated 1/9/20 9:42 AM    INR Summary                            Warfarin regimen (mg)  Date INR   A/P    Sun Mon Tue Wed Thu Fri Sat Mg/wk  1/19 1.8 At goal, no change  5 5 5 5 5 5 5 35  12/1 2.6 Above goal, continue  5 5 5 5 5 5 5 35  10/13 2.3 At goal, no change  5 5 5 5 5 5 5 35  9/1 2.5 Above goal, continue  5 5 5 5 5 5 5 35  7/21 2.2 At goal, no change  5 5 5 5 5 5 5 35  5/12 2.6 Above goal, decrease  5 5 5 5 5 5 5 35  2/19 2.5 At goal, no change  5 5 5 5 5 7.5 5 37.5  1/9 2.0 At goal, no change  5 5 5 5 5 7.5 5 37.5  10/9 2.7 Above goal, reduce x 1 5 5 5 5/7.5 5 5 5 37.5  7/31 2.2 At goal, no change  5 5 5 7.5 5 5 5 37.5  6/19 1.9 At goal, no change  5 5 5 7.5 5 5 5 37.5  5/8 2.0 At goal, no change  5 5 5 7.5 5 5 5 37.5  4/3 1.7 At goal, no change  5 5 5 7.5 5 5 5 37.5  2/27 2.4 At goal, no change  5 5 5 7.5 5 5 5 37.5   1/25 2.7 Above goal, continue  5 5 5 7.5 5 5 5 37.5   12/19 1.6 At goal (off warfarin)  5 5 5 7.5 5 5 5 37.5  11/14 2.4 At goal, no change  5 5 5 7.5 5 5 5 37.5  10/3 1.5 At goal, no change  5 5 5 7.5 5 5 5 37.5  9/5 2.4 At goal, no change  5 5 5 7.5 5 5 5 37.5  8/22 1.4 Below goal, increase  5 5 5 7.5 5 5 5 37.5  7/26 1.9 At goal, no change  5 5 5 5 5 5 5 35  7/2 1.3 Below goal, increase  5 5 5 5 5 5 5 35  6/20 1.2 Below goal, increase  2.5 5 2.5 5 2.5 5 5 27.5   6/7 1.2 Below goal, increase  2.5 5 2.5 2.5 5 2.5 2.5 22.5  6/2 --- Resume warfarin  2.5 2.5 2.5 2.5 2.5 2.5 2.5 17.5  Mar-May  Patient self-d/c'd warfarin  2/28 2.5 At goal, no change  5 7.5 5 5 5 5 5 37.5  1/17 2.1 At goal, no change  5 7.5 5 5 5 5 5 37.5  12/13 2.4 At goal, no change  5 7.5 5 5 5 5 5 37.5  11/15 2.2 At goal, no change  5 7.5 5 5 5 5 5 37.5  10/18 2.8 At goal, no change  5 7.5 5 5 5 5 5 37.5      Patient History:  Recent hospitalizations/HC visits 11/19 MOHS surgery for basal cell carcinoma of forehead    Recent medication changes None reported today   Medications taken regularly that may interact with warfarin or alter INR ASA 81 mg, levothyroxine   Warfarin dose taken as prescribed -Held warfarin 1/4-1/10 for single tooth extraction, despite recommendation to only hold max 2-3 days for low bleeding risk procedure. -H/o noncompliance with warfarin dosing instructions  -Does not use pillbox   Signs/symptoms of bleeding None reported  H/o anal fistula which bleeds off and on   Vitamin K intake -Normally has ~1-2 serving of green, leafy vegetables per week (usually broccoli once per week), has iceburg salad 3-4x per week  -12/1: no greens lately   -Drinks 1 glass of V8 juice occassionally   Recent vomiting/diarrhea/fever, changes in weight or activity level Patient states he is very tired all of the time and has to stop prison between the parking lot and clinic office because he runs out of energy. This is an ongoing problem.    Tobacco or alcohol use -Patient reports quitting smoking 47 years ago  -Patient reports having 1-2 beers or shot of whiskey per day   Upcoming surgeries or procedures -Patient declined to consider Watchman procedure as of now  -Patient decided against DCCV due to need for weekly INR checks Assessment/Plan:  Patient's INR was therapeutic today (1.8). INR likely on lower end of goal due to held doses for tooth extraction. He was instructed to hold just 2-3 doses, but instead he held warfarin for a full week and resumed just over a week ago. Since INR goal range is narrow, and patient refuses to come to clinic more than once monthly due to difficulty walking and cost burden, will consider INR of 1.5-2.5 acceptable. Patient is often noncompliant with clinic visits and warfarin dosing instructions. He self-doses warfarin and often holds it without being instructed to do so by a healthcare provider. He denies any recent medication or diet changes, or bleeding. Patient was instructed to continue warfarin 5 mg daily and resume normal vit k intake. Repeat INR in 6 weeks (patient refuses to come any sooner due to [place] pandemic). Patient was instructed to maintain consistent vitamin K intake and call with any bleeding, medication changes, or fever/vomiting/diarrhea. Of note, patient has difficulty walking due to having polio as a child. It is becoming increasingly difficult for him to come to clinic for INR monitoring. Have discussed alternative options with patient on multiple occasions. He refuses switch to DOAC due to high cost.  Also suggested that patient look into home INR monitoring devices and provided information, but he has not done so. Also discussed possibility of home care visits and suggested that patient discuss with PCP, which he has not done. Patient understands dosing directions and information discussed. Dosing schedule and follow up appointment given to patient. Progress note routed to referring physician's office. Patient acknowledges working in consult agreement with pharmacist as referred by his/her physician. Next Clinic Appointment:  3/2    Please call Kiah at [phone] with any questions. Thanks!   Zachary Lawrence, PharmD, [facility]  [facility]   [address] Ph: [phone]  Milton Santos Ph: [phone]  1/19/2021 9:28 AM    CLINICAL PHARMACY CONSULT: MED RECONCILIATION/REVIEW ADDENDUM    For Pharmacy Admin Tracking Only    PHSO: Yes  Total # of Interventions Recommended: 0  - Maintenance Safety Lab Monitoring #: 1  Total Interventions Accepted: 0  Time Spent (min): 15

## 2021-02-10 ENCOUNTER — TELEPHONE (OUTPATIENT)
Dept: INTERNAL MEDICINE CLINIC | Age: 76
End: 2021-02-10

## 2021-02-10 NOTE — TELEPHONE ENCOUNTER
Spoke with patient concerns with feeling a little weak , thought it may be his thyroid. advised him results  from 11/2020 were ok per DR. GALLAGHER to call back if he wanted to do vv .

## 2021-02-11 ENCOUNTER — OFFICE VISIT (OUTPATIENT)
Dept: INTERNAL MEDICINE CLINIC | Age: 76
End: 2021-02-11
Payer: MEDICARE

## 2021-02-11 VITALS
HEIGHT: 71 IN | TEMPERATURE: 97.1 F | DIASTOLIC BLOOD PRESSURE: 80 MMHG | SYSTOLIC BLOOD PRESSURE: 124 MMHG | WEIGHT: 245 LBS | BODY MASS INDEX: 34.3 KG/M2

## 2021-02-11 DIAGNOSIS — E07.9 THYROID DISEASE: ICD-10-CM

## 2021-02-11 DIAGNOSIS — I10 ESSENTIAL HYPERTENSION: ICD-10-CM

## 2021-02-11 DIAGNOSIS — K60.3 FISTULA-IN-ANO: ICD-10-CM

## 2021-02-11 DIAGNOSIS — K62.5 RECTAL BLEEDING: ICD-10-CM

## 2021-02-11 DIAGNOSIS — R73.9 HYPERGLYCEMIA: ICD-10-CM

## 2021-02-11 DIAGNOSIS — I48.91 ATRIAL FIBRILLATION, UNSPECIFIED TYPE (HCC): ICD-10-CM

## 2021-02-11 DIAGNOSIS — K59.01 SLOW TRANSIT CONSTIPATION: ICD-10-CM

## 2021-02-11 DIAGNOSIS — E55.9 VITAMIN D DEFICIENCY: ICD-10-CM

## 2021-02-11 DIAGNOSIS — M62.81 POST-POLIO MUSCLE WEAKNESS: Primary | ICD-10-CM

## 2021-02-11 DIAGNOSIS — Z00.00 ROUTINE GENERAL MEDICAL EXAMINATION AT A HEALTH CARE FACILITY: ICD-10-CM

## 2021-02-11 DIAGNOSIS — E78.5 HYPERLIPIDEMIA, UNSPECIFIED HYPERLIPIDEMIA TYPE: ICD-10-CM

## 2021-02-11 DIAGNOSIS — B91 POST-POLIO MUSCLE WEAKNESS: Primary | ICD-10-CM

## 2021-02-11 LAB
A/G RATIO: 1.3 (ref 1.1–2.2)
ALBUMIN SERPL-MCNC: 4 G/DL (ref 3.4–5)
ALP BLD-CCNC: 95 U/L (ref 40–129)
ALT SERPL-CCNC: 28 U/L (ref 10–40)
ANION GAP SERPL CALCULATED.3IONS-SCNC: 13 MMOL/L (ref 3–16)
AST SERPL-CCNC: 29 U/L (ref 15–37)
BASOPHILS ABSOLUTE: 0.1 K/UL (ref 0–0.2)
BASOPHILS RELATIVE PERCENT: 0.9 %
BILIRUB SERPL-MCNC: 0.5 MG/DL (ref 0–1)
BUN BLDV-MCNC: 14 MG/DL (ref 7–20)
CALCIUM SERPL-MCNC: 9.5 MG/DL (ref 8.3–10.6)
CHLORIDE BLD-SCNC: 98 MMOL/L (ref 99–110)
CHOLESTEROL, FASTING: 161 MG/DL (ref 0–199)
CO2: 24 MMOL/L (ref 21–32)
CREAT SERPL-MCNC: 0.6 MG/DL (ref 0.8–1.3)
EOSINOPHILS ABSOLUTE: 0.1 K/UL (ref 0–0.6)
EOSINOPHILS RELATIVE PERCENT: 1 %
FOLATE: >20 NG/ML (ref 4.78–24.2)
GFR AFRICAN AMERICAN: >60
GFR NON-AFRICAN AMERICAN: >60
GLOBULIN: 3.1 G/DL
GLUCOSE BLD-MCNC: 105 MG/DL (ref 70–99)
HCT VFR BLD CALC: 46.9 % (ref 40.5–52.5)
HDLC SERPL-MCNC: 40 MG/DL (ref 40–60)
HEMOGLOBIN: 15.6 G/DL (ref 13.5–17.5)
IRON SATURATION: 26 % (ref 20–50)
IRON: 72 UG/DL (ref 59–158)
LDL CHOLESTEROL CALCULATED: 95 MG/DL
LYMPHOCYTES ABSOLUTE: 1.3 K/UL (ref 1–5.1)
LYMPHOCYTES RELATIVE PERCENT: 14.7 %
MCH RBC QN AUTO: 30.8 PG (ref 26–34)
MCHC RBC AUTO-ENTMCNC: 33.2 G/DL (ref 31–36)
MCV RBC AUTO: 92.8 FL (ref 80–100)
MONOCYTES ABSOLUTE: 1.1 K/UL (ref 0–1.3)
MONOCYTES RELATIVE PERCENT: 11.8 %
NEUTROPHILS ABSOLUTE: 6.4 K/UL (ref 1.7–7.7)
NEUTROPHILS RELATIVE PERCENT: 71.6 %
PDW BLD-RTO: 13.3 % (ref 12.4–15.4)
PLATELET # BLD: 313 K/UL (ref 135–450)
PMV BLD AUTO: 9.6 FL (ref 5–10.5)
POTASSIUM SERPL-SCNC: 4.6 MMOL/L (ref 3.5–5.1)
RBC # BLD: 5.05 M/UL (ref 4.2–5.9)
SODIUM BLD-SCNC: 135 MMOL/L (ref 136–145)
T4 FREE: 1.4 NG/DL (ref 0.9–1.8)
TOTAL IRON BINDING CAPACITY: 272 UG/DL (ref 260–445)
TOTAL PROTEIN: 7.1 G/DL (ref 6.4–8.2)
TRIGLYCERIDE, FASTING: 129 MG/DL (ref 0–150)
TSH SERPL DL<=0.05 MIU/L-ACNC: 3.78 UIU/ML (ref 0.27–4.2)
VITAMIN B-12: 592 PG/ML (ref 211–911)
VITAMIN D 25-HYDROXY: 52.9 NG/ML
VLDLC SERPL CALC-MCNC: 26 MG/DL
WBC # BLD: 8.9 K/UL (ref 4–11)

## 2021-02-11 PROCEDURE — G0439 PPPS, SUBSEQ VISIT: HCPCS | Performed by: INTERNAL MEDICINE

## 2021-02-11 PROCEDURE — 99214 OFFICE O/P EST MOD 30 MIN: CPT | Performed by: INTERNAL MEDICINE

## 2021-02-11 ASSESSMENT — PATIENT HEALTH QUESTIONNAIRE - PHQ9
SUM OF ALL RESPONSES TO PHQ QUESTIONS 1-9: 0
1. LITTLE INTEREST OR PLEASURE IN DOING THINGS: 0
SUM OF ALL RESPONSES TO PHQ9 QUESTIONS 1 & 2: 0
SUM OF ALL RESPONSES TO PHQ QUESTIONS 1-9: 0
SUM OF ALL RESPONSES TO PHQ QUESTIONS 1-9: 0

## 2021-02-11 ASSESSMENT — ENCOUNTER SYMPTOMS
CHEST TIGHTNESS: 0
ABDOMINAL PAIN: 0
SHORTNESS OF BREATH: 0
ANAL BLEEDING: 1
RESPIRATORY NEGATIVE: 1
BLOOD IN STOOL: 1
CONSTIPATION: 1
RECTAL PAIN: 1

## 2021-02-11 NOTE — ASSESSMENT & PLAN NOTE
Needs to try ptx again-referral made and he will inquire re: cost but hopefully now more affordable w medicare

## 2021-02-11 NOTE — PROGRESS NOTES
Subjective:      Patient ID: Codi Escamilla is a 76 y.o. male. Chief Complaint   Patient presents with    Rectal Bleeding     possible fistula bleeding x 2 days     Has had bleeding from rectum past 2 days- spotting off and on before this then bleeding got a lot worse- has been constipated - heart rate is rapid and has not seen cardiology for more than 1 year- did stop the blood thinners due to the bleeding- so weak he is unable to do any exercises- has not been to physical therapy for many years but is hesitant as it was very expensive - feels his upper body is weaker also making crutch walking difficult but denies recent falls- last tsh was sl up so needs that reassessed   Review of Systems   Constitutional: Positive for fatigue. Negative for appetite change. HENT: Negative. Respiratory: Negative. Negative for chest tightness and shortness of breath. Cardiovascular: Negative for chest pain and palpitations. Gastrointestinal: Positive for anal bleeding, blood in stool, constipation and rectal pain. Negative for abdominal pain. Genitourinary: Negative. Musculoskeletal: Positive for gait problem and myalgias. Skin: Negative. Neurological: Positive for weakness. Psychiatric/Behavioral: Negative for dysphoric mood and sleep disturbance. The patient is not nervous/anxious. History reviewed. No pertinent family history.   Social History     Socioeconomic History    Marital status: Single     Spouse name: Not on file    Number of children: Not on file    Years of education: Not on file    Highest education level: Not on file   Occupational History    Not on file   Social Needs    Financial resource strain: Not hard at all    Food insecurity     Worry: Never true     Inability: Never true    Transportation needs     Medical: No     Non-medical: No   Tobacco Use    Smoking status: Former Smoker     Packs/day: 0.00     Years: 10.00     Pack years: 0.00     Quit date: 9/4/1970 Years since quittin.4    Smokeless tobacco: Never Used    Tobacco comment: smoked 50 years ago   Substance and Sexual Activity    Alcohol use: Yes     Alcohol/week: 1.0 standard drinks     Types: 1 Cans of beer per week     Frequency: 2-4 times a month     Drinks per session: 1 or 2     Comment: beer or two every evening    Drug use: No    Sexual activity: Not on file   Lifestyle    Physical activity     Days per week: Not on file     Minutes per session: Not on file    Stress: Not on file   Relationships    Social connections     Talks on phone: Not on file     Gets together: Not on file     Attends Yarsanism service: Not on file     Active member of club or organization: Not on file     Attends meetings of clubs or organizations: Not on file     Relationship status: Not on file    Intimate partner violence     Fear of current or ex partner: Not on file     Emotionally abused: Not on file     Physically abused: Not on file     Forced sexual activity: Not on file   Other Topics Concern    Not on file   Social History Narrative    Not on file         Objective:   Physical Exam  Constitutional:       General: He is not in acute distress. Appearance: He is well-developed. He is obese. HENT:      Head: Normocephalic and atraumatic. Right Ear: External ear normal.      Left Ear: External ear normal.   Eyes:      Conjunctiva/sclera: Conjunctivae normal.      Pupils: Pupils are equal, round, and reactive to light. Neck:      Musculoskeletal: Normal range of motion and neck supple. Thyroid: No thyroid mass or thyromegaly. Vascular: No carotid bruit. Cardiovascular:      Rate and Rhythm: Normal rate and regular rhythm. Pulses: Normal pulses. Heart sounds: Normal heart sounds. No murmur. No gallop. Comments: No carotid bruits noted bilaterally  Pulmonary:      Effort: Pulmonary effort is normal. No respiratory distress. Breath sounds: Normal breath sounds.  No wheezing, rhonchi or rales. Abdominal:      General: Bowel sounds are normal. There is no distension. Palpations: Abdomen is soft. There is no hepatomegaly, splenomegaly or mass. Tenderness: There is no abdominal tenderness. There is no guarding or rebound. Comments: Rectal exam no brbpr- + external hemorrhoid    Musculoskeletal: Normal range of motion. Lymphadenopathy:      Cervical: No cervical adenopathy. Upper Body:      Right upper body: No supraclavicular adenopathy. Left upper body: No supraclavicular adenopathy. Skin:     General: Skin is warm and dry. Findings: No rash. Neurological:      Mental Status: He is alert and oriented to person, place, and time. Cranial Nerves: No cranial nerve deficit. Sensory: No sensory deficit. Motor: Weakness present. Gait: Gait abnormal.      Deep Tendon Reflexes: Reflexes abnormal.   Psychiatric:         Mood and Affect: Mood normal.         Speech: Speech normal.         Behavior: Behavior normal.         Thought Content: Thought content normal.         Judgment: Judgment normal.           Assessment and Plan:      Fistula-in-ano  See colorectal surgeon now     Constipation  Add miralax     Atrial fibrillation ([facility] [address]  Needs to see Brar Slain soon     Hypertension  Controlled w current regimen- continue and monitor closely        Thyroid disease  Recheck now     Rectal bleeding  Check labs-refer to      Post-polio muscle weakness  Needs to try ptx again-referral made and he will inquire re: cost but hopefully now more affordable w medicare       Encounter Diagnoses   Name Primary?     Post-polio muscle weakness Yes    Fistula-in-ano     Slow transit constipation     Atrial fibrillation, unspecified type ([address]     Essential hypertension     Thyroid disease     Rectal bleeding     Routine general medical examination at a health care facility        Orders Placed This Encounter   Procedures    TSH without Reflex     Standing Status:   Future     Number of Occurrences:   1     Standing Expiration Date:   2022    T4, Free     Standing Status:   Future     Number of Occurrences:   1     Standing Expiration Date:   2022    CBC Auto Differential     Standing Status:   Future     Number of Occurrences:   1     Standing Expiration Date:   2022    Iron and TIBC     Standing Status:   Future     Number of Occurrences:   1     Standing Expiration Date:   2022     Order Specific Question:   Is Patient Fasting? Answer:   yes     Order Specific Question:   No of Hours? Answer:   10    Vitamin B12 & Folate     Standing Status:   Future     Number of Occurrences:   1     Standing Expiration Date:   2022   Yonatan Adamson MD, Cardiology, [facility]     Referral Priority:   Routine     Referral Type:   Eval and Treat     Referral Reason:   Specialty Services Required     Referred to Provider:   Michael Godoy MD     Requested Specialty:   Cardiology     Number of Visits Requested:   Glenroy Muñoz MD, Colorectal Surgery, [facility]     Referral Priority:   Routine     Referral Type:   Eval and Treat     Referral Reason:   Specialty Services Required     Referred to Provider:   Shen Linton MD     Requested Specialty:   Colon and Rectal Surgery     Number of Visits Requested:   [address]     Referral Priority:   Routine     Referral Type:   Eval and Treat     Referral Reason:   Specialty Services Required     Requested Specialty:   Physical Therapy     Number of Visits Requested:   1              Medicare Annual Wellness Visit  Name: Amanda Duenas Date: 2021   MRN: [id number] Sex: Male   Age: 76 y.o.  Ethnicity: Non-/Non    : 1945 Race: Singhasim Abilio Amelia is here for Rectal Bleeding (possible fistula bleeding x 2 days)    Screenings for behavioral, psychosocial and functional/safety risks, and cognitive dysfunction are all negative except as indicated below. These results, as well as other patient data from the [address] form, are documented in Flowsheets linked to this Encounter. Allergies   Allergen Reactions    Naproxen Other (See Comments)     Welts and nerve pain         Prior to Visit Medications    Medication Sig Taking? Authorizing Provider   levothyroxine (EUTHYROX) 125 MCG tablet TAKE 1 TABLET BY MOUTH ONCE DAILY Yes David Malin MD   simvastatin (ZOCOR) 80 MG tablet TAKE ONE TABLET BY MOUTH ONCE DAILY AT NIGHT Yes David Malin MD   atenolol (TENORMIN) 50 MG tablet TAKE 1 TABLET BY MOUTH ONCE DAILY/ Appointment needed when refill is finished Yes David Malin MD   warfarin (COUMADIN) 5 MG tablet TAKE 1 TABLET BY MOUTH ONCE DAILY AND  1  &  1/2  (ONE  AND  ONE-HALF)  TABLETS  ON  WEDNESDAY Yes Zainab Medina MD   vitamin D (CHOLECALCIFEROL) 1000 UNIT TABS tablet Take 2 tablets by mouth daily Yes David Malin MD   aspirin EC 81 MG EC tablet Take 1 tablet by mouth daily Yes David Malin MD         Past Medical History:   Diagnosis Date    Atrial fibrillation ([facility] [address]     Cancer ([facility] [address]     Basal cell carcinoma of forehead    Epididymitis     Grave's disease     Hypertension     Irregular heart rate     Polio        Past Surgical History:   Procedure Laterality Date    ABDOMEN SURGERY      ANUS SURGERY  9-18-14    EVALUATION UNDER ANESTHESIA, FISTULA PLUG    BRONCHOSCOPY      COLONOSCOPY      repeat x10 yrs    EYE SURGERY      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 No pertinent family history.     CareTeam (Including outside providers/suppliers regularly involved in providing care):   Patient Care Team:  [place] [id number] Eduard Ruiz MD as PCP - General (Internal Medicine)  Parth Paris MD as PCP - [facility]  Robina Christopher MD as Surgeon (General Surgery)  Nikolas Worthy MD as Consulting Physician (Cardiology)    Wt Readings from Last 3 Encounters:   02/11/21 245 lb (111.1 kg)   11/26/19 230 lb (104.3 kg)   11/19/19 230 lb (104.3 kg)     Vitals:    02/11/21 1125   BP: 124/80   Temp: 97.1 °F (36.2 °C)   Weight: 245 lb (111.1 kg)   Height: 5' 11\" (1.803 m)     Body mass index is 34.17 kg/m². Based upon direct observation of the patient, evaluation of cognition reveals recent and remote memory intact. Patient's complete Health Risk Assessment and screening values have been reviewed and are found in Flowsheets. The following problems were reviewed today and where indicated follow up appointments were made and/or referrals ordered.     Positive Risk Factor Screenings with Interventions:           Health Habits/Nutrition:     Body mass index: (!) 34.17  Health Habits/Nutrition Interventions:  · Inadequate physical activity:  patient is not ready to increase his/her physical activity level at this time       Personalized Preventive Plan   Current Health Maintenance Status  Immunization History   Administered Date(s) Administered    Influenza Virus Vaccine 10/23/2013    Pneumococcal Conjugate 13-valent ([id number]) 07/05/2016    Pneumococcal Polysaccharide ([id number]) 10/24/2012        Health Maintenance   Topic Date Due    AAA screen  1945    DTaP/Tdap/Td vaccine (1 - Tdap) 06/07/1964    Shingles Vaccine (1 of 2) 06/07/1995    Annual Wellness Visit (AWV)  07/19/2019    COVID-19 Vaccine (2 of 2 - Pfizer series) 02/20/2021    A1C test (Diabetic or Prediabetic)  11/23/2021    Lipid screen  11/23/2021    TSH testing  11/23/2021    Colon cancer screen colonoscopy  08/06/2024    Flu vaccine  Completed    Pneumococcal 65+ years Vaccine  Completed    Hepatitis C screen  Completed    Hepatitis A vaccine Aged Out    Hepatitis B vaccine  Aged Out    Hib vaccine  Aged Out    Meningococcal (ACWY) vaccine  Aged Out     Recommendations for VERTILAS Due: see orders and patient instructions/AVS.  . Recommended screening schedule for the next 5-10 years is provided to the patient in written form: see Patient Instructions/AVS.    Jane Mast was seen today for rectal bleeding. Diagnoses and all orders for this visit:    Post-polio muscle weakness  -     [facility] [place]    Fistula-in-ano  -     TSH without Reflex; Future  -     T4, Free; Future  -     CBC Auto Differential; Future  -     Iron and TIBC; Future  -     Vitamin B12 & Folate; Future    Slow transit constipation  -     TSH without Reflex; Future  -     T4, Free; Future  -     CBC Auto Differential; Future  -     Iron and TIBC; Future  -     Vitamin B12 & Folate; Future    Atrial fibrillation, unspecified type [facility])  -     Claudetta Sleeper, MD, Cardiology, [facility]  -     TSH without Reflex; Future  -     T4, Free; Future  -     CBC Auto Differential; Future  -     Iron and TIBC; Future  -     Vitamin B12 & Folate; Future    Essential hypertension  -     TSH without Reflex; Future  -     T4, Free; Future  -     CBC Auto Differential; Future  -     Iron and TIBC; Future  -     Vitamin B12 & Folate; Future    Thyroid disease  -     TSH without Reflex; Future  -     T4, Free; Future  -     CBC Auto Differential; Future  -     Iron and TIBC; Future  -     Vitamin B12 & Folate; Future    Rectal bleeding  -     Tk Schultz MD, Colorectal Surgery, [facility]  -     TSH without Reflex; Future  -     T4, Free; Future  -     CBC Auto Differential; Future  -     Iron and TIBC; Future  -     Vitamin B12 & Folate;  Future    Routine general medical examination at a health care facility

## 2021-02-11 NOTE — TELEPHONE ENCOUNTER
Made appt for today, patient called stating he would like to be seen today because he is bleeding from his fistula. Unable to do a VV visit so scheduled in person visit.

## 2021-02-12 LAB
ESTIMATED AVERAGE GLUCOSE: 122.6 MG/DL
HBA1C MFR BLD: 5.9 %

## 2021-02-23 ENCOUNTER — OFFICE VISIT (OUTPATIENT)
Dept: SURGERY | Age: 76
End: 2021-02-23
Payer: MEDICARE

## 2021-02-23 ENCOUNTER — TELEPHONE (OUTPATIENT)
Dept: PHARMACY | Age: 76
End: 2021-02-23

## 2021-02-23 ENCOUNTER — PREP FOR PROCEDURE (OUTPATIENT)
Dept: SURGERY | Age: 76
End: 2021-02-23

## 2021-02-23 VITALS
RESPIRATION RATE: 16 BRPM | BODY MASS INDEX: 33.63 KG/M2 | HEIGHT: 71 IN | DIASTOLIC BLOOD PRESSURE: 82 MMHG | WEIGHT: 240.2 LBS | TEMPERATURE: 97.9 F | HEART RATE: 94 BPM | SYSTOLIC BLOOD PRESSURE: 127 MMHG

## 2021-02-23 DIAGNOSIS — K62.5 RECTAL BLEEDING: ICD-10-CM

## 2021-02-23 DIAGNOSIS — K60.3 ANAL FISTULA: Primary | ICD-10-CM

## 2021-02-23 PROCEDURE — 99204 OFFICE O/P NEW MOD 45 MIN: CPT | Performed by: SURGERY

## 2021-02-23 RX ORDER — SODIUM CHLORIDE 0.9 % (FLUSH) 0.9 %
10 SYRINGE (ML) INJECTION PRN
Status: CANCELLED | OUTPATIENT
Start: 2021-02-23

## 2021-02-23 RX ORDER — SODIUM CHLORIDE 0.9 % (FLUSH) 0.9 %
10 SYRINGE (ML) INJECTION EVERY 12 HOURS SCHEDULED
Status: CANCELLED | OUTPATIENT
Start: 2021-02-23

## 2021-02-23 NOTE — PROGRESS NOTES
[address] [address] [address] [facility]  Dept: [phone]  Dept Fax: [phone]  Loc: [phone]    Visit Date: 2/23/2021    Nina Mullins is a 76 y.o. male who presents today for: New Patient (Rectal Bleeding, Fistula;  Dr Jessi Kline referral )      HPI:       Nina Mullins is a 76 y.o. male referred to me by Dr. Vicky Alvarez for further evaluation regarding chronic anal fistula    April García has a known history of perianal abscess requiring incision and drainage, that developed into a fistula. He underwent anal fistula plug surgery in 2014 with my partner Dr. Marc Cuenca. April García does not think that he ever healed his fistula and has had continued intermittent symptoms since then. His major issue is irritation and bleeding. Over the past couple weeks, the bleeding has actually increased. Of note he is on Coumadin for A. fib. He denies fever, chills, nausea, vomiting, food tolerance, weight loss. Last colonoscopy was in 2014 with [facility]. Patient's problem list, medications, past medical, surgical, family, and social histories were reviewed and updated in the chart as indicated today.     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 Social History:   Social History     Tobacco Use    Smoking status: Former Smoker     Packs/day: 0.00     Years: 10.00     Pack years: 0.00     Quit date: 1970     Years since quittin.5    Smokeless tobacco: Never Used    Tobacco comment: smoked 50 years ago   Substance Use Topics    Alcohol use: Yes     Alcohol/week: 1.0 standard drinks     Types: 1 Cans of beer per week     Frequency: 2-4 times a month     Drinks per session: 1 or 2     Comment: beer or two every evening      Tobacco cessation counseling provided as appropriate. REVIEW OF SYSTEMS:    Pertinent positives and negatives are mentioned in the HPI above. Otherwise, all other systems were reviewed and negative. Objective:     Physical Exam   /82 (Site: Left Upper Arm, Position: Sitting, Cuff Size: Large Adult)   Pulse 94   Temp 97.9 °F (36.6 °C) (Temporal)   Resp 16   Ht 5' 10.98\" (1.803 m)   Wt 240 lb 3.2 oz (109 kg)   BMI 33.52 kg/m²   Constitutional: Appears well-developed and well-nourished. Grooming appropriate. No gross deformities. Body mass index is 33.52 kg/m². Eyes: No scleral icterus. Conjunctiva/lids normal. Vision intact grossly. Pupils equal/symmetric, reactive bilaterally. ENT: External ears/nose without defect, scars, or masses. Hearing grossly intact. No facial deformity. Lips normal, normal dentition. Neck: No masses. Trachea midline. No crepitus. Thyroid not enlarged. Cardiovascular: Normal rate. No peripheral edema. Abdominal aorta normal size to palpation. Pulmonary/Chest: Effort normal. No respiratory distress. No wheezes. No use of accessory muscles. Musculoskeletal: Normal range of motion x all 4 extremities and head/neck, without deformity, pain, or crepitus, with normal strength and tone. Normal gait. Nails without clubbing or cyanosis. Neurological: Alert and oriented to person, place, and time. No gross deficits. Sensation intact. Skin: Skin is dry. No rashes noted. No pallor. No induration of nodules. Psychiatric: Normal mood and affect. Behavior normal. Oriented to person, place, and time. Judgment and insight reasonable. Abdominal/wound: soft, nontneder    RECTAL EXAM:    Chaperone/MA present in room during entire exam. Patient was placed in knee-chest position or left side down lateral position depending on preference. Exam table manipulated for proper visualization and lighting. Buttocks spread. Inspection reveals: Complex anal fistula with obvious external opening at base of scrotum and perineum, with palpable underlying cord, tender to palpation. Digital exam and anoscopy deferred due to acute pain    Labs reviewed: none  Radiology reviewed: none    [facility]      Assessment/Plan:     A/P:  New problem(s): Chronic anal fistula  Established problem(s): afib on coumadin  Additional workup/treatment planned: EUA, fistulotomy versus seton versus mucosal advancement flap  Risk of complications/morbidity: High    I had a long discussion with Macie Nelsonlaron today in the office. It seems likely that he never healed after his anal fistula plug surgery in 2014, but for an unknown reason he has had increasing symptoms over the past couple weeks. Regardless, he has a long complex fistula extending from the perineum likely to the anal canal.  I discussed with him the pathophysiology, etiology, work-up, treatment options regarding complex anal fistula. Discussed with him that I suggest EUA, and if the fistula is relatively superficial, then fistulotomy. If more complex, plan for drainage with seton, then will need a secondary operation such as mucosal advancement flap or LIFT procedure. I answered several questions regarding recovery, healing time, recurrence rates of various options. Clearly given the recurrent/chronic nature of this fistula, he is at higher risk for recurrence down the road.   Discussed that he will need to be off of his

## 2021-02-23 NOTE — PATIENT INSTRUCTIONS
ANAL FISTULA: Information and treatment options      A fistula is an abnormal connection between the inside of the anus or rectum and another structure, most commonly the skin around the anus. This results in continuous irritation and inflammation of the tract, which can cause recurrent infections and abscesses/boils. This can also result in pain, bleeding, and difficulty keeping clean. The cause of most fistula disease is unknown. Rarely, it can be a sign of other problems, such as Crohn's disease, or a result from previous radiation or surgical treatments in the anus or rectal region. Most anal fistulas require surgery. Antibiotics are generally not helpful, and creams and suppositories are also ineffective. There are various surgical options, depending on the course of the fistula tract. Usually the best surgical option is not determined until the actual time of surgery, in the operating room. This is determined by estimating how much of the internal and external sphincter muscles are involved in the fistula. The external sphincter muscle is under our conscious control (voluntary) and is the main muscle involved in controlling our defecation (stool control). The internal sphincter is under automatic control, and is much less critical in controlling defecation. Typically, only minimal workup (an office visit and exam) is required before proceeding with an operation, as most fistulas are best evaluated at the time of surgery. Occasionally, an MRI is ordered in the case of recurrent or complex fistula disease. Colonoscopy may be recommended if there is any suspicion for Crohn's Disease, such as chronic diarrhea, rectal bleeding, abdominal pain, unintended weight loss, or if you have a family history of Crohn's Disease or Ulcerative Colitis. If the fistula contains only internal sphincter muscle or no muscle at all, often a procedure called a fistulotomy is performed.  In this procedure, a probe is used to define the exact path, and the tissue and skin on top of the probe is opened up, and the fistula is allowed to heal from the inside out. This may take 2-4 weeks to completely heal, but results in excellent (95%) long term success. If the fistula contains various amount of the external sphincter muscle, other options may be chosen, depending on factors such as your age, gender, previous surgeries, previous sphincter tears or repairs, and ability to heal:  · Seton Placement: In this procedure, a rubber band-like object (\"seton\") is passed through the fistula tract and loosely tied to itself. This is used to prevent further infection and promote drainage. These are typically used as a bridge (for 2-4 months) to a more complex procedure, but can be left in indefinitely if needed. Setons are inert and do not interfere with bowel movements or daily activities. · LIFT (Ligation of Intersphincteric Fistula Tract): In this procedure, an incision is made between the internal and external sphincter muscles without the need to cut either one. The fistula is found, sutured closed, and cut. The skin side of the fistula is left open to heal from the inside out and to allow for drainage during the healing process. Though this procedure does require an incision into normal tissue, it minimizes the risk of damage to the external sphincter muscle, which makes the risk of incontinence very low. Long term success rates range from 60-80%. This procedure is usually preceded by a seton for 2-4 months. · Mucosal Advancement Flap: In this procedure, a small amount of your rectal tissue is freed up and stitched over the internal opening of the fistula. The skin side is left to heal from the inside out and to allow for drainage during healing. There is minimal risk of incontinence with this procedure. Long term success rates range from 60-80%. This procedure is usually preceded by a seton for 2-4 months. · Fistula Plug:  In this procedure, a small plug, made of porcine derivatives is used to fill the space inside the tract, to promote tissue ingrowth to allow it to heal. The plug will eventually dissolve. There is no risk of incontinence as no muscle is cut. Long term success rates range from 40-50%. These procedures are typically done in the operating room with sedation. All are same-day procedures and you will go home a few hours later. How can you increase your chance of successful healing:  · DO NOT smoke or use tobacco products  · If you are diabetic, keep your blood sugars well-controlled  · Avoid constipation and hard bowel movements  · Exercise daily, and maintain a healthy weight    Preparation for Surgery:  · Be sure to ask your doctor about any medications that you take on a regular basis. There may be special instructions regarding any blood thinning medications (such as aspirin, Plavix, or Coumadin), and any medications for diabetes. · Do not eat or drink anything after midnight, with the exception of sips of water with your regular medications the morning of surgery  · Please perform a fleets enema 1-2 hours before your scheduled arrival time. This can be acquired over the counter at most drug stores. We can provide one for you free of charge in the office if needed. · If you have any questions, please call the office at [phone]    Surgery Expectations:  · Please arrange for someone to drive you. You will be receiving sedation and it is illegal to drive yourself home. · Please arrive 2 hours before your scheduled surgery time. · You will go through registration, receive an IV, and meet the anesthesia provider  · You will be offered various anesthetic options, including IV sedation (\"twilight\"), local anesthetic injection, and a spinal injection (this will make you numb from the hips and downward - similar to an epidural used for childbirth). General anesthesia is offered for more complex procedures.   · Your surgeon will see you 10-15 minutes before your procedure to insure all of your questions and concerns have been addressed  · During the procedure, a flexible sigmoidoscopy will be performed - this is similar to a colonoscopy, but much shorter, to examine the inside of the rectum  · Next, retractors will be used to examine the fistula and determine which of the above options is best for you. · You will spend anywhere from 1 to 3 hours in the recovery area to insure sedation has worn off and you are safe to go home. After the procedure:  · You may have some drainage or a small amount of bleeding depending on the specifics of the procedure. · Depending on which procedure was done, you may have pain and discomfort. Please see the POST-OP Instructions on recommendations to control pain. · Keep your stools soft with plenty of fiber, water, and healthy fruits and vegetables. MiraLax and colace can be used as needed. · Warm water soaks (5-10 minutes) or gentle cleansing with a showerhead should be done twice daily and after bowel movements to keep the area clean. · Pathology/biopsy results are typically discussed at your postoperative visit. Risks and potential complications  · Fistula recurrence    · Potential need for future surgery  · Pain  · Rectal bleeding  · Difficulty with urinating (uncommon)  · Temporary changes in your ability to control stool or gas (uncommon)  · Permanent changes in your ability to control stool or gas (rare)  · Infections requiring emergency surgery and colostomy (very rare)    When should you call the office? · You have any questions or concerns. · You don't understand how to prepare for your procedure. · You have excessive bleeding, fever, chills, or inability to urinate  · You need to reschedule or have changed your mind about having the procedure.   · Dr Ana Boo office phone # is [phone]  · If you are unable to reach the office (outside of normal business hours) and you have any concerns, go to your nearest emergency room.

## 2021-02-23 NOTE — PROGRESS NOTES
[address] [address] [address] [facility]  Dept: [phone]  Dept Fax: [phone]  Loc: [phone]    Visit Date: 2021    Deangelo Rothman is a 76 y.o. male who presents today for: New Patient (Rectal Bleeding, Fistula;  Dr Tobi Woodruff referral )      HPI:       Deangelo Rothman is a 76 y.o. male ***    ***    ***    Patient's problem list, medications, past medical, surgical, family, and social histories were reviewed and updated in the chart as indicated today. 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 Social History:   Social History     Tobacco Use    Smoking status: Former Smoker     Packs/day: 0.00     Years: 10.00     Pack years: 0.00     Quit date: 1970     Years since quittin.5    Smokeless tobacco: Never Used    Tobacco comment: smoked 50 years ago   Substance Use Topics    Alcohol use: Yes     Alcohol/week: 1.0 standard drinks     Types: 1 Cans of beer per week     Frequency: 2-4 times a month     Drinks per session: 1 or 2     Comment: beer or two every evening      Tobacco cessation counseling provided as appropriate.     REVIEW OF SYSTEMS: Pertinent positives and negatives are mentioned in the HPI above. Otherwise, all other systems were reviewed and negative. Objective:     Physical Exam   /82 (Site: Left Upper Arm, Position: Sitting, Cuff Size: Large Adult)   Pulse 94   Temp 97.9 °F (36.6 °C) (Temporal)   Resp 16   Ht 5' 10.98\" (1.803 m)   Wt 240 lb 3.2 oz (109 kg)   BMI 33.52 kg/m²   Constitutional: Appears well-developed and well-nourished. Grooming appropriate. No gross deformities. Body mass index is 33.52 kg/m². Eyes: No scleral icterus. Conjunctiva/lids normal. Vision intact grossly. Pupils equal/symmetric, reactive bilaterally. ENT: External ears/nose without defect, scars, or masses. Hearing grossly intact. No facial deformity. Lips normal, normal dentition. Neck: No masses. Trachea midline. No crepitus. Thyroid not enlarged. Cardiovascular: Normal rate. No peripheral edema. Abdominal aorta normal size to palpation. Pulmonary/Chest: Effort normal. No respiratory distress. No wheezes. No use of accessory muscles. Musculoskeletal: Normal range of motion x all 4 extremities and head/neck, without deformity, pain, or crepitus, with normal strength and tone. Normal gait. Nails without clubbing or cyanosis. Neurological: Alert and oriented to person, place, and time. No gross deficits. Sensation intact. Skin: Skin is dry. No rashes noted. No pallor. No induration of nodules. Psychiatric: Normal mood and affect. Behavior normal. Oriented to person, place, and time. Judgment and insight reasonable.     Abdominal/wound: ***    ***    Labs reviewed: ***  Radiology reviewed: ***    Last colonoscopy: ***      Assessment/Plan:     A/P:  New problem(s): ***  Established problem(s): ***  Additional workup/treatment planned: ***  Risk of complications/morbidity: *** (low/moderate/high)    ***    ***    *** Continue with current medications    I provided written information in the After Visit Summary AVS Regarding: *** DISPOSITION:  ***    My findings will be relayed to consulting practitioner or PCP via Epic    Total encounter time:  *** (new pt - 15, 30, 45, 60) min, including any number of the following: review of labs, imaging, provider notes, outside hospital records; performing examination/evaluation; counseling patient and family; ordering medications/tests; placing referrals and communication with referring physicians; coordination of care, and documentation in the EHR. Note completed using dictation software, please excuse any errors.     Electronically signed by Andre Alexandra MD on 2/23/2021 at 10:00 AM

## 2021-02-23 NOTE — TELEPHONE ENCOUNTER
I made no comment on holding it indefinitely, only to hold 5 days before surgery.  But he is clearly bleeding from the area, so I think surgery ASAP is in his best interest.

## 2021-02-23 NOTE — TELEPHONE ENCOUNTER
Patient called to cancel his INR appointment next week. States he has a bleeding anal fistula and will be needing colorectal surgery (EUA with possible fistulotomy), which has not been scheduled yet. He stated that he was instructed to hold his warfarin from now until surgery. The patient is to call Dr. Darian Ortiz once he is ready to schedule surgery, per colorectal office note, though patient did not indicate when it would be scheduled. I agree with the need to hold warfarin for 5 days leading up to surgery, however I wanted to confirm that it would be safe to hold currently knowing that his surgery is not yet scheduled. Please advise.      Thanks,  Kim Cates, PharmD  PGY1 Pharmacy Resident   [facility] Ph: [phone]  2/23/2021 2:45 PM

## 2021-02-23 NOTE — LETTER
P - [facility] [address] [place] [address] Road [phone]  f [phone]    Rosie Guadarrama MD                        SURGERY ORDER   -- Time of order -- 21    10:48 AM    [facility]:   [facility] # _________________                                                                                    Scheduled By:____________                  Surgery Date & Time: 21 @ 12:15                                      Pt arrival: 10:15                                                                                      Patient Name:  Prosper Webb     :  1945     PCP:  Yoana Crawley MD      Home Ph:    [phone] (home)                                                     PROCEDURE:  Exam under anesthesia,  Flexible sigmoidoscopy, fistulotomy vs seton, rectal biopsy, possible mucosal advancement flap  [id number], [id number], [id number]    DIAGNOSIS:      ICD-10-CM    1. Anal fistula  K60.3    2.  Rectal bleeding  K62.5        Anesthesia: _General    Anesthesia: lines, blocks, TAP/epidural, etc: none  Time Needed:  30 minutes    Pt Position:  prone/mario-knife    Bowel Prep: fleets enema x 2         Outpatient _x__ Admit ___                  Pre-Op to be done by: _PCP____    Cardiac Clearance Done by: ____cardio____    Medications to be stopped 5 days before surgery: ___coumadin______    Additional / Special Orders:                                                                                                                                                                                                          Rosie Guadarrama MD

## 2021-02-26 ENCOUNTER — TELEPHONE (OUTPATIENT)
Dept: SURGERY | Age: 76
End: 2021-02-26

## 2021-02-26 NOTE — TELEPHONE ENCOUNTER
Per DIANE, hold warfarin now. Pt is aware. He is scheduled to see DIANE on 3/3/21 for cardiac clearance for surgery on 3/8/21.

## 2021-03-01 NOTE — PROGRESS NOTES
Limitation of Treatment order in effect during my hospitalization, the order may or may not be in effect during this procedure. I give my doctor permission to give me blood or blood products. I understand that there are risks with receiving blood such as hepatitis, AIDS, fever, or allergic reaction. I acknowledge that the risks, benefits, and alternatives of this treatment have been explained to me and that no express or implied warranty has been given by the hospital, any blood bank, or any person or entity as to the blood or blood components transfused. At the discretion of my doctor, I agree to allow observers, equipment/product representatives and allow photographing, and/or televising of the procedure, provided my name or identity is maintained confidentially. I agree the hospital may dispose of or use for scientific or educational purposes any tissue, fluid, or body parts which may be removed.     ________________________________Date________Time______ am/pm  ([employer])  Patient or Signature of Closest Relative or Legal Guardian    ________________________________Date________Time______am/pm      Page 1 of  1  Witness

## 2021-03-02 ENCOUNTER — OFFICE VISIT (OUTPATIENT)
Dept: PRIMARY CARE CLINIC | Age: 76
End: 2021-03-02
Payer: MEDICARE

## 2021-03-02 DIAGNOSIS — Z01.818 PRE-OP EXAMINATION: Primary | ICD-10-CM

## 2021-03-02 PROCEDURE — 99421 OL DIG E/M SVC 5-10 MIN: CPT | Performed by: NURSE PRACTITIONER

## 2021-03-02 NOTE — PROGRESS NOTES
The [facility] [employer] / [facility] ([facility]) [address] [facility], [address]    Acknowledgment of Informed Consent for Surgical or Medical Procedure and Sedation  I agree to allow doctor(s) Jyoti Mcdonnell and his/her associates or assistants, including residents and/or other qualified medical practitioner to perform the following medical treatment or procedure and to administer or direct the administration of sedation as necessary:  Procedure(s): EXAM UNDER ANESTHESIA, FLEXIBLE SIGMOIDOSCOPY, FISTULOTOMY VERSUS SETON, RECTAL BIOPSY, POSSIBLE [address]  My doctor has explained the following regarding the proposed procedure:   the explanation of the procedure   the benefits of the procedure   the potential problems that might occur during recuperation   the risks and side effects of the procedure which could include but are not limited to severe blood loss, infection, stroke or death   the benefits, risks and side effect of alternative procedures including the consequences of declining this procedure or any alternative procedures   the likelihood of achieving satisfactory results. I acknowledge no guarantee or assurance has been made to me regarding the results. I understand that during the course of this treatment/procedure, unforeseen conditions can occur which require an additional or different procedure. I agree to allow my physician or assistants to perform such extension of the original procedure as they may find necessary. I understand that sedation will often result in temporary impairment of memory and fine motor skills and that sedation can occasionally progress to a state of deep sedation or general anesthesia. I understand the risks of anesthesia for surgery include, but are not limited to, sore throat, hoarseness, injury to face, mouth, or teeth; nausea; headache; injury to blood vessels or nerves; death, brain damage, or paralysis.     I understand that if I have a Limitation of Treatment order in effect during my hospitalization, the order may or may not be in effect during this procedure. I give my doctor permission to give me blood or blood products. I understand that there are risks with receiving blood such as hepatitis, AIDS, fever, or allergic reaction. I acknowledge that the risks, benefits, and alternatives of this treatment have been explained to me and that no express or implied warranty has been given by the hospital, any blood bank, or any person or entity as to the blood or blood components transfused. At the discretion of my doctor, I agree to allow observers, equipment/product representatives and allow photographing, and/or televising of the procedure, provided my name or identity is maintained confidentially. I agree the hospital may dispose of or use for scientific or educational purposes any tissue, fluid, or body parts which may be removed.     ________________________________Date________Time______ am/pm  ([facility])  Patient or Signature of Closest Relative or Legal Guardian    ________________________________Date________Time______am/pm      Page 1 of  1  Witness

## 2021-03-02 NOTE — PROGRESS NOTES
PRE-OP INSTRUCTIONS FOR THE SURGICAL PATIENT YOU ARE UNABLE TO MAKE CONTACT FOR AN INTERVIEW:       All patients having surgery or anesthesia are required to be Covid tested. You will need to quarantined from the time you are tested until your surgery. 1. Follow all instructions provided to you from your surgeons office, including your ARRIVAL TIME. 2. Enter the MAIN entrance located on [address] and report to the desk. 3. Bring your insurance & prescription card and photo ID with you. You may also be asked to pay a co-pay, as you may want to bring a check or credit card with you. 4. Leave all other valuables at home. 5. Arrange for someone to drive you home and be with you for the first 24 hours after discharge. 6. Bring your medication list with you day of surgery with doses and frequency listed (including over the counter medications)  7. You must contact your surgeon for Instructions regarding:              - ALL medication instructions, especially if taking blood thinners, aspirin, or diabetic medication.  - IF  there is a change in your physical condition such as a cold, fever, rash, cuts, sores or any other infection, especially near your surgical site. 8. A Pre-op History and Physical for surgery MUST be completed by your Physician or an Urgent Care within 30 days of your procedure date. Please bring a copy with you on the day of your procedure and along with any other testing performed. 9. DO NOT EAT ANYTHING eight hours prior to arrival for surgery. May have up to 8 ounces of water 4 hours prior to arrival for surgery. NOTE: ALL Gastric, Bariatric and Bowel surgery patients MUST follow their surgeon's instructions. 10. No gum, candy, mints, or ice chips day of procedure. 11. Please refrain from drinking alcohol the day before or day of your procedure. 12. Please do not smoke the day of your procedure.     13. Dress in loose, comfortable clothing appropriate for redressing after your procedure. Do not wear jewelry (including body piercings), make-up, fingernail polish, lotion, powders or metal hairclips. 15. Contacts will need to be removed prior to surgery. You may want to bring your eye glasses to wear immediately before and after surgery. 14. Dentures will need to be removed before your procedure. 13. Bring cases for your glasses, contacts, dentures, or hearing aids to protect them while you are in surgery. 16. If you use a CPAP, please bring it with you on the day of your procedure. 17. Do not shave or wax for 72 hours prior to procedure near your operative site  18. FOR WOMAN OF CHILDBEARING AGE ONLY- please bring a urine sample with you on day of surgery or make sure we can collect on arrival.     If you have further questions, you may contact your surgeon's office or us at [phone]     Left instructions on patient's voicemail. Raz Martinez. 3/2/2021 .10:38 AM

## 2021-03-03 ENCOUNTER — OFFICE VISIT (OUTPATIENT)
Dept: CARDIOLOGY CLINIC | Age: 76
End: 2021-03-03
Payer: MEDICARE

## 2021-03-03 VITALS
DIASTOLIC BLOOD PRESSURE: 72 MMHG | BODY MASS INDEX: 33.47 KG/M2 | WEIGHT: 240 LBS | HEART RATE: 93 BPM | SYSTOLIC BLOOD PRESSURE: 130 MMHG

## 2021-03-03 DIAGNOSIS — Z01.810 ENCOUNTER FOR PREOPERATIVE ASSESSMENT FOR NONCORONARY CARDIAC SURGERY: ICD-10-CM

## 2021-03-03 DIAGNOSIS — I10 ESSENTIAL HYPERTENSION: ICD-10-CM

## 2021-03-03 DIAGNOSIS — I48.20 CHRONIC ATRIAL FIBRILLATION (HCC): Primary | ICD-10-CM

## 2021-03-03 DIAGNOSIS — E78.2 MIXED HYPERLIPIDEMIA: ICD-10-CM

## 2021-03-03 LAB — SARS-COV-2: NOT DETECTED

## 2021-03-03 PROCEDURE — 99214 OFFICE O/P EST MOD 30 MIN: CPT | Performed by: INTERNAL MEDICINE

## 2021-03-05 ENCOUNTER — ANESTHESIA EVENT (OUTPATIENT)
Dept: OPERATING ROOM | Age: 76
End: 2021-03-05
Payer: MEDICARE

## 2021-03-05 ENCOUNTER — TELEPHONE (OUTPATIENT)
Dept: SURGERY | Age: 76
End: 2021-03-05

## 2021-03-05 RX ORDER — WARFARIN SODIUM 5 MG/1
TABLET ORAL
Qty: 100 TABLET | Refills: 3 | Status: SHIPPED | OUTPATIENT
Start: 2021-03-05 | End: 2021-09-27 | Stop reason: SDUPTHER

## 2021-03-05 NOTE — TELEPHONE ENCOUNTER
Requested Prescriptions     Pending Prescriptions Disp Refills    warfarin (COUMADIN) 5 MG tablet [Pharmacy Med Name: Warfarin Sodium 5 MG Oral Tablet] 100 tablet 0     Sig: TAKE 1 TABLET BY MOUTH ONCE DAILY AND 1 AND 1/2 TABLETS ON WEDNESDAY          Number: 100  Refills: 3  Last Office Visit: 3/3/21    Next Office Visit: 7/7/21

## 2021-03-05 NOTE — TELEPHONE ENCOUNTER
Lm confirming patient's arrival time at [facility]). Reminded patient of NPO Sunday night two feets enema, off aspirin products,  needed.

## 2021-03-08 ENCOUNTER — HOSPITAL ENCOUNTER (OUTPATIENT)
Age: 76
Setting detail: OUTPATIENT SURGERY
Discharge: HOME | End: 2021-03-08
Attending: SURGERY | Admitting: SURGERY
Payer: MEDICARE

## 2021-03-08 ENCOUNTER — ANESTHESIA (OUTPATIENT)
Dept: OPERATING ROOM | Age: 76
End: 2021-03-08
Payer: MEDICARE

## 2021-03-08 VITALS
OXYGEN SATURATION: 94 % | WEIGHT: 240 LBS | RESPIRATION RATE: 16 BRPM | HEART RATE: 97 BPM | BODY MASS INDEX: 33.6 KG/M2 | DIASTOLIC BLOOD PRESSURE: 84 MMHG | HEIGHT: 71 IN | TEMPERATURE: 97.6 F | SYSTOLIC BLOOD PRESSURE: 121 MMHG

## 2021-03-08 VITALS
OXYGEN SATURATION: 100 % | SYSTOLIC BLOOD PRESSURE: 159 MMHG | TEMPERATURE: 96.3 F | RESPIRATION RATE: 13 BRPM | DIASTOLIC BLOOD PRESSURE: 95 MMHG

## 2021-03-08 DIAGNOSIS — K62.5 RECTAL BLEEDING: Primary | ICD-10-CM

## 2021-03-08 LAB
GLUCOSE BLD-MCNC: 103 MG/DL (ref 70–99)
PERFORMED ON: ABNORMAL

## 2021-03-08 PROCEDURE — 2580000003 HC RX 258: Performed by: ANESTHESIOLOGY

## 2021-03-08 PROCEDURE — 2709999900 HC NON-CHARGEABLE SUPPLY: Performed by: SURGERY

## 2021-03-08 PROCEDURE — 7100000010 HC PHASE II RECOVERY - FIRST 15 MIN: Performed by: SURGERY

## 2021-03-08 PROCEDURE — 3700000000 HC ANESTHESIA ATTENDED CARE: Performed by: SURGERY

## 2021-03-08 PROCEDURE — 46280 REMOVE ANAL FIST COMPLEX: CPT | Performed by: SURGERY

## 2021-03-08 PROCEDURE — 7100000001 HC PACU RECOVERY - ADDTL 15 MIN: Performed by: SURGERY

## 2021-03-08 PROCEDURE — 2500000003 HC RX 250 WO HCPCS: Performed by: NURSE ANESTHETIST, CERTIFIED REGISTERED

## 2021-03-08 PROCEDURE — 3700000001 HC ADD 15 MINUTES (ANESTHESIA): Performed by: SURGERY

## 2021-03-08 PROCEDURE — 6360000002 HC RX W HCPCS: Performed by: SURGERY

## 2021-03-08 PROCEDURE — 7100000011 HC PHASE II RECOVERY - ADDTL 15 MIN: Performed by: SURGERY

## 2021-03-08 PROCEDURE — 3600000003 HC SURGERY LEVEL 3 BASE: Performed by: SURGERY

## 2021-03-08 PROCEDURE — C9290 INJ, BUPIVACAINE LIPOSOME: HCPCS | Performed by: SURGERY

## 2021-03-08 PROCEDURE — 6360000002 HC RX W HCPCS: Performed by: NURSE ANESTHETIST, CERTIFIED REGISTERED

## 2021-03-08 PROCEDURE — 3600000013 HC SURGERY LEVEL 3 ADDTL 15MIN: Performed by: SURGERY

## 2021-03-08 PROCEDURE — 2580000003 HC RX 258: Performed by: SURGERY

## 2021-03-08 PROCEDURE — 7100000000 HC PACU RECOVERY - FIRST 15 MIN: Performed by: SURGERY

## 2021-03-08 PROCEDURE — 45330 DIAGNOSTIC SIGMOIDOSCOPY: CPT | Performed by: SURGERY

## 2021-03-08 RX ORDER — MAGNESIUM HYDROXIDE 1200 MG/15ML
LIQUID ORAL CONTINUOUS PRN
Status: COMPLETED | OUTPATIENT
Start: 2021-03-08 | End: 2021-03-08

## 2021-03-08 RX ORDER — SODIUM CHLORIDE 9 MG/ML
INJECTION, SOLUTION INTRAVENOUS CONTINUOUS
Status: DISCONTINUED | OUTPATIENT
Start: 2021-03-08 | End: 2021-03-08 | Stop reason: HOSPADM

## 2021-03-08 RX ORDER — ONDANSETRON 2 MG/ML
INJECTION INTRAMUSCULAR; INTRAVENOUS PRN
Status: DISCONTINUED | OUTPATIENT
Start: 2021-03-08 | End: 2021-03-08 | Stop reason: SDUPTHER

## 2021-03-08 RX ORDER — SODIUM CHLORIDE 0.9 % (FLUSH) 0.9 %
10 SYRINGE (ML) INJECTION PRN
Status: DISCONTINUED | OUTPATIENT
Start: 2021-03-08 | End: 2021-03-08 | Stop reason: HOSPADM

## 2021-03-08 RX ORDER — DIAZEPAM 5 MG/1
5 TABLET ORAL EVERY 6 HOURS PRN
Qty: 28 TABLET | Refills: 0 | Status: SHIPPED | OUTPATIENT
Start: 2021-03-08 | End: 2021-03-15

## 2021-03-08 RX ORDER — LABETALOL HYDROCHLORIDE 5 MG/ML
5 INJECTION, SOLUTION INTRAVENOUS EVERY 10 MIN PRN
Status: DISCONTINUED | OUTPATIENT
Start: 2021-03-08 | End: 2021-03-08 | Stop reason: HOSPADM

## 2021-03-08 RX ORDER — FENTANYL CITRATE 50 UG/ML
INJECTION, SOLUTION INTRAMUSCULAR; INTRAVENOUS PRN
Status: DISCONTINUED | OUTPATIENT
Start: 2021-03-08 | End: 2021-03-08 | Stop reason: SDUPTHER

## 2021-03-08 RX ORDER — SODIUM CHLORIDE, SODIUM LACTATE, POTASSIUM CHLORIDE, CALCIUM CHLORIDE 600; 310; 30; 20 MG/100ML; MG/100ML; MG/100ML; MG/100ML
INJECTION, SOLUTION INTRAVENOUS CONTINUOUS
Status: DISCONTINUED | OUTPATIENT
Start: 2021-03-08 | End: 2021-03-08 | Stop reason: HOSPADM

## 2021-03-08 RX ORDER — SODIUM CHLORIDE 0.9 % (FLUSH) 0.9 %
10 SYRINGE (ML) INJECTION EVERY 12 HOURS SCHEDULED
Status: DISCONTINUED | OUTPATIENT
Start: 2021-03-08 | End: 2021-03-08 | Stop reason: HOSPADM

## 2021-03-08 RX ORDER — OXYCODONE HYDROCHLORIDE AND ACETAMINOPHEN 5; 325 MG/1; MG/1
1 TABLET ORAL EVERY 6 HOURS PRN
Qty: 28 TABLET | Refills: 0 | Status: SHIPPED | OUTPATIENT
Start: 2021-03-08 | End: 2021-03-15

## 2021-03-08 RX ORDER — ONDANSETRON 2 MG/ML
4 INJECTION INTRAMUSCULAR; INTRAVENOUS
Status: DISCONTINUED | OUTPATIENT
Start: 2021-03-08 | End: 2021-03-08 | Stop reason: HOSPADM

## 2021-03-08 RX ORDER — PHENYLEPHRINE HYDROCHLORIDE 10 MG/ML
INJECTION INTRAVENOUS PRN
Status: DISCONTINUED | OUTPATIENT
Start: 2021-03-08 | End: 2021-03-08 | Stop reason: SDUPTHER

## 2021-03-08 RX ORDER — SUCCINYLCHOLINE/SOD CL,ISO/PF 200MG/10ML
SYRINGE (ML) INTRAVENOUS PRN
Status: DISCONTINUED | OUTPATIENT
Start: 2021-03-08 | End: 2021-03-08 | Stop reason: SDUPTHER

## 2021-03-08 RX ORDER — DEXAMETHASONE SODIUM PHOSPHATE 4 MG/ML
INJECTION, SOLUTION INTRA-ARTICULAR; INTRALESIONAL; INTRAMUSCULAR; INTRAVENOUS; SOFT TISSUE PRN
Status: DISCONTINUED | OUTPATIENT
Start: 2021-03-08 | End: 2021-03-08 | Stop reason: SDUPTHER

## 2021-03-08 RX ORDER — FENTANYL CITRATE 50 UG/ML
25 INJECTION, SOLUTION INTRAMUSCULAR; INTRAVENOUS EVERY 5 MIN PRN
Status: DISCONTINUED | OUTPATIENT
Start: 2021-03-08 | End: 2021-03-08 | Stop reason: HOSPADM

## 2021-03-08 RX ORDER — ENALAPRILAT 2.5 MG/2ML
1.25 INJECTION INTRAVENOUS
Status: DISCONTINUED | OUTPATIENT
Start: 2021-03-08 | End: 2021-03-08 | Stop reason: HOSPADM

## 2021-03-08 RX ORDER — LIDOCAINE HYDROCHLORIDE 20 MG/ML
INJECTION, SOLUTION INTRAVENOUS PRN
Status: DISCONTINUED | OUTPATIENT
Start: 2021-03-08 | End: 2021-03-08 | Stop reason: SDUPTHER

## 2021-03-08 RX ORDER — ROCURONIUM BROMIDE 10 MG/ML
INJECTION, SOLUTION INTRAVENOUS PRN
Status: DISCONTINUED | OUTPATIENT
Start: 2021-03-08 | End: 2021-03-08 | Stop reason: SDUPTHER

## 2021-03-08 RX ORDER — DOCUSATE SODIUM 100 MG/1
100 CAPSULE, LIQUID FILLED ORAL 2 TIMES DAILY
Qty: 14 CAPSULE | Refills: 0 | Status: SHIPPED | OUTPATIENT
Start: 2021-03-08 | End: 2021-03-15

## 2021-03-08 RX ORDER — GLYCOPYRROLATE 1 MG/5 ML
SYRINGE (ML) INTRAVENOUS PRN
Status: DISCONTINUED | OUTPATIENT
Start: 2021-03-08 | End: 2021-03-08 | Stop reason: SDUPTHER

## 2021-03-08 RX ORDER — MIDAZOLAM HYDROCHLORIDE 1 MG/ML
INJECTION INTRAMUSCULAR; INTRAVENOUS PRN
Status: DISCONTINUED | OUTPATIENT
Start: 2021-03-08 | End: 2021-03-08 | Stop reason: SDUPTHER

## 2021-03-08 RX ORDER — LIDOCAINE HYDROCHLORIDE 10 MG/ML
1 INJECTION, SOLUTION EPIDURAL; INFILTRATION; INTRACAUDAL; PERINEURAL
Status: DISCONTINUED | OUTPATIENT
Start: 2021-03-08 | End: 2021-03-08 | Stop reason: HOSPADM

## 2021-03-08 RX ORDER — PROPOFOL 10 MG/ML
INJECTION, EMULSION INTRAVENOUS PRN
Status: DISCONTINUED | OUTPATIENT
Start: 2021-03-08 | End: 2021-03-08 | Stop reason: SDUPTHER

## 2021-03-08 RX ORDER — HYDRALAZINE HYDROCHLORIDE 20 MG/ML
5 INJECTION INTRAMUSCULAR; INTRAVENOUS EVERY 5 MIN PRN
Status: DISCONTINUED | OUTPATIENT
Start: 2021-03-08 | End: 2021-03-08 | Stop reason: HOSPADM

## 2021-03-08 RX ORDER — FENTANYL CITRATE 50 UG/ML
50 INJECTION, SOLUTION INTRAMUSCULAR; INTRAVENOUS EVERY 5 MIN PRN
Status: DISCONTINUED | OUTPATIENT
Start: 2021-03-08 | End: 2021-03-08 | Stop reason: HOSPADM

## 2021-03-08 RX ORDER — EPHEDRINE SULFATE 50 MG/ML
INJECTION INTRAVENOUS PRN
Status: DISCONTINUED | OUTPATIENT
Start: 2021-03-08 | End: 2021-03-08 | Stop reason: SDUPTHER

## 2021-03-08 RX ADMIN — Medication 140 MG: at 08:10

## 2021-03-08 RX ADMIN — ROCURONIUM BROMIDE 30 MG: 10 INJECTION INTRAVENOUS at 08:33

## 2021-03-08 RX ADMIN — FENTANYL CITRATE 50 MCG: 50 INJECTION, SOLUTION INTRAMUSCULAR; INTRAVENOUS at 09:07

## 2021-03-08 RX ADMIN — SUGAMMADEX 400 MG: 100 INJECTION, SOLUTION INTRAVENOUS at 08:52

## 2021-03-08 RX ADMIN — LIDOCAINE HYDROCHLORIDE 50 MG: 20 INJECTION, SOLUTION INTRAVENOUS at 08:06

## 2021-03-08 RX ADMIN — SODIUM CHLORIDE, POTASSIUM CHLORIDE, SODIUM LACTATE AND CALCIUM CHLORIDE: 600; 310; 30; 20 INJECTION, SOLUTION INTRAVENOUS at 07:19

## 2021-03-08 RX ADMIN — PHENYLEPHRINE HYDROCHLORIDE 100 MCG: 10 INJECTION INTRAVENOUS at 08:16

## 2021-03-08 RX ADMIN — PHENYLEPHRINE HYDROCHLORIDE 100 MCG: 10 INJECTION INTRAVENOUS at 08:14

## 2021-03-08 RX ADMIN — ONDANSETRON 4 MG: 2 INJECTION INTRAMUSCULAR; INTRAVENOUS at 08:33

## 2021-03-08 RX ADMIN — MIDAZOLAM HYDROCHLORIDE 1 MG: 2 INJECTION, SOLUTION INTRAMUSCULAR; INTRAVENOUS at 07:55

## 2021-03-08 RX ADMIN — Medication 0.2 MG: at 07:55

## 2021-03-08 RX ADMIN — DEXAMETHASONE SODIUM PHOSPHATE 4 MG: 4 INJECTION, SOLUTION INTRAMUSCULAR; INTRAVENOUS at 08:33

## 2021-03-08 RX ADMIN — PROPOFOL 200 MG: 10 INJECTION, EMULSION INTRAVENOUS at 08:09

## 2021-03-08 RX ADMIN — ROCURONIUM BROMIDE 45 MG: 10 INJECTION INTRAVENOUS at 08:11

## 2021-03-08 RX ADMIN — ROCURONIUM BROMIDE 5 MG: 10 INJECTION INTRAVENOUS at 08:10

## 2021-03-08 RX ADMIN — PHENYLEPHRINE HYDROCHLORIDE 100 MCG: 10 INJECTION INTRAVENOUS at 08:18

## 2021-03-08 RX ADMIN — EPHEDRINE SULFATE 10 MG: 50 INJECTION INTRAVENOUS at 08:20

## 2021-03-08 RX ADMIN — EPHEDRINE SULFATE 10 MG: 50 INJECTION INTRAVENOUS at 08:22

## 2021-03-08 RX ADMIN — LIDOCAINE HYDROCHLORIDE 50 MG: 20 INJECTION, SOLUTION INTRAVENOUS at 08:08

## 2021-03-08 RX ADMIN — FENTANYL CITRATE 50 MCG: 50 INJECTION, SOLUTION INTRAMUSCULAR; INTRAVENOUS at 08:06

## 2021-03-08 RX ADMIN — SODIUM CHLORIDE, POTASSIUM CHLORIDE, SODIUM LACTATE AND CALCIUM CHLORIDE: 600; 310; 30; 20 INJECTION, SOLUTION INTRAVENOUS at 08:37

## 2021-03-08 RX ADMIN — Medication 0.2 MG: at 08:21

## 2021-03-08 RX ADMIN — PHENYLEPHRINE HYDROCHLORIDE 100 MCG: 10 INJECTION INTRAVENOUS at 08:38

## 2021-03-08 ASSESSMENT — PULMONARY FUNCTION TESTS
PIF_VALUE: 33
PIF_VALUE: 22
PIF_VALUE: 2
PIF_VALUE: 30
PIF_VALUE: 33
PIF_VALUE: 30
PIF_VALUE: 20
PIF_VALUE: 2
PIF_VALUE: 25
PIF_VALUE: 27
PIF_VALUE: 31
PIF_VALUE: 33
PIF_VALUE: 27
PIF_VALUE: 26
PIF_VALUE: 32
PIF_VALUE: 1
PIF_VALUE: 3
PIF_VALUE: 33
PIF_VALUE: 21
PIF_VALUE: 33
PIF_VALUE: 1
PIF_VALUE: 30
PIF_VALUE: 32
PIF_VALUE: 33
PIF_VALUE: 33
PIF_VALUE: 18
PIF_VALUE: 33

## 2021-03-08 ASSESSMENT — PAIN SCALES - GENERAL
PAINLEVEL_OUTOF10: 0
PAINLEVEL_OUTOF10: 0
PAINLEVEL_OUTOF10: 3

## 2021-03-08 ASSESSMENT — PAIN DESCRIPTION - PAIN TYPE: TYPE: SURGICAL PAIN

## 2021-03-08 ASSESSMENT — PAIN DESCRIPTION - PROGRESSION: CLINICAL_PROGRESSION: GRADUALLY WORSENING

## 2021-03-08 NOTE — H&P
Dom Hatch    [id number]    [facility] Same Day Surgery Update H & P  Department of General Surgery   Surgical Service   Pre-operative History and Physical  Last H & P within the last 30 days. DIAGNOSIS:   Anal fistula [K60.3]  Rectal bleeding [K62.5]    Procedure(s):  EXAM UNDER ANESTHESIA, FLEXIBLE SIGMOIDOSCOPY, FISTULOTOMY VERSUS SETON , RECTAL BIOPSY, POSSIBLE MUCOSAL ADVANCEMENT FLAP  . HISTORY OF PRESENT ILLNESS:   Patient with c/o perianal irritation and bleeding in the setting of anal fistula presents today for the above procedure. Covid 19:  Patient denies fever, chills, cough or known exposure to Covid-19.        Past Medical History:        Diagnosis Date    [facility] [address]     Cancer (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    HERNIA REPAIR Right 1995    LEG SURGERY      muscle transplant procedures for leg weakness    OTHER SURGICAL HISTORY  1/2/14    EVALUATION UNDER ANESTHESIA, RIGID SIGMOIDOSCOPY AND FISTULOTOMY    THYROID SURGERY      x2     Past Social History:  Social History     Socioeconomic History    Marital status: Single     Spouse name: Not on file    Number of children: Not on file    Years of education: Not on file    Highest education level: Not on file   Occupational History    Not on file   Social Needs    Financial resource strain: Not hard at all   [employer] insecurity     Worry: Never true     Inability: Never true   [employer] needs     Medical: No     Non-medical: No   Tobacco Use    Smoking status: Former Smoker     Packs/day: 0.00     Years: 10.00     Pack years: 0.00 Quit date: 1970     Years since quittin.5    Smokeless tobacco: Never Used    Tobacco comment: smoked 50 years ago   Substance and Sexual Activity    Alcohol use: Yes     Alcohol/week: 1.0 standard drinks     Types: 1 Cans of beer per week     Frequency: 2-4 times a month     Drinks per session: 1 or 2     Comment: beer or two every evening    Drug use: No    Sexual activity: Not on file   Lifestyle    Physical activity     Days per week: Not on file     Minutes per session: Not on file    Stress: Not on file   Relationships    Social connections     Talks on phone: Not on file     Gets together: Not on file     Attends [employer] service: Not on file     Active member of club or organization: Not on file     Attends meetings of clubs or organizations: Not on file     Relationship status: Not on file    Intimate partner violence     Fear of current or ex partner: Not on file     Emotionally abused: Not on file     Physically abused: Not on file     Forced sexual activity: Not on file   Other Topics Concern    Not on file   Social History Narrative    Not on file         Medications Prior to Admission:      Prior to Admission medications    Medication Sig Start Date End Date Taking?  Authorizing Provider   warfarin (COUMADIN) 5 MG tablet TAKE 1 TABLET BY MOUTH ONCE DAILY AND 1 AND 1/2 TABLETS ON [facility] 3/5/21   Nikolas Worthy MD   levothyroxine (EUTHYROX) 125 MCG tablet TAKE 1 TABLET BY MOUTH ONCE DAILY 20   Parth Paris MD   simvastatin (ZOCOR) 80 MG tablet TAKE ONE TABLET BY MOUTH ONCE DAILY AT NIGHT 20   Parth Paris MD   atenolol (TENORMIN) 50 MG tablet TAKE 1 TABLET BY MOUTH ONCE DAILY/ Appointment needed when refill is finished 20   Parth Paris MD   vitamin D (CHOLECALCIFEROL) 1000 UNIT TABS tablet Take 2 tablets by mouth daily 17   Parth Paris MD   aspirin EC 81 MG EC tablet Take 1 tablet by mouth daily 16   Parth Paris MD         Allergies:

## 2021-03-08 NOTE — PROGRESS NOTES
Ambulatory Surgery/Procedure Discharge Note    Vitals:    03/08/21 1030   BP: 121/84   Pulse: 97   Resp: 16   Temp: 97.6 °F (36.4 °C)   SpO2: 94%       In: 1614.9 [P.O.:20; I.V.:1594.9]  Out: 15     Restroom use offered before discharge. Yes, denies need to void    Pain assessment:  Guaze and disposable underwear in place, states pain is tolerable, more of a soreness or discomfort. Pain Level: 3        Patient discharged to home/self care.  Patient discharged via wheel chair by transporter to waiting family/S.O.       3/8/2021 11:33 AM

## 2021-03-08 NOTE — ANESTHESIA PRE PROCEDURE
Department of Anesthesiology  Preprocedure Note       Name:  Ashanti Sahu   Age:  76 y.o.  :  1945                                          MRN:  [id number]         Date:  3/8/2021      Surgeon: Nir Garcia):  Danitza Watkins MD    Procedure: Procedure(s):  EXAM UNDER ANESTHESIA, FLEXIBLE SIGMOIDOSCOPY, FISTULOTOMY VERSUS SETON , RECTAL BIOPSY, POSSIBLE MUCOSAL ADVANCEMENT FLAP  . Medications prior to admission:   Prior to Admission medications    Medication Sig Start Date End Date Taking?  Authorizing Provider   Multiple Vitamins-Minerals (MULTIVITAMIN MEN 50+ PO) Take by mouth daily   Yes Historical Provider, MD   levothyroxine (EUTHYROX) 125 MCG tablet TAKE 1 TABLET BY MOUTH ONCE DAILY 20  Yes Lilian Esposito MD   simvastatin (ZOCOR) 80 MG tablet TAKE ONE TABLET BY MOUTH ONCE DAILY AT NIGHT 20  Yes Lilian Esposito MD   atenolol (TENORMIN) 50 MG tablet TAKE 1 TABLET BY MOUTH ONCE DAILY/ Appointment needed when refill is finished 20  Yes Lilian Esposito MD   vitamin D (CHOLECALCIFEROL) 1000 UNIT TABS tablet Take 2 tablets by mouth daily 17  Yes Lilian Esposito MD   warfarin (COUMADIN) 5 MG tablet TAKE 1 TABLET BY MOUTH ONCE DAILY AND 1 AND 1/2 TABLETS ON [facility] 3/5/21   Luis Shelby MD   aspirin EC 81 MG EC tablet Take 1 tablet by mouth daily 16   Lilian Esposito MD       Current medications:    Current Facility-Administered Medications   Medication Dose Route Frequency Provider Last Rate Last Admin    sodium chloride flush 0.9 % injection 10 mL  10 mL Intravenous 2 times per day Rejeana Janice, DO        sodium chloride flush 0.9 % injection 10 mL  10 mL Intravenous PRN Rejeana Janice, DO        0.9 % sodium chloride infusion   Intravenous Continuous Rejeana Janice, DO        lactated ringers infusion   Intravenous Continuous Rejeana Janice, DO        sodium chloride flush 0.9 % injection 10 mL  10 mL Intravenous 2 times per day Danitza Watkins MD        sodium chloride flush 0.9 % injection 10 mL  10 mL Intravenous PRN Emily Hubbard MD        lactated ringers infusion   Intravenous Continuous Lina Mark MD        sodium chloride flush 0.9 % injection 10 mL  10 mL Intravenous 2 times per day Lina Mark MD        sodium chloride flush 0.9 % injection 10 mL  10 mL Intravenous PRN Lina Mark MD        lidocaine PF 1 % injection 1 mL  1 mL Intradermal Once PRN Lina Mark MD           Allergies:     Allergies   Allergen Reactions    Naproxen Other (See Comments)     Welts and nerve pain       Problem List:    Patient Active Problem List   Diagnosis Code    Thyroid disease E07.9    Constipation K59.00    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, 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    Kidney stones N20.0    Atrial fibrillation (HCC) I48.91    Hyperlipidemia E78.5    Eye disease H57.9    Chronic pain of both knees M25.561, M25.562, G89.29    Testosterone deficiency E34.9    Fistula-in-ano K60.3    Hypertension I10    Non morbid obesity due to excess calories E66.09    Swelling of joint, elbow, left M25.422    Dry skin L85.3    Vitamin D deficiency E55.9    Hyperglycemia R73.9    Basal cell carcinoma of forehead C44.319    Basal cell carcinoma (BCC) of left medial cheek C44.319    Depression F32.9    Graves' eye disease E05.00    Rectal bleeding K62.5       Past Medical History:        Diagnosis Date    Atrial fibrillation (HCC)     Cancer ([facility] [address]     Basal cell carcinoma of forehead    Epididymitis     Grave's disease     Graves disease     Hyperlipidemia     Hypertension     Irregular heart rate     Polio        Past Surgical History:        Procedure Laterality Date    ABDOMEN SURGERY      ANUS SURGERY  9-18-14    EVALUATION UNDER ANESTHESIA, FISTULA PLUG    BRONCHOSCOPY      COLONOSCOPY      repeat x10 yrs    EYE SURGERY      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    HERNIA REPAIR Right 1995    LEG SURGERY      muscle transplant procedures for leg weakness    OTHER SURGICAL HISTORY 14    EVALUATION UNDER ANESTHESIA, RIGID SIGMOIDOSCOPY AND FISTULOTOMY    THYROID SURGERY      x2       Social History:    Social History     Tobacco Use    Smoking status: Former Smoker     Packs/day: 0.00     Years: 10.00     Pack years: 0.00     Quit date: 1970     Years since quittin.5    Smokeless tobacco: Never Used    Tobacco comment: smoked 50 years ago   Substance Use Topics    Alcohol use: Yes     Alcohol/week: 1.0 standard drinks     Types: 1 Cans of beer per week     Frequency: 2-4 times a month     Drinks per session: 1 or 2     Comment: beer or two every evening                                Counseling given: Not Answered  Comment: smoked 50 years ago      Vital Signs (Current):   Vitals:    21 1041 21 0633   BP:  131/79   Pulse:  88   Resp:  18   Temp:  98.7 °F (37.1 °C)   TempSrc:  Oral   SpO2:  99%   Weight: 245 lb (111.1 kg) 240 lb (108.9 kg)   Height: 5' 11\" (1.803 m) 5' 11\" (1.803 m)                                              BP Readings from Last 3 Encounters:   21 131/79   21 130/72   21 127/82       NPO Status: Time of last liquid consumption: 2200                        Time of last solid consumption: 1800                        Date of last liquid consumption: 21                        Date of last solid food consumption: 21    BMI:   Wt Readings from Last 3 Encounters:   21 240 lb (108.9 kg)   21 240 lb (108.9 kg)   21 240 lb 3.2 oz (109 kg)     Body mass index is 33.47 kg/m².     CBC:   Lab Results   Component Value Date    WBC 8.9 2021    RBC 5.05 2021    HGB 15.6 2021    HCT 46.9 2021    MCV 92.8 2021    RDW 13.3 2021     2021       CMP:   Lab Results   Component Value Date     2021    K 4.6 2021    CL 98 2021    CO2 24 2021    BUN 14 2021    CREATININE 0.6 2021    GFRAA >60 2021    GFRAA >60 10/24/2012    AGRATIO 1.3 02/11/2021    LABGLOM >60 02/11/2021    GLUCOSE 105 02/11/2021    PROT 7.1 02/11/2021    PROT 7.2 10/24/2012    CALCIUM 9.5 02/11/2021    BILITOT 0.5 02/11/2021    ALKPHOS 95 02/11/2021    AST 29 02/11/2021    ALT 28 02/11/2021       POC Tests: No results for input(s): POCGLU, POCNA, POCK, POCCL, POCBUN, POCHEMO, POCHCT in the last 72 hours. Coags:   Lab Results   Component Value Date    PROTIME 12.2 05/17/2017    INR 1.8 01/19/2021    INR 1.9 06/19/2019    APTT 38.4 01/02/2014       HCG (If Applicable): No results found for: PREGTESTUR, PREGSERUM, HCG, HCGQUANT     ABGs: No results found for: PHART, PO2ART, RUI1ZQQ, UMT4OBH, BEART, Z0WADSTJ     Type & Screen (If Applicable):  No results found for: LABABO, LABRH    Drug/Infectious Status (If Applicable):  No results found for: HIV, HEPCAB    COVID-19 Screening (If Applicable):   Lab Results   Component Value Date    COVID19 Not Detected 03/02/2021         Anesthesia Evaluation  Patient summary reviewed no history of anesthetic complications:   Airway: Mallampati: II  TM distance: >3 FB   Neck ROM: full  Mouth opening: > = 3 FB Dental: normal exam         Pulmonary:                              Cardiovascular:    (+) hypertension:, dysrhythmias: atrial fibrillation,                   Neuro/Psych:                ROS comment: Post-polio muscle weakness GI/Hepatic/Renal:   (+) renal disease: kidney stones,          ROS comment: Rectal bleeding . Endo/Other:    (+) hyperthyroidism::., .                  ROS comment: Hx of Graves disease  Abdominal:           Vascular:                                        Anesthesia Plan      general     ASA 3       Induction: intravenous. Anesthetic plan and risks discussed with patient.                       Shantanu Simon MD   3/8/2021

## 2021-03-08 NOTE — BRIEF OP NOTE
Brief Postoperative Note      Patient: Terry Ochoa  YOB: 1945  MRN: [id number]    Date of Procedure: 3/8/2021    Pre-Op Diagnosis: Anal fistula [K60.3] Rectal bleeding [K62.5]    Post-Op Diagnosis: Same       Procedure(s):  EXAM UNDER ANESTHESIA, FLEXIBLE SIGMOIDOSCOPY, FISTULOTOMY    Surgeon(s):  Emily Hubbard MD    Assistant:  Resident: Chon Plasencia MD    Anesthesia: General    Estimated Blood Loss (mL): Minimal    Complications: None    Specimens:   * No specimens in log *    Implants:  * No implants in log *      Drains: * No LDAs found *    Findings: Anterior fistula     Electronically signed by Chon Plasencia MD on 3/8/2021 at 8:50 AM

## 2021-03-08 NOTE — ANESTHESIA POSTPROCEDURE EVALUATION
Department of Anesthesiology  Postprocedure Note    Patient: Terry Ochoa  MRN: [id number]  YOB: 1945  Date of evaluation: 3/8/2021  Time:  10:47 AM     Procedure Summary     Date: 03/08/21 Room / Location: [address] [place] [address]    Anesthesia Start: 0691 Anesthesia Stop: 7301    Procedure: EXAM UNDER ANESTHESIA, FLEXIBLE SIGMOIDOSCOPY, FISTULOTOMY (N/A ) Diagnosis:       Anal fistula      Rectal bleeding      (Anal fistula [K60.3] Rectal bleeding [K62.5])    Surgeons: Emily Hubbard MD Responsible Provider: Lina Mark MD    Anesthesia Type: general ASA Status: 3          Anesthesia Type: general    Joslyn Phase I: Joslyn Score: 10    Joslyn Phase II:      Last vitals: Reviewed and per EMR flowsheets.        Anesthesia Post Evaluation    Patient location during evaluation: bedside  Patient participation: complete - patient participated  Level of consciousness: awake  Airway patency: patent  Nausea & Vomiting: no nausea and no vomiting  Complications: no  Cardiovascular status: hemodynamically stable  Respiratory status: acceptable  Hydration status: stable

## 2021-03-08 NOTE — PROGRESS NOTES
Patient admitted to PACU # 07 from OR at 0905 post EXAM UNDER ANESTHESIA, FLEXIBLE SIGMOIDOSCOPY, FISTULOTOMY    per Dr. Teodoro Buitrago. Attached to PACU monitoring system and report received from anesthesia provider. Patient was reported to be hemodynamically stable during procedure. Patient drowsy on admission and denied pain. Pt Afib on monitor. Pt on non rebreather 6 L. IVF infusing. Will continue to monitor.

## 2021-03-08 NOTE — OP NOTE
OPERATIVE NOTE     Shayne Ny  1945  [id number]    DATE OF PROCEDURE: 03/08/21     PREOPERATIVE DIAGNOSES: Transsphincteric anal fistula     POSTOPERATIVE DIAGNOSES: same     PROCEDURE:   1. Fistulotomy - transsphicnteric  2. Flexible sigmoidoscopy. SURGEON: MD Kang Contreras, PGY-1, resident     ANESTHESIA: GET. COMPLICATIONS: None. EBL: 10 cc    SPECIMEN: none     FINDINGS: Long anterior midline transsphincteric anal fistula extending from distal anal canal to perineum, a few cm from base of scrotum. Containing some internal sphincter and less than 1/3 external sphincter muscle     INDICATIONS: The patient is a 70-year-old male who has had symptoms of long standing fistula. Previous fistula plug had been attempted a few years ago without success. Patient was recommended to undergo EUA, flex sig, fistulotomy vs seton, possible MAF. The risks, benefits, and alternatives of procedure were explained to the patient including risks of bleeding, infection, pelvic sepsis, unexpected findings, missed lesions, urinary retention, anal stenosis, and potential  sphincter damage and dysfunction, either temporary or permanent. Patient understood all of these risks and agreed to  proceed. Typical postoperative course was discussed, including pain and likely need for up to 3 weeks of recovery. PROCEDURE DETAILS:   The patient was brought to the operating theater. The patient was placed in the prone mario-knife position after induction of anesthesia. Buttocks were taped apart carefully using tape. Flexible sigmoidoscopy was performed up to 20 cm. The mucosa was visualized  with air insufflation. Findings include: normal. The sigmoidoscope was removed. The patient's perianal region was prepped and draped in usual sterile fashion using Betadine prep solution. Time-out was performed confirming the patient's identity as well as the operative site. Antibiotics were not indicated. SCDs were on and functioning. All safety points were followed. Digital rectal exam and anoscopy was performed in all 4 quadrants using lighted anal retractor, noting:    Internal and external wounds of fistula easily visualized. Fistula probe used to pass through without difficulty. We noted a long anterior midline transsphincteric anal fistula extending from distal anal canal to perineum, a few cm from base of scrotum. Containing some internal sphincter and less than 1/3 external sphincter muscle. Given patient's desire for definitive treatment and less than 1/3 external sphincter involvement, we went ahead with a fistulotomy over the probe. Base of fistula debrided sharply with curette and external wound debrided with cautery. Anoscopy was then performed again, wounds were irrigated, confirming complete hemostasis. 20 cc of Expirel anesthetic was injected for perianal nerve block. The patient tolerated the procedure well, was woken up and brought to the PACU in stable condition. All counts were correct x2 at the end of the procedure. No complications. Patient's wife updated on operative findings. Kim Marcial.  Nava Wilhelm MD    Electronically signed by Miriam Silva MD on 3/8/2021 at 8:53 AM

## 2021-03-08 NOTE — FLOWSHEET NOTE
Pt wife, Christina Roy called and updated on pt status. Christina Roy made aware that pt will be transferring to \[facility]\"" shortly.

## 2021-03-30 ENCOUNTER — OFFICE VISIT (OUTPATIENT)
Dept: SURGERY | Age: 76
End: 2021-03-30

## 2021-03-30 ENCOUNTER — TELEPHONE (OUTPATIENT)
Dept: SURGERY | Age: 76
End: 2021-03-30

## 2021-03-30 VITALS
BODY MASS INDEX: 33.32 KG/M2 | OXYGEN SATURATION: 97 % | SYSTOLIC BLOOD PRESSURE: 144 MMHG | WEIGHT: 238 LBS | DIASTOLIC BLOOD PRESSURE: 79 MMHG | TEMPERATURE: 98.6 F | HEIGHT: 71 IN | HEART RATE: 104 BPM

## 2021-03-30 DIAGNOSIS — K60.3 ANAL FISTULA: Primary | ICD-10-CM

## 2021-03-30 PROCEDURE — 99024 POSTOP FOLLOW-UP VISIT: CPT | Performed by: SURGERY

## 2021-03-30 RX ORDER — DOCUSATE SODIUM 100 MG/1
100 CAPSULE, LIQUID FILLED ORAL 2 TIMES DAILY
Qty: 60 CAPSULE | Refills: 0 | Status: SHIPPED | OUTPATIENT
Start: 2021-03-30 | End: 2021-04-29

## 2021-03-30 NOTE — TELEPHONE ENCOUNTER
Prescription for colace sent electronically to [facility] at [facility] on [address].

## 2021-03-30 NOTE — PROGRESS NOTES
[address] [address] [address] [facility]  Dept: [phone]  Dept Fax: [phone]  Loc: [phone]    Visit Date: 3/30/2021    Patria Stanton is a 76 y.o. male who presents today for: Post-Op Check (Anal fistula  3-8-21)      Subjective:     Patria Stanton is a 76 y.o. male here for postoperative visit after anal fistulotomy about 3 weeks ago. Still having some bleeding and discomfort, not unexpected. Patient's problem list, medications, past medical, surgical, family, and social histories were reviewed and updated in the chart as indicated today. 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 Discussed importance of keeping the wound clean. Denies incontinence, but still having oozing and discharge, not unexpected. Likely will take the better part of 2 months to completely heal.    DISPOSITION: I will see him back in 6 weeks to reassess. Note completed using dictation software, please excuse any errors. Referring/primary care physician updated through [employer] note if PCP was listed.     Electronically signed by Miriam Silva MD on 3/30/2021 at 10:24 AM

## 2021-04-01 ENCOUNTER — TELEPHONE (OUTPATIENT)
Dept: SURGERY | Age: 76
End: 2021-04-01

## 2021-04-01 NOTE — TELEPHONE ENCOUNTER
Patient complaining of pain 6/10 under ribs when lying down. Pain goes away when sitting and moving around. He is also complaining of nausea, fatigue, and decreased appetite. He states that he is not constipated. Please advise.

## 2021-04-02 NOTE — TELEPHONE ENCOUNTER
Patient states that pain under ribs is easing but he is concerned with \" orange urine\". Advised patient to contact PCP. Patient agreeable.

## 2021-04-09 ENCOUNTER — TELEPHONE (OUTPATIENT)
Dept: INTERNAL MEDICINE CLINIC | Age: 76
End: 2021-04-09

## 2021-04-09 NOTE — TELEPHONE ENCOUNTER
So tell him it would be very unusual for the [facility] to just cause pain in the arms but he could stop at for say two weeks and then restart and if the pain reoccurs we can discuss that later

## 2021-04-09 NOTE — TELEPHONE ENCOUNTER
Patient called stating he woke up yesterday morning with extreme pain in his fore arms. He believes it could be from the statin medication.      Please call to advise

## 2021-04-12 ENCOUNTER — TELEPHONE (OUTPATIENT)
Dept: INTERNAL MEDICINE CLINIC | Age: 76
End: 2021-04-12

## 2021-04-12 NOTE — TELEPHONE ENCOUNTER
Pt called stating he has been declining rapidly. Has been experiencing leg weakness. Has hard time moving around, chairs and seats are hard to get out of and has needed booster seat to use restroom. Needs advice as to what he needs to do next.   Please advise

## 2021-04-13 NOTE — TELEPHONE ENCOUNTER
Spoke w him and hed like home health eval- will need OT and PT and probably an aide? Or eval from a nurse- hopefully theyll accept his last visit w me or we can do a VV next week ???  He is home bound- cant barely get out of bEd on his own

## 2021-04-19 ENCOUNTER — TELEPHONE (OUTPATIENT)
Dept: INTERNAL MEDICINE CLINIC | Age: 76
End: 2021-04-19

## 2021-04-19 NOTE — TELEPHONE ENCOUNTER
Mia Puentes with [facility] called to get a verble for the patients start of care date 4/20/2021. Phone number [phone]    Please call and advise.

## 2021-04-26 ENCOUNTER — TELEPHONE (OUTPATIENT)
Dept: PHARMACY | Age: 76
End: 2021-04-26

## 2021-04-26 NOTE — TELEPHONE ENCOUNTER
Called pt re: INR several months overdue. He had been holding warfarin due to rectal bleeding. Pt had surgery on 3/8/21, but continues to have rectal bleeding. He remains off warfarin and states his surgeon and Dr Linda Duncan are aware. He is scheduled to f/u with surgeon on 5/11/21, and hopes to resume warfarin soon. Asked pt to contact the clinic when he resumes warfarin so we can schedule an INR check.      Liliam Garcia, PharmD, [facility]  [facility]ication Management [facility]   [facility] [address] Ph: [phone]  [address] Ph: [phone]  4/26/2021 4:31 PM

## 2021-05-11 ENCOUNTER — OFFICE VISIT (OUTPATIENT)
Dept: SURGERY | Age: 76
End: 2021-05-11

## 2021-05-11 VITALS
BODY MASS INDEX: 33.04 KG/M2 | HEART RATE: 96 BPM | WEIGHT: 236 LBS | HEIGHT: 71 IN | DIASTOLIC BLOOD PRESSURE: 84 MMHG | RESPIRATION RATE: 16 BRPM | TEMPERATURE: 97.3 F | SYSTOLIC BLOOD PRESSURE: 139 MMHG

## 2021-05-11 DIAGNOSIS — K60.3 ANAL FISTULA: Primary | ICD-10-CM

## 2021-05-11 PROCEDURE — 99024 POSTOP FOLLOW-UP VISIT: CPT | Performed by: SURGERY

## 2021-06-02 NOTE — TELEPHONE ENCOUNTER
Tried calling patient to follow up on appt from 5/11 to see if any statements had been made with regards to restarting warfarin. LM asking patient to call back with an update.

## 2021-06-09 NOTE — TELEPHONE ENCOUNTER
Patient called and said he is still experiencing the rectal bleeding and remains off the warfarin. He has appointments scheduled with both surgeon and cardiology for mid July and hopes to have further updates at that time. Advised patient to give us a call after those appointments and let us know what the plan is from there.

## 2021-07-13 ENCOUNTER — OFFICE VISIT (OUTPATIENT)
Dept: SURGERY | Age: 76
End: 2021-07-13
Payer: MEDICARE

## 2021-07-13 VITALS
DIASTOLIC BLOOD PRESSURE: 83 MMHG | SYSTOLIC BLOOD PRESSURE: 135 MMHG | WEIGHT: 239 LBS | BODY MASS INDEX: 33.46 KG/M2 | OXYGEN SATURATION: 97 % | HEIGHT: 71 IN | TEMPERATURE: 97.2 F | HEART RATE: 78 BPM

## 2021-07-13 DIAGNOSIS — K62.5 RECTAL BLEEDING: ICD-10-CM

## 2021-07-13 DIAGNOSIS — K60.3 ANAL FISTULA: Primary | ICD-10-CM

## 2021-07-13 PROCEDURE — 99212 OFFICE O/P EST SF 10 MIN: CPT | Performed by: SURGERY

## 2021-07-13 NOTE — PROGRESS NOTES
[address] [address] [address]   [address] [facility]  Dept: [phone]  Dept Fax: [phone]  Loc: [phone]    Visit Date: 7/13/2021    Mallie Claude is a 68 y.o. male who presents today for: Follow-up (fistula )      HPI:       Mallie Claude is a 68 y.o. male known to me for anal fistulotomy almost 4 months ago. Overall seems to be improving.   Still having occasional discomfort and bleeding    Past Medical History:   Diagnosis Date    Atrial fibrillation (HCC)     Cancer (HCC)     Basal cell carcinoma of forehead    Epididymitis     Grave's disease     Graves disease     Hyperlipidemia     Hypertension     Irregular heart rate     Polio      Past Surgical History:   Procedure Laterality Date    ABDOMEN SURGERY      ANUS SURGERY  9-18-14    EVALUATION UNDER ANESTHESIA, FISTULA PLUG    ANUS SURGERY N/A 3/8/2021    EXAM UNDER ANESTHESIA, FLEXIBLE SIGMOIDOSCOPY, FISTULOTOMY performed by Drew Lee MD at [place] [address] COLONOSCOPY      repeat x10 yrs    EYE SURGERY      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    HERNIA REPAIR Right 1995    LEG SURGERY      muscle transplant procedures for leg weakness    OTHER SURGICAL HISTORY  1/2/14    EVALUATION UNDER ANESTHESIA, RIGID SIGMOIDOSCOPY AND FISTULOTOMY    THYROID SURGERY      x2       Current Outpatient Medications:     Multiple Vitamins-Minerals (MULTIVITAMIN MEN 50+ PO), Take by mouth daily, Disp: , Rfl:     warfarin (COUMADIN) 5 MG tablet, TAKE 1 TABLET BY MOUTH ONCE DAILY AND 1 AND 1/2 TABLETS ON WEDNESDAY, Disp: 100 tablet, Rfl: 3    levothyroxine (EUTHYROX) 125 MCG tablet, TAKE 1 TABLET BY MOUTH ONCE DAILY, Disp: 90 tablet, Rfl: 3    simvastatin (ZOCOR) 80 MG tablet, TAKE ONE TABLET BY MOUTH ONCE DAILY AT NIGHT, Disp: 90 tablet, Rfl: 3    atenolol (TENORMIN) 50 MG tablet, TAKE 1 TABLET BY MOUTH ONCE DAILY/ Appointment needed when refill is finished, Disp: 90 tablet, Rfl: 3    vitamin D (CHOLECALCIFEROL) 1000 UNIT TABS tablet, Take 2 tablets by mouth daily, Disp: 60 tablet, Rfl:     aspirin EC 81 MG EC tablet, Take 1 tablet by mouth daily, Disp: 30 tablet, Rfl: 3  Allergies   Allergen Reactions    Naproxen Other (See Comments)     Welts and nerve pain     Past Surgical History:   Procedure Laterality Date    ABDOMEN SURGERY      ANUS SURGERY  14    EVALUATION UNDER ANESTHESIA, FISTULA PLUG    ANUS SURGERY N/A 3/8/2021    EXAM UNDER ANESTHESIA, FLEXIBLE SIGMOIDOSCOPY, FISTULOTOMY performed [facility] Dylan [facility]      repeat x10 yrs    EYE SURGERY      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 Social History:   Social History     Tobacco Use    Smoking status: Former Smoker     Packs/day: 0.00     Years: 10.00     Pack years: 0.00     Quit date: 1970     Years since quittin.8    Smokeless tobacco: Never Used    Tobacco comment: smoked 50 years ago   Substance Use Topics    Alcohol use: Yes     Alcohol/week: 1.0 standard drinks     Types: 1 Cans of beer per week     Comment: beer or two every evening      Tobacco cessation counseling provided as appropriate. REVIEW OF SYSTEMS:    Pertinent positives and negatives are mentioned in the HPI. Otherwise, all other systems were reviewed and negative. Objective:     Physical Exam   /83   Pulse 78   Temp 97.2 °F (36.2 °C) (Oral)   Ht 5' 11\" (1.803 m)   Wt 239 lb (108.4 kg)   SpO2 97%   BMI 33.33 kg/m²   Constitutional: Appears well-developed and well-nourished. Grooming appropriate. No gross deformities. Body mass index is 33.33 kg/m². Eyes: No scleral icterus.  Conjunctiva/lids normal. Vision intact grossly. Pupils equal/symmetric, reactive bilaterally. ENT: External ears/nose without defect, scars, or masses. Hearing grossly intact. No facial deformity. Lips normal, normal dentition. Neck: No masses. Trachea midline. No crepitus. Thyroid not enlarged. Cardiovascular: Normal rate. No peripheral edema. Abdominal aorta normal size to palpation. Pulmonary/Chest: Effort normal. No respiratory distress. No wheezes. No use of accessory muscles. Musculoskeletal: Normal range of motion of head/neck, without deformity, pain, or crepitus, with normal strength and tone. Normal gait. Nails without clubbing or cyanosis. Neurological: Alert and oriented to person, place, and time. No gross deficits. Sensation intact. Skin: Skin is dry. No rashes noted. No pallor. No induration of nodules. Psychiatric: Normal mood and affect. Behavior normal. Oriented to person, place, and time. Judgment and insight reasonable. Abdominal/wound: Fistulotomy site seems to be slowly healing, silver nitrate applied today in the office. Labs reviewed: None     Imaging reviewed: None    Assessment/Plan:       A/P:  Established problem(s): Anal fistula, improving  Additional workup/treatment planned: Continue with local wound care  Risk of complications/morbidity: Moderate    Though it has been almost 4 months in surgery, I am encouraged as the wound seems to be getting smaller. I used some silver nitrate for chemical cauterization today in the office. I am encouraged and hopeful that this will heal within the next 4 to 6 weeks. Continue with current prescription medications    DISPOSITION: Follow-up 6 weeks    My findings will be relayed to consulting practitioner or PCP via Epic note    Note completed using dictation software, please excuse any errors.     Electronically signed by Nancy Nichols MD on 7/13/2021 at 9:36 AM

## 2021-07-15 ENCOUNTER — OFFICE VISIT (OUTPATIENT)
Dept: CARDIOLOGY CLINIC | Age: 76
End: 2021-07-15
Payer: MEDICARE

## 2021-07-15 VITALS
WEIGHT: 235 LBS | HEART RATE: 89 BPM | SYSTOLIC BLOOD PRESSURE: 124 MMHG | DIASTOLIC BLOOD PRESSURE: 86 MMHG | BODY MASS INDEX: 32.78 KG/M2

## 2021-07-15 DIAGNOSIS — E78.5 HYPERLIPIDEMIA, UNSPECIFIED HYPERLIPIDEMIA TYPE: ICD-10-CM

## 2021-07-15 DIAGNOSIS — I48.20 CHRONIC ATRIAL FIBRILLATION (HCC): ICD-10-CM

## 2021-07-15 DIAGNOSIS — I10 HTN (HYPERTENSION), BENIGN: Primary | ICD-10-CM

## 2021-07-15 PROCEDURE — 99214 OFFICE O/P EST MOD 30 MIN: CPT | Performed by: INTERNAL MEDICINE

## 2021-07-15 NOTE — PROGRESS NOTES
Frequency of Communication with Friends and Family:     Frequency of Social Gatherings with Friends and Family:     Attends Islam Services:     Active Member of Clubs or Organizations:     Attends Club or Organization Meetings:     Marital Status:    Intimate Partner Violence:     Fear of Current or Ex-Partner:     Emotionally Abused:     Physically Abused:     Sexually Abused:         Patient has a family history is not on file. Patient  has a past medical history of Atrial fibrillation ([facility] [address] Cancer ([facility] [address] Epididymitis, Grave's disease, Graves disease, Hyperlipidemia, Hypertension, Irregular heart rate, and Polio. Current Outpatient Medications   Medication Sig Dispense Refill    Multiple Vitamins-Minerals (MULTIVITAMIN MEN 50+ PO) Take by mouth daily      levothyroxine (EUTHYROX) 125 MCG tablet TAKE 1 TABLET BY MOUTH ONCE DAILY 90 tablet 3    atenolol (TENORMIN) 50 MG tablet TAKE 1 TABLET BY MOUTH ONCE DAILY/ Appointment needed when refill is finished 90 tablet 3    vitamin D (CHOLECALCIFEROL) 1000 UNIT TABS tablet Take 2 tablets by mouth daily 60 tablet     aspirin EC 81 MG EC tablet Take 1 tablet by mouth daily 30 tablet 3    warfarin (COUMADIN) 5 MG tablet TAKE 1 TABLET BY MOUTH ONCE DAILY AND 1 AND 1/2 TABLETS ON WEDNESDAY (Patient not taking: Reported on 7/15/2021) 100 tablet 3    simvastatin (ZOCOR) 80 MG tablet TAKE ONE TABLET BY MOUTH ONCE DAILY AT NIGHT (Patient not taking: Reported on 7/15/2021) 90 tablet 3     No current facility-administered medications for this visit. Vitals  Weight: 235 lb (106.6 kg)  Blood Pressure: BP: (124)/(86)    Pulse: 89           Review of Systems   Constitutional: Negative. HENT: Negative. Eyes: Negative. Cardiovascular: Negative. Gastrointestinal: Negative. Endocrine: Negative. Genitourinary: Negative. Musculoskeletal: Negative. Skin: Negative. Allergic/Immunologic: Negative. Neurological: Negative. Hematological: Negative. Psychiatric/Behavioral: Negative. Objective:   Physical Exam   Constitutional: He is oriented to person, place, and time. He appears well-developed and well-nourished. HENT:   Head: Normocephalic and atraumatic. Eyes: EOM are normal. Pupils are equal, round, and reactive to light. Neck: Normal range of motion. Neck supple. Cardiovascular: Normal rate and regular rhythm. Exam reveals no friction rub. No murmur heard. Pulmonary/Chest: Effort normal and breath sounds normal.   Abdominal: Soft. Bowel sounds are normal.   Musculoskeletal: Normal range of motion. He exhibits no edema or deformity. Neurological: He is alert and oriented to person, place, and time. Skin: Skin is warm and dry. Psychiatric: He has a normal mood and affect. His behavior is normal. Thought content normal.       Assessment:      [id number] Score for Atrial Fibrillation Stroke Risk   Risk   Factors  Component Value   C CHF No 0   H HTN Yes 1   A2 Age >= 76 Yes,  (77 y.o.) 2   D DM No 0   S2 Prior Stroke/TIA No 0   V Vascular Disease No 0   A Age 74-69 No,  (77 y.o.) 0   Sc Sex male 0    [id number]  Score  3   Score last updated 13/0/49 07:49 AM    Click here for a link to the UpToDate guideline \"Atrial Fibrillation: Anticoagulation therapy to prevent embolization    Disclaimer: Risk Score calculation is dependent on accuracy of patient problem list and past encounter diagnosis. Diagnosis Orders   1. HTN (hypertension), benign     2. Hyperlipidemia, unspecified hyperlipidemia type     3. Chronic atrial fibrillation (HCC)             Plan:      History of graves disease and had orbital decompression with resultant double vision. Had full polio. Has AF. ECG with late transition. off warfarin because of severe rectal bleeding. Off warfarin for 3 months. HTN:  Controlled. Takes synthroid. SOB:  Seems controlled  Hypercholesterolemia:    controllled with simva.   Snores occasionally but doesn't exhibit all KATARINA. Needs another anal fistula surgery and is off warfarin. Doesn't want watchman. Goal INR can be 1.8 -2.2. STILL OFF WARFARIN FOR C AF.

## 2021-08-03 NOTE — TELEPHONE ENCOUNTER
Patient last seen at [facility] on 1/19/21. He remains off warfarin per cardiology note 7/15/21. As it has been >3 months since last visit, will d/c pt from [facility]. If patient resumes warfarin, please send new referral to clinic. LVM to notify pt and asked him to call if/when he resumes warfarin.  Message routed to referring MD.    Wilfrido Guerra, PharmD, [facility]  Medication Management Clinic   [facility] Ph: [phone]  [facility] Ph: [phone]  8/3/2021 9:57 AM

## 2021-08-16 ENCOUNTER — TELEPHONE (OUTPATIENT)
Dept: INTERNAL MEDICINE CLINIC | Age: 76
End: 2021-08-16

## 2021-08-16 NOTE — TELEPHONE ENCOUNTER
So he had his last AWV 2/2021 so not due in nov for that but I havent seen him since then so have him see me in the next few weeks for a 6 mo f/u then we will do the awv in feb- he really doesn't need any labs -we will talk and see if there are any to do when I see him otherwise we cn wait on labs till feb

## 2021-08-16 NOTE — TELEPHONE ENCOUNTER
Pt would like to have labs ordered. Pt would like to get them done in the next week. Pt is unsure if he needs to have an appointment for AWV in addition to the labs. I am scheduling him for AWV now. Please advise.

## 2021-08-31 ENCOUNTER — OFFICE VISIT (OUTPATIENT)
Dept: SURGERY | Age: 76
End: 2021-08-31
Payer: MEDICARE

## 2021-08-31 VITALS
OXYGEN SATURATION: 97 % | WEIGHT: 235 LBS | RESPIRATION RATE: 16 BRPM | HEIGHT: 71 IN | HEART RATE: 90 BPM | DIASTOLIC BLOOD PRESSURE: 79 MMHG | BODY MASS INDEX: 32.9 KG/M2 | SYSTOLIC BLOOD PRESSURE: 134 MMHG

## 2021-08-31 DIAGNOSIS — K62.5 RECTAL BLEEDING: ICD-10-CM

## 2021-08-31 DIAGNOSIS — K60.3 ANAL FISTULA: Primary | ICD-10-CM

## 2021-08-31 PROCEDURE — 99213 OFFICE O/P EST LOW 20 MIN: CPT | Performed by: SURGERY

## 2021-08-31 NOTE — PROGRESS NOTES
[address] [address] [address] [facility] [address] [address]  Dept: [phone]  Dept Fax: [phone]  Loc: [phone]    Visit Date: 8/31/2021    Sean Mason is a 68 y.o. male who presents today for: Follow-up (fistula, no new complaints.  )      HPI:       Sean Mason is a 68 y.o. male known to me for history of anal fistula, status post fistulotomy in March of this year. Has had slow wound healing, due to medical comorbidities, but does think that things are healing up. Still having intermittent drainage and seepage.     Past Medical History:   Diagnosis Date    [facility]rial fibrillation ([facility]     Cancer ([facility]     Basal cell carcinoma of forehead    Epididymitis     Grave's disease     Graves disease     Hyperlipidemia     Hypertension     Irregular heart rate     Polio      Past Surgical History:   Procedure Laterality Date    ABDOMEN SURGERY      ANUS SURGERY  9-18-14    EVALUATION UNDER ANESTHESIA, FISTULA PLUG    ANUS SURGERY N/A 3/8/2021    EXAM UNDER ANESTHESIA, FLEXIBLE SIGMOIDOSCOPY, FISTULOTOMY performed by Nicolas Rushing MD at [facility]      repeat x10 yrs    EYE SURGERY      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    HERNIA REPAIR Right 1995    LEG SURGERY      muscle transplant procedures for leg weakness    OTHER SURGICAL HISTORY  1/2/14    EVALUATION UNDER ANESTHESIA, RIGID SIGMOIDOSCOPY AND FISTULOTOMY    THYROID SURGERY      x2       Current Outpatient Medications:     Multiple Vitamins-Minerals (MULTIVITAMIN MEN 50+ PO), Take by mouth daily, Disp: , Rfl:     warfarin (COUMADIN) 5 MG tablet, TAKE 1 TABLET BY MOUTH ONCE DAILY AND 1 AND 1/2 TABLETS ON WEDNESDAY, Disp: 100 tablet, Rfl: 3    levothyroxine (EUTHYROX) 125 MCG tablet, TAKE 1 TABLET BY MOUTH ONCE DAILY, Disp: 90 tablet, Rfl: 3    simvastatin (ZOCOR) 80 MG tablet, TAKE ONE TABLET BY MOUTH ONCE DAILY AT NIGHT, Disp: 90 tablet, Rfl: 3    atenolol (TENORMIN) 50 MG tablet, TAKE 1 TABLET BY MOUTH ONCE DAILY/ Appointment needed when refill is finished, Disp: 90 tablet, Rfl: 3    vitamin D (CHOLECALCIFEROL) 1000 UNIT TABS tablet, Take 2 tablets by mouth daily, Disp: 60 tablet, Rfl:     aspirin EC 81 MG EC tablet, Take 1 tablet by mouth daily (Patient not taking: Reported on 2021), Disp: 30 tablet, Rfl: 3  Allergies   Allergen Reactions    Naproxen Other (See Comments)     Welts and nerve pain     Past Surgical History:   Procedure Laterality Date    ABDOMEN SURGERY      ANUS SURGERY  14    EVALUATION UNDER ANESTHESIA, FISTULA PLUG    ANUS SURGERY N/A 3/8/2021    EXAM UNDER ANESTHESIA, FLEXIBLE SIGMOIDOSCOPY, FISTULOTOMY performed by Daisy Nur MD at [place] [address] COLONOSCOPY      repeat x10 yrs    EYE SURGERY      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 Social History:   Social History     Tobacco Use    Smoking status: Former Smoker     Packs/day: 0.00     Years: 10.00     Pack years: 0.00     Quit date: 1970     Years since quittin.0    Smokeless tobacco: Never Used    Tobacco comment: smoked 50 years ago   Substance Use Topics    Alcohol use: Yes     Alcohol/week: 1.0 standard drinks     Types: 1 Cans of beer per week     Comment: beer or two every evening      Tobacco cessation counseling provided as appropriate. REVIEW OF SYSTEMS:    Pertinent positives and negatives are mentioned in the HPI. Otherwise, all other systems were reviewed and negative.     Objective:     Physical Exam   /79 (Site: Left Upper Arm, Position: Sitting, Cuff Size: Medium Adult)   Pulse 90   Resp 16   Ht 5' 11\" (1.803 m)   Wt 235 lb (106.6 kg)   SpO2 97%   BMI 32.78 kg/m²   Constitutional: Appears well-developed and well-nourished. Grooming appropriate. No gross deformities. Body mass index is 32.78 kg/m². Eyes: No scleral icterus. Conjunctiva/lids normal. Vision intact grossly. Pupils equal/symmetric, reactive bilaterally. ENT: External ears/nose without defect, scars, or masses. Hearing grossly intact. No facial deformity. Lips normal, normal dentition. Neck: No masses. Trachea midline. No crepitus. Thyroid not enlarged. Cardiovascular: Normal rate. No peripheral edema. Abdominal aorta normal size to palpation. Pulmonary/Chest: Effort normal. No respiratory distress. No wheezes. No use of accessory muscles. Musculoskeletal: Normal range of motion of head/neck, without deformity, pain, or crepitus, with normal strength and tone. Normal gait. Nails without clubbing or cyanosis. Neurological: Alert and oriented to person, place, and time. No gross deficits. Sensation intact. Skin: Skin is dry. No rashes noted. No pallor. No induration of nodules. Psychiatric: Normal mood and affect. Behavior normal. Oriented to person, place, and time. Judgment and insight reasonable. Abdominal/wound: Fistulotomy site healing well. But does have stool staining around the perianal skin    Labs reviewed: None     Imaging reviewed: None    Assessment/Plan:       A/P:  Established problem(s): Anal fistula healing; anal seepage  Additional workup/treatment planned: Stool bulking, repeat colonoscopy with Dr. Johny Tobin of complications/morbidity: Moderate    Fortunately, his fistulotomy site does appear to be healed. He had prolonged healing due to his comorbidities as well as the location of the wound. Unfortunately, he still has some anal seepage. I discussed with him conservative methods, such as avoiding coffee, alcohol, citrus that can lead to sphincter weakening and leakage.   I also discussed stool bulking with fiber and other over-the-counter interventions. He also mentioned he is having some intermittent rectal bleeding. If this persists, he should see Dr. Anamaria Nevarez for another colonoscopy. DISPOSITION:  F/u with me PRN    My findings will be relayed to consulting practitioner or PCP via Epic note    Note completed using dictation software, please excuse any errors.     Electronically signed by Drew Lee MD on 8/31/2021 at 11:51 AM

## 2021-09-22 ENCOUNTER — TELEPHONE (OUTPATIENT)
Dept: CARDIOLOGY CLINIC | Age: 76
End: 2021-09-22

## 2021-09-22 RX ORDER — WARFARIN SODIUM 5 MG/1
5 TABLET ORAL DAILY
Qty: 100 TABLET | Refills: 2 | Status: CANCELLED | OUTPATIENT
Start: 2021-09-22

## 2021-09-23 RX ORDER — ATENOLOL 50 MG/1
TABLET ORAL
Qty: 90 TABLET | Refills: 0 | Status: SHIPPED | OUTPATIENT
Start: 2021-09-23 | End: 2021-11-29 | Stop reason: SDUPTHER

## 2021-09-27 ENCOUNTER — TELEPHONE (OUTPATIENT)
Dept: PHARMACY | Age: 76
End: 2021-09-27

## 2021-09-27 DIAGNOSIS — I48.91 ATRIAL FIBRILLATION, UNSPECIFIED TYPE (HCC): Primary | ICD-10-CM

## 2021-09-27 RX ORDER — WARFARIN SODIUM 5 MG/1
TABLET ORAL
Qty: 100 TABLET | Refills: 3 | Status: SHIPPED | OUTPATIENT
Start: 2021-09-27 | End: 2022-03-02

## 2021-09-27 NOTE — TELEPHONE ENCOUNTER
[facility] Medication Refills:        warfarin (COUMADIN) 5 MG tablet  TAKE 1 TABLET BY MOUTH ONCE DAILY AND 1 AND 1/2 TABLETS ON WEDNESDAY, Disp-100 tablet, R-3  Normal Last Dose: Not Recorded     Pharmacy:[facility] [address] [place] [address], [address] [place] [address]      Last Office Visit: 03/03/21    Next Office Visit: 10/28/21    Last Refill: 03/05/21    Last Labs: 03/08/21

## 2021-09-27 NOTE — TELEPHONE ENCOUNTER
Patient called to report he was re-started on his warfarin 2 weeks ago (~9/13). He resumed his previous home dose. Recommended INR follow-up this week, however patient reports he is unable to come to [facility] and [facility] clinic is currently closed. Advised patient to get lab INR drawn at [facility] clinic, however he says that he cannot walk into clinic and needs the pharmacist/lab to come to his car to drawn INR. Patient reports he is going to see Dr. Noelle Pelaez soon. Tried to coordinate appointments but patient unwilling. Advised patient that it is very important to monitor INR given recent GI bleed. Patient reports he was stable previously and does not see the need to get INR drawn this soon anyway. He said he will think about options and call back with plan. Will call patient back in ~1-2 weeks to follow-up and schedule.      Leticia Hallman, PharmD, [facility]  [facility] Medication Management Clinic  [place]: [phone]  [place]: [phone]  9/27/2021 9:26 AM

## 2021-10-04 NOTE — TELEPHONE ENCOUNTER
Called patient again to discuss monitoring options. Patient was actually discharged from the clinic previously due to being off the medication. Will need new referral if patient decides to resume care at the clinic. Previously patient reported difficulty coming to appointments. LVM explaining he will require close INR monitoring given GI bleed. Could consider [facility] lab, or possibly restarting home health services. Per cardiology note, Goal INR can be 1.8 -2.2. Asked patient to call back.      Radha Harkins, PharmD, BCPS  [facility] Medication Management Clinic  [place]: [phone]  [place]: [phone]  10/4/2021 9:56 AM

## 2021-10-13 NOTE — TELEPHONE ENCOUNTER
Patient agreeable to come to [facility] clinic for INR check as he started warfarin several weeks ago. Consult was originally closed due to patient being off of medication, but patient would like to continue with clinic now that it is re-started. Will send new referral to Dr. Milagro Jackson.      Blanca Michael, PharmD, BCPS  [facility] Medication Management Clinic  [place]: [phone]  [place]: [phone]  10/13/2021 1:15 PM

## 2021-10-18 ENCOUNTER — ANTI-COAG VISIT (OUTPATIENT)
Dept: PHARMACY | Age: 76
End: 2021-10-18
Payer: MEDICARE

## 2021-10-18 ENCOUNTER — TELEPHONE (OUTPATIENT)
Dept: PHARMACY | Age: 76
End: 2021-10-18

## 2021-10-18 DIAGNOSIS — I48.91 ATRIAL FIBRILLATION, UNSPECIFIED TYPE (HCC): Primary | ICD-10-CM

## 2021-10-18 LAB — INTERNATIONAL NORMALIZATION RATIO, POC: 1.5

## 2021-10-18 PROCEDURE — 85610 PROTHROMBIN TIME: CPT | Performed by: PHARMACIST

## 2021-10-18 PROCEDURE — 99211 OFF/OP EST MAY X REQ PHY/QHP: CPT | Performed by: PHARMACIST

## 2021-10-18 NOTE — PROGRESS NOTES
ANTICOAGULATION SERVICE    Gracy Hernandez is a 68 y.o. male with PMHx significant for A-fib, HTN, HLD who presents to drive thru clinic 86/05/8705 for anticoagulation monitoring and adjustment.     Anticoagulation Indication(s):  Afib    Referring Physician:  Dr. Judith See  Goal INR Range:  1.8-2.2, but 1.5-2.5 acceptable as patient refuses frequent INR monitoring  Duration of Anticoagulation Therapy:  Indefinite  Time of day dose taken:  PM  Product patient has at home:  warfarin 5 mg (peach)    [id number] Score for Atrial Fibrillation Stroke Risk   Risk   Factors  Component Value   C CHF No 0   H HTN Yes 1   A2 Age >= 76 Yes,  (77 y.o.) 2   D DM No 0   S2 Prior Stroke/TIA No 0   V Vascular Disease No 0   A Age 74-69 No,  (77 y.o.) 0   Sc Sex male 0    [id number]  Score  3   Score last updated 1/9/20 9:42 AM    INR Summary                            Warfarin regimen (mg)  Date INR   A/P    Sun Mon Tue Wed Thu Fri Sat Mg/wk  10/18 1.5 Below goal, continue  5 5 5 5 5 5 5 35  2/2021-9/2021 OFF WARFARIN d/t rectal bleeding   1/19 1.8 At goal, no change  5 5 5 5 5 5 5 35  12/1 2.6 Above goal, continue  5 5 5 5 5 5 5 35  10/13 2.3 At goal, no change  5 5 5 5 5 5 5 35  9/1 2.5 Above goal, continue  5 5 5 5 5 5 5 35  7/21 2.2 At goal, no change  5 5 5 5 5 5 5 35  5/12 2.6 Above goal, decrease  5 5 5 5 5 5 5 35  2/19 2.5 At goal, no change  5 5 5 5 5 7.5 5 37.5  1/9 2.0 At goal, no change  5 5 5 5 5 7.5 5 37.5  10/9 2.7 Above goal, reduce x 1 5 5 5 5/7.5 5 5 5 37.5  7/31 2.2 At goal, no change  5 5 5 7.5 5 5 5 37.5  6/19 1.9 At goal, no change  5 5 5 7.5 5 5 5 37.5  5/8 2.0 At goal, no change  5 5 5 7.5 5 5 5 37.5  4/3 1.7 At goal, no change  5 5 5 7.5 5 5 5 37.5  2/27 2.4 At goal, no change  5 5 5 7.5 5 5 5 37.5   1/25 2.7 Above goal, continue  5 5 5 7.5 5 5 5 37.5   12/19 1.6 At goal (off warfarin)  5 5 5 7.5 5 5 5 37.5  11/14 2.4 At goal, no change  5 5 5 7.5 5 5 5 37.5  10/3 1.5 At goal, no per day   Upcoming surgeries or procedures -Patient declined to consider Watchman procedure as of now  -Patient decided against DCCV due to need for weekly INR checks     Assessment/Plan:  Patient's INR was subtherapeutic today (1.5) based on INR goal of 1.8-2.2 per cardiology. Patient reports he believes he missed a dose last week, which is likely why INR on lower end of goal today. Since INR goal range is narrow, and patient refuses to come to clinic more than once monthly due to difficulty walking and cost burden, will consider INR of 1.5-2.5 acceptable. Patient is often noncompliant with clinic visits and warfarin dosing instructions. He self-doses warfarin and often holds it without being instructed to do so by a healthcare provider. He denies any recent medication or diet changes, or bleeding. Patient was instructed to continue warfarin 5 mg daily and resume normal vit k intake. Repeat INR in 6 weeks (patient refuses to come any sooner due to [place] pandemic). He will call to schedule follow-up appointment. Patient was instructed to maintain consistent vitamin K intake and call with any bleeding, medication changes, or fever/vomiting/diarrhea. Of note, patient has difficulty walking due to having polio as a child. It is becoming increasingly difficult for him to come to clinic for INR monitoring. Have discussed alternative options with patient on multiple occasions. He refuses switch to DOAC due to high cost.  Also suggested that patient look into home INR monitoring devices and provided information, but he has not done so. Also discussed possibility of home care visits and suggested that patient discuss with PCP, which he has not done. Patient understands dosing directions and information discussed. Dosing schedule and follow up appointment given to patient. Progress note routed to referring physician's office.   Patient acknowledges working in consult agreement with pharmacist as referred by his/her physician. Next Clinic Appointment:  6 weeks     Please call Kiah at [phone] with any questions. Thanks!   Kris Cooper, PharmD, BCPS  [facility] Medication Management [facility]  [place]: [phone]  [place]: [phone]  10/18/2021 9:35 AM    For Pharmacy Admin Tracking Only     Intervention Detail: Adherence Monitorin   Total # of Interventions Recommended: 1   Total # of Interventions Accepted: 1   Time Spent (min): 15

## 2021-11-03 ENCOUNTER — TELEPHONE (OUTPATIENT)
Dept: INTERNAL MEDICINE CLINIC | Age: 76
End: 2021-11-03

## 2021-11-03 NOTE — TELEPHONE ENCOUNTER
----- Message from Henry Sanders sent at 11/2/2021 11:50 AM EDT -----  Subject: Message to Provider    QUESTIONS  Information for Provider? pt is wanting to know if lab work is needed   before appt   ---------------------------------------------------------------------------  --------------  [address]  What is the best way for the office to contact you? OK to leave message on   voicemail  Preferred Call Back Phone Number? [phone]  ---------------------------------------------------------------------------  --------------  SCRIPT ANSWERS  Relationship to Patient?  Self

## 2021-11-03 NOTE — TELEPHONE ENCOUNTER
So pt is NOT due for AWV-had one 2/2021 and I checked the billing to confirm- was scheduled as one in 11/2020 but it was not billed as one- so he DOES need a visit for f/u since not seen since 2/2021 but then will need an AWV in feb, march or so- we can wait and do labs before that visit-no need for labs now before next weeks visit

## 2021-11-03 NOTE — TELEPHONE ENCOUNTER
LVM with pt call back asap to schedule INR check.      Arthur Arita, PharmD, [facility]  Medication Management Clinic   [address] Ph: [phone]  [address] Ph: [phone]  11/3/2021 4:43 PM

## 2021-11-09 NOTE — TELEPHONE ENCOUNTER
Pt scheduled 11/26    Karen Han, PharmD, [facility]  Medication Management Clinic   [facility] [address] Ph: [phone]  [facility] Ph: [phone]  11/9/2021 9:05 AM

## 2021-11-15 ENCOUNTER — TELEPHONE (OUTPATIENT)
Dept: PHARMACY | Age: 76
End: 2021-11-15

## 2021-11-15 NOTE — TELEPHONE ENCOUNTER
Scheduled patient at Reta Perez on 12/1 at [facility], PharmD, [address] [facility] Medication Management [address] [place] [address] [address]: [address] [place] [address] Street: [phone]  11/15/2021 4:38 PM

## 2021-11-15 NOTE — TELEPHONE ENCOUNTER
Pt LVM to cancel INR check on 11/16/21. States he would rather schedule at [facility] since we no longer have curbside INRs. LVM with patient to schedule visit for [facility].  Last INR 10/18 and was slightly below goal.     Kristel Faulkner, PharmD, [facility]  Medication Management Clinic   [address] [address] Ph: [phone]  [facility] Ph: [phone]  11/15/2021 9:24 AM

## 2021-11-29 DIAGNOSIS — E78.5 HYPERLIPIDEMIA, UNSPECIFIED HYPERLIPIDEMIA TYPE: ICD-10-CM

## 2021-11-29 DIAGNOSIS — M62.81 POST-POLIO MUSCLE WEAKNESS: ICD-10-CM

## 2021-11-29 DIAGNOSIS — K04.7 TOOTH ABSCESS: ICD-10-CM

## 2021-11-29 DIAGNOSIS — I48.91 ATRIAL FIBRILLATION, UNSPECIFIED TYPE (HCC): ICD-10-CM

## 2021-11-29 DIAGNOSIS — Z12.5 PROSTATE CANCER SCREENING: ICD-10-CM

## 2021-11-29 DIAGNOSIS — Z00.00 WELL ADULT EXAM: ICD-10-CM

## 2021-11-29 DIAGNOSIS — E07.9 THYROID DISEASE: ICD-10-CM

## 2021-11-29 DIAGNOSIS — Z12.11 COLON CANCER SCREENING: ICD-10-CM

## 2021-11-29 DIAGNOSIS — B91 POST-POLIO MUSCLE WEAKNESS: ICD-10-CM

## 2021-11-29 DIAGNOSIS — N20.0 KIDNEY STONES: ICD-10-CM

## 2021-11-29 DIAGNOSIS — K59.00 CONSTIPATION, UNSPECIFIED CONSTIPATION TYPE: ICD-10-CM

## 2021-11-29 RX ORDER — ATENOLOL 50 MG/1
TABLET ORAL
Qty: 90 TABLET | Refills: 0 | Status: SHIPPED | OUTPATIENT
Start: 2021-11-29 | End: 2022-02-28 | Stop reason: SDUPTHER

## 2021-11-29 RX ORDER — LEVOTHYROXINE SODIUM 0.12 MG/1
TABLET ORAL
Qty: 90 TABLET | Refills: 0 | Status: SHIPPED | OUTPATIENT
Start: 2021-11-29 | End: 2022-02-28 | Stop reason: SDUPTHER

## 2021-11-29 NOTE — TELEPHONE ENCOUNTER
Pharmacy  called in requesting refill for the following medication:      atenolol (TENORMIN) 50 MG tablet      levothyroxine (SYNTHROID) 125 MCG tablet     [facility] [address], [address] - [address] [phone]      PLs advise.

## 2021-12-01 ENCOUNTER — ANTI-COAG VISIT (OUTPATIENT)
Dept: PHARMACY | Age: 76
End: 2021-12-01
Payer: MEDICARE

## 2021-12-01 DIAGNOSIS — I48.91 ATRIAL FIBRILLATION, UNSPECIFIED TYPE (HCC): Primary | ICD-10-CM

## 2021-12-01 LAB — INTERNATIONAL NORMALIZATION RATIO, POC: 1.5

## 2021-12-01 PROCEDURE — 85610 PROTHROMBIN TIME: CPT

## 2021-12-01 PROCEDURE — 99211 OFF/OP EST MAY X REQ PHY/QHP: CPT

## 2021-12-01 NOTE — PROGRESS NOTES
ANTICOAGULATION SERVICE    Ronna Guillaume is a 68 y.o. male with PMHx significant for A-fib, HTN, HLD who presents to drive thru clinic 16/3/1213 for anticoagulation monitoring and adjustment.     Anticoagulation Indication(s):  Afib    Referring Physician:  Dr. Jesica Patel  Goal INR Range:  1.8-2.2, but 1.5-2.5 acceptable as patient refuses frequent INR monitoring  Duration of Anticoagulation Therapy:  Indefinite  Time of day dose taken:  PM  Product patient has at home:  warfarin 5 mg (peach)    [id number] Score for Atrial Fibrillation Stroke Risk   Risk   Factors  Component Value   C CHF No 0   H HTN Yes 1   A2 Age >= 76 Yes,  (77 y.o.) 2   D DM No 0   S2 Prior Stroke/TIA No 0   V Vascular Disease No 0   A Age 74-69 No,  (77 y.o.) 0   Sc Sex male 0    [id number]  Score  3   Score last updated 1/9/20 9:42 AM    INR Summary                            Warfarin regimen (mg)  Date INR   A/P    Sun Mon Tue Wed Thu Fri Sat Mg/wk  12/1 1.5 Below goal, continue  5 5 5 5 5 5 5 35  10/18 1.5 Below goal, continue  5 5 5 5 5 5 5 35  2/2021-9/2021 OFF WARFARIN d/t rectal bleeding   1/19 1.8 At goal, no change  5 5 5 5 5 5 5 35  12/1 2.6 Above goal, continue  5 5 5 5 5 5 5 35  10/13 2.3 At goal, no change  5 5 5 5 5 5 5 35  9/1 2.5 Above goal, continue  5 5 5 5 5 5 5 35  7/21 2.2 At goal, no change  5 5 5 5 5 5 5 35  5/12 2.6 Above goal, decrease  5 5 5 5 5 5 5 35  2/19 2.5 At goal, no change  5 5 5 5 5 7.5 5 37.5  1/9 2.0 At goal, no change  5 5 5 5 5 7.5 5 37.5  10/9 2.7 Above goal, reduce x 1 5 5 5 5/7.5 5 5 5 37.5  7/31 2.2 At goal, no change  5 5 5 7.5 5 5 5 37.5  6/19 1.9 At goal, no change  5 5 5 7.5 5 5 5 37.5  5/8 2.0 At goal, no change  5 5 5 7.5 5 5 5 37.5  4/3 1.7 At goal, no change  5 5 5 7.5 5 5 5 37.5  2/27 2.4 At goal, no change  5 5 5 7.5 5 5 5 37.5   1/25 2.7 Above goal, continue  5 5 5 7.5 5 5 5 37.5   12/19 1.6 At goal (off warfarin)  5 5 5 7.5 5 5 5 37.5  11/14 2.4 At goal, no change  5 5 5 7.5 5 5 5 37.5  10/3 1.5 At goal, no change  5 5 5 7.5 5 5 5 37.5  9/5 2.4 At goal, no change  5 5 5 7.5 5 5 5 37.5  8/22 1.4 Below goal, increase  5 5 5 7.5 5 5 5 37.5  7/26 1.9 At goal, no change  5 5 5 5 5 5 5 35  7/2 1.3 Below goal, increase  5 5 5 5 5 5 5 35  6/20 1.2 Below goal, increase  2.5 5 2.5 5 2.5 5 5 27.5   6/7 1.2 Below goal, increase  2.5 5 2.5 2.5 5 2.5 2.5 22.5  6/2 --- Resume warfarin  2.5 2.5 2.5 2.5 2.5 2.5 2.5 17.5  Mar-May  Patient self-d/c'd warfarin  2/28 2.5 At goal, no change  5 7.5 5 5 5 5 5 37.5  1/17 2.1 At goal, no change  5 7.5 5 5 5 5 5 37.5  12/13 2.4 At goal, no change  5 7.5 5 5 5 5 5 37.5  11/15 2.2 At goal, no change  5 7.5 5 5 5 5 5 37.5  10/18 2.8 At goal, no change  5 7.5 5 5 5 5 5 37.5      Patient History:  Recent hospitalizations/HC visits 3/8/21: Sigmoidoscopy and anal fistulotomy d/t rectal bleeding  11/19 [facility] surgery for basal cell carcinoma of forehead    Recent medication changes None reported today   Medications taken regularly that may interact with warfarin or alter INR ASA 81 mg, levothyroxine   Warfarin dose taken as prescribed -Held warfarin 1/4-1/10 for single tooth extraction, despite recommendation to only hold max 2-3 days for low bleeding risk procedure. -H/o noncompliance with warfarin dosing instructions  -Does not use pillbox   Signs/symptoms of bleeding H/o bleeding anal fistula. Warfarin held 2/2021-9/2021. Vitamin K intake -Normally has ~1-2 serving of green, leafy vegetables per week (usually broccoli once per week), has iceburg salad 3-4x per week  -12/1: no greens lately   -Drinks 1 glass of V8 juice occassionally   Recent vomiting/diarrhea/fever, changes in weight or activity level Patient states he is very tired all of the time and has to stop residential between the parking lot and clinic office because he runs out of energy. This is an ongoing problem.    Tobacco or alcohol use -Patient reports quitting smoking 47 years office. Patient acknowledges working in consult agreement with pharmacist as referred by his/her physician. Next Clinic Appointment:     Please call Kiah at [phone] with any questions.         For Pharmacy Admin Tracking Only     Intervention Detail: Adherence Monitorin   Total # of Interventions Recommended: 1   Total # of Interventions Accepted: 1   Time Spent (min): 15

## 2021-12-09 ENCOUNTER — OFFICE VISIT (OUTPATIENT)
Dept: CARDIOLOGY CLINIC | Age: 76
End: 2021-12-09
Payer: MEDICARE

## 2021-12-09 VITALS
BODY MASS INDEX: 32.08 KG/M2 | SYSTOLIC BLOOD PRESSURE: 138 MMHG | HEART RATE: 71 BPM | WEIGHT: 230 LBS | DIASTOLIC BLOOD PRESSURE: 80 MMHG

## 2021-12-09 DIAGNOSIS — E78.5 HYPERLIPIDEMIA, UNSPECIFIED HYPERLIPIDEMIA TYPE: ICD-10-CM

## 2021-12-09 DIAGNOSIS — I48.91 ATRIAL FIBRILLATION, UNSPECIFIED TYPE (HCC): ICD-10-CM

## 2021-12-09 DIAGNOSIS — I10 HTN (HYPERTENSION), BENIGN: Primary | ICD-10-CM

## 2021-12-09 PROCEDURE — 99214 OFFICE O/P EST MOD 30 MIN: CPT | Performed by: INTERNAL MEDICINE

## 2021-12-09 RX ORDER — VALSARTAN 80 MG/1
80 TABLET ORAL DAILY
Qty: 90 TABLET | Refills: 3 | Status: SHIPPED | OUTPATIENT
Start: 2021-12-09 | End: 2022-06-14 | Stop reason: SINTOL

## 2021-12-09 NOTE — PROGRESS NOTES
Subjective:      Patient ID: Lizzy Lux is a 68 y.o. male. CC:  SOB at times. Chronic af.  HTN.  HLP. 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 Denies chest pain,  syncope, orthopnea, palpitations,   Had polio as a kid with lle weakness. He has no chest pain. He has anal fistula bleeding and stopped warfarin with considerable healing of the site. Needs another surgery. Allergies   Allergen Reactions    Naproxen Other (See Comments)     Welts and nerve pain        Social History     Socioeconomic History    Marital status: Single     Spouse name: Not on file    Number of children: Not on file    Years of education: Not on file    Highest education level: Not on file   Occupational History    Not on file   Tobacco Use    Smoking status: Former Smoker     Packs/day: 0.00     Years: 10.00     Pack years: 0.00     Quit date: 1970     Years since quittin.2    Smokeless tobacco: Never Used    Tobacco comment: smoked 50 years ago   Vaping Use    Vaping Use: Never used   Substance and Sexual Activity    Alcohol use: Yes     Alcohol/week: 1.0 standard drink     Types: 1 Cans of beer per week     Comment: beer or two every evening    Drug use: No    Sexual activity: Not on file   Other Topics Concern    Not on file   Social History Narrative    Not on file     Social Determinants of Health     Financial Resource Strain:     Difficulty of Paying Living Expenses: Not on file   Food Insecurity:     Worried About Running Out of Food in the Last Year: Not on file    Daljit of Food in the Last Year: Not on file   Transportation Needs:     Lack of Transportation (Medical): Not on file    Lack of Transportation (Non-Medical):  Not on file   Physical Activity:     Days of Exercise per Week: Not on file    Minutes of Exercise per Session: Not on file   Stress:     Feeling of Stress : Not on file   Social Connections:     Frequency of Communication with Friends and Family: Not on file    Frequency of Social Gatherings with Friends and Family: Not on file    Attends Restorationism Services: Not on file    Active Member of Clubs or Organizations: Not on file    Attends Club or Organization Meetings: Not on file    Marital Status: Not on file   Intimate Partner Violence:     Fear of Current or Ex-Partner: Not on file    Emotionally Abused: Not on file    Physically Abused: Not on file    Sexually Abused: Not on file   Housing Stability:     Unable to Pay for Housing in the Last Year: Not on file    Number of [place] in the Last Year: Not on file    Unstable Housing in the Last Year: Not on file        Patient has a family history is not on file. Patient  has a past medical history of Atrial fibrillation ([facility] [address] Cancer ([facility] [address] Epididymitis, Grave's disease, Graves disease, Hyperlipidemia, Hypertension, Irregular heart rate, and Polio. Current Outpatient Medications   Medication Sig Dispense Refill    atenolol (TENORMIN) 50 MG tablet TAKE 1 TABLET BY MOUTH ONCE DAILY . APPOINTMENT REQUIRED FOR FUTURE REFILLS 90 tablet 0    levothyroxine (EUTHYROX) 125 MCG tablet TAKE 1 TABLET BY MOUTH ONCE DAILY 90 tablet 0    warfarin (COUMADIN) 5 MG tablet TAKE 1 TABLET BY MOUTH ONCE DAILY AND 1 AND 1/2 TABLETS ON WEDNESDAY 100 tablet 3    Multiple Vitamins-Minerals (MULTIVITAMIN MEN 50+ PO) Take by mouth daily      simvastatin (ZOCOR) 80 MG tablet TAKE ONE TABLET BY MOUTH ONCE DAILY AT NIGHT 90 tablet 3    vitamin D (CHOLECALCIFEROL) 1000 UNIT TABS tablet Take 2 tablets by mouth daily 60 tablet      No current facility-administered medications for this visit. Vitals  Weight: 230 lb (104.3 kg)  Blood Pressure: BP: (138)/(80)    Pulse: 71           Review of Systems   Constitutional: Negative. HENT: Negative. Eyes: Negative. Cardiovascular: Negative. Gastrointestinal: Negative. Endocrine: Negative.     Genitourinary: Negative. Musculoskeletal: Negative. Skin: Negative. Allergic/Immunologic: Negative. Neurological: Negative. Hematological: Negative. Psychiatric/Behavioral: Negative. Objective:   Physical Exam   Constitutional: He is oriented to person, place, and time. He appears well-developed and well-nourished. HENT:   Head: Normocephalic and atraumatic. Eyes: EOM are normal. Pupils are equal, round, and reactive to light. Neck: Normal range of motion. Neck supple. Cardiovascular: Normal rate and regular rhythm. Exam reveals no friction rub. No murmur heard. Pulmonary/Chest: Effort normal and breath sounds normal.   Abdominal: Soft. Bowel sounds are normal.   Musculoskeletal: Normal range of motion. He exhibits no edema or deformity. Neurological: He is alert and oriented to person, place, and time. Skin: Skin is warm and dry. Psychiatric: He has a normal mood and affect. His behavior is normal. Thought content normal.       Assessment:      [id number] Score for Atrial Fibrillation Stroke Risk   Risk   Factors  Component Value   C CHF No 0   H HTN Yes 1   A2 Age >= 76 Yes,  (77 y.o.) 2   D DM No 0   S2 Prior Stroke/TIA No 0   V Vascular Disease No 0   A Age 74-69 No,  (77 y.o.) 0   Sc Sex male 0    [id number]  Score  3   Score last updated 60/9/79 71:26 AM    Click here for a link to the UpToDate guideline \"Atrial Fibrillation: Anticoagulation therapy to prevent embolization    Disclaimer: Risk Score calculation is dependent on accuracy of patient problem list and past encounter diagnosis. Diagnosis Orders   1. HTN (hypertension), benign     2. Hyperlipidemia, unspecified hyperlipidemia type     3. Atrial fibrillation, unspecified type ([facility] [address]             Plan:      History of graves disease and had orbital decompression with resultant double vision. Had full polio. Has AF. ECG with late transition. off warfarin because of severe rectal bleeding. Off warfarin for 3 months. HTN:  .  Takes synthroid. SOB:  Seems controlled but will add diovan 80 mg daily. Hypercholesterolemia:    controllled with simva. Snores occasionally but doesn't exhibit all KATARINA. Needs another anal fistula surgery and is off warfarin. Doesn't want watchman. Goal INR can be 1.8 -2.2. STILL OFF WARFARIN FOR C AF.

## 2022-01-12 ENCOUNTER — ANTI-COAG VISIT (OUTPATIENT)
Dept: PHARMACY | Age: 77
End: 2022-01-12
Payer: MEDICARE

## 2022-01-12 DIAGNOSIS — I48.91 ATRIAL FIBRILLATION, UNSPECIFIED TYPE (HCC): Primary | ICD-10-CM

## 2022-01-12 LAB — INTERNATIONAL NORMALIZATION RATIO, POC: 1.8

## 2022-01-12 PROCEDURE — 99211 OFF/OP EST MAY X REQ PHY/QHP: CPT

## 2022-01-12 PROCEDURE — 85610 PROTHROMBIN TIME: CPT

## 2022-01-12 NOTE — PROGRESS NOTES
ANTICOAGULATION SERVICE    Roselia Cortez is a 68 y.o. male with PMHx significant for A-fib, HTN, HLD who presents to drive thru clinic 8/64/9695 for anticoagulation monitoring and adjustment.     Anticoagulation Indication(s):  Afib    Referring Physician:  Dr. Namrata East  Goal INR Range:  1.8-2.2, but 1.5-2.5 acceptable as patient refuses frequent INR monitoring  Duration of Anticoagulation Therapy:  Indefinite  Time of day dose taken:  PM  Product patient has at home:  warfarin 5 mg (peach)    [id number] Score for Atrial Fibrillation Stroke Risk   Risk   Factors  Component Value   C CHF No 0   H HTN Yes 1   A2 Age >= 76 Yes,  (77 y.o.) 2   D DM No 0   S2 Prior Stroke/TIA No 0   V Vascular Disease No 0   A Age 74-69 No,  (77 y.o.) 0   Sc Sex male 0    [id number]  Score  3   Score last updated 1/9/20 9:42 AM    INR Summary                            Warfarin regimen (mg)  Date INR   A/P    Sun Mon Tue Wed Thu Fri Sat Mg/wk  1/12 1.8 At goal, no change  5 5 5 5 5 5 5 35   12/1 1.5 Below goal, continue  5 5 5 5 5 5 5 35  10/18 1.5 Below goal, continue  5 5 5 5 5 5 5 35  2/2021-9/2021 OFF WARFARIN d/t rectal bleeding   1/19 1.8 At goal, no change  5 5 5 5 5 5 5 35  12/1 2.6 Above goal, continue  5 5 5 5 5 5 5 35  10/13 2.3 At goal, no change  5 5 5 5 5 5 5 35  9/1 2.5 Above goal, continue  5 5 5 5 5 5 5 35  7/21 2.2 At goal, no change  5 5 5 5 5 5 5 35  5/12 2.6 Above goal, decrease  5 5 5 5 5 5 5 35  2/19 2.5 At goal, no change  5 5 5 5 5 7.5 5 37.5  1/9 2.0 At goal, no change  5 5 5 5 5 7.5 5 37.5  10/9 2.7 Above goal, reduce x 1 5 5 5 5/7.5 5 5 5 37.5  7/31 2.2 At goal, no change  5 5 5 7.5 5 5 5 37.5  6/19 1.9 At goal, no change  5 5 5 7.5 5 5 5 37.5  5/8 2.0 At goal, no change  5 5 5 7.5 5 5 5 37.5  4/3 1.7 At goal, no change  5 5 5 7.5 5 5 5 37.5  2/27 2.4 At goal, no change  5 5 5 7.5 5 5 5 37.5   1/25 2.7 Above goal, continue  5 5 5 7.5 5 5 5 37.5   12/19 1.6 At goal (off warfarin)  5 5 5 7.5 5 5 5 37.5  11/14 2.4 At goal, no change  5 5 5 7.5 5 5 5 37.5  10/3 1.5 At goal, no change  5 5 5 7.5 5 5 5 37.5  9/5 2.4 At goal, no change  5 5 5 7.5 5 5 5 37.5  8/22 1.4 Below goal, increase  5 5 5 7.5 5 5 5 37.5  7/26 1.9 At goal, no change  5 5 5 5 5 5 5 35  7/2 1.3 Below goal, increase  5 5 5 5 5 5 5 35  6/20 1.2 Below goal, increase  2.5 5 2.5 5 2.5 5 5 27.5   6/7 1.2 Below goal, increase  2.5 5 2.5 2.5 5 2.5 2.5 22.5  6/2 --- Resume warfarin  2.5 2.5 2.5 2.5 2.5 2.5 2.5 17.5  Mar-May  Patient self-d/c'd warfarin  2/28 2.5 At goal, no change  5 7.5 5 5 5 5 5 37.5  1/17 2.1 At goal, no change  5 7.5 5 5 5 5 5 37.5  12/13 2.4 At goal, no change  5 7.5 5 5 5 5 5 37.5  11/15 2.2 At goal, no change  5 7.5 5 5 5 5 5 37.5  10/18 2.8 At goal, no change  5 7.5 5 5 5 5 5 37.5      Patient History:  Recent hospitalizations/HC visits 3/8/21: Sigmoidoscopy and anal fistulotomy d/t rectal bleeding  11/19 MOHS surgery for basal cell carcinoma of forehead    Recent medication changes 1/12/22: started valsartan    Medications taken regularly that may interact with warfarin or alter INR ASA 81 mg, levothyroxine   Warfarin dose taken as prescribed -Held warfarin 1/4-1/10 for single tooth extraction, despite recommendation to only hold max 2-3 days for low bleeding risk procedure. -H/o noncompliance with warfarin dosing instructions  -Does not use pillbox   Signs/symptoms of bleeding H/o bleeding anal fistula. Warfarin held 2/2021-9/2021. Vitamin K intake -Normally has ~1-2 serving of green, leafy vegetables per week (usually broccoli once per week), has iceburg salad 3-4x per week  -12/1: no greens lately   -Drinks 1 glass of V8 juice occassionally   Recent vomiting/diarrhea/fever, changes in weight or activity level Patient states he is very tired all of the time and has to stop care home between the parking lot and clinic office because he runs out of energy. This is an ongoing problem.    Tobacco or alcohol use -Patient reports quitting smoking 47 years ago  -Patient reports having 1-2 beers or shot of whiskey per day   Upcoming surgeries or procedures -Patient declined to consider Watchman procedure as of now  -Patient decided against DCCV due to need for weekly INR checks     Assessment/Plan:  Patient's INR was therapeutic today (1.8) based on INR goal of 1.8-2.2 per cardiology. Patient denies any missed or extra doses, change in diet, or signs and symptoms of bleeding. Since INR goal range is narrow, and patient refuses to come to clinic more than once monthly due to difficulty walking and cost burden, will consider INR of 1.5-2.5 acceptable. Patient is often noncompliant with clinic visits and warfarin dosing instructions. He self-doses warfarin and often holds it without being instructed to do so by a healthcare provider. He denies any recent medication or diet changes, or bleeding. Patient was instructed to continue warfarin 5 mg daily and resume normal vit k intake. Repeat INR in 6 weeks. Patient does not want to come in any sooner and offered a dose change as an alternate to a more frequent appointment. Advised him to not alter his prescribed dosing. Patient was instructed to maintain consistent vitamin K intake and call with any bleeding, medication changes, or fever/vomiting/diarrhea. Of note, patient has difficulty walking due to having polio as a child. It is becoming increasingly difficult for him to come to clinic for INR monitoring. Have discussed alternative options with patient on multiple occasions. He refuses switch to DOAC due to high cost.  Also suggested that patient look into home INR monitoring devices and provided information, but he has not done so. Also discussed possibility of home care visits and suggested that patient discuss with PCP, which he has not done. Patient understands dosing directions and information discussed.   Dosing schedule and follow up appointment given to patient. Progress note routed to referring physician's office. Patient acknowledges working in consult agreement with pharmacist as referred by his/her physician. Next Clinic Appointment: 2/23    Please call [facility] at [phone] with any questions. Thanks!   Mony Bryson, PharmD  PGY1 Pharmacy Resident  [facility]  1/12/2022 10:00 AM    For Pharmacy Admin Tracking Only   Total # of Interventions Recommended: 0   Total # of Interventions Accepted: 0   Time Spent (min): 15

## 2022-02-28 DIAGNOSIS — I10 PRIMARY HYPERTENSION: Primary | ICD-10-CM

## 2022-02-28 DIAGNOSIS — R73.9 HYPERGLYCEMIA: ICD-10-CM

## 2022-02-28 DIAGNOSIS — Z12.5 PROSTATE CANCER SCREENING: ICD-10-CM

## 2022-02-28 DIAGNOSIS — E78.5 HYPERLIPIDEMIA, UNSPECIFIED HYPERLIPIDEMIA TYPE: ICD-10-CM

## 2022-02-28 DIAGNOSIS — K60.3 FISTULA-IN-ANO: ICD-10-CM

## 2022-02-28 DIAGNOSIS — Z00.00 WELL ADULT EXAM: ICD-10-CM

## 2022-02-28 DIAGNOSIS — K59.01 SLOW TRANSIT CONSTIPATION: ICD-10-CM

## 2022-02-28 DIAGNOSIS — K59.00 CONSTIPATION, UNSPECIFIED CONSTIPATION TYPE: ICD-10-CM

## 2022-02-28 DIAGNOSIS — N20.0 KIDNEY STONES: ICD-10-CM

## 2022-02-28 DIAGNOSIS — M62.81 POST-POLIO MUSCLE WEAKNESS: ICD-10-CM

## 2022-02-28 DIAGNOSIS — K04.7 TOOTH ABSCESS: ICD-10-CM

## 2022-02-28 DIAGNOSIS — E07.9 THYROID DISEASE: ICD-10-CM

## 2022-02-28 DIAGNOSIS — Z12.11 COLON CANCER SCREENING: ICD-10-CM

## 2022-02-28 DIAGNOSIS — B91 POST-POLIO MUSCLE WEAKNESS: ICD-10-CM

## 2022-02-28 DIAGNOSIS — I48.91 ATRIAL FIBRILLATION, UNSPECIFIED TYPE (HCC): ICD-10-CM

## 2022-02-28 RX ORDER — ATENOLOL 50 MG/1
TABLET ORAL
Qty: 30 TABLET | Refills: 0 | Status: SHIPPED | OUTPATIENT
Start: 2022-02-28 | End: 2022-03-11 | Stop reason: SDUPTHER

## 2022-02-28 RX ORDER — LEVOTHYROXINE SODIUM 0.12 MG/1
TABLET ORAL
Qty: 30 TABLET | Refills: 0 | Status: SHIPPED | OUTPATIENT
Start: 2022-02-28 | End: 2022-03-11 | Stop reason: SDUPTHER

## 2022-02-28 NOTE — TELEPHONE ENCOUNTER
Please refill     atenolol (TENORMIN) 50 MG tablet         levothyroxine (EUTHYROX) 125 MCG tablet           [facility] [address], [address] Isis Powell [phone] [address] [facility] [address]   Phone:  [phone]  Fax:  [phone]      Please advise and call

## 2022-02-28 NOTE — TELEPHONE ENCOUNTER
Pt is overdue for his AWV-have him make an appt-try to do labs ahead-repeat all ordered 2/11/2021except vit d, iron and b12 not needed

## 2022-03-02 ENCOUNTER — ANTI-COAG VISIT (OUTPATIENT)
Dept: PHARMACY | Age: 77
End: 2022-03-02
Payer: MEDICARE

## 2022-03-02 DIAGNOSIS — I48.91 ATRIAL FIBRILLATION, UNSPECIFIED TYPE (HCC): Primary | ICD-10-CM

## 2022-03-02 DIAGNOSIS — E78.5 HYPERLIPIDEMIA, UNSPECIFIED HYPERLIPIDEMIA TYPE: ICD-10-CM

## 2022-03-02 DIAGNOSIS — R73.9 HYPERGLYCEMIA: ICD-10-CM

## 2022-03-02 DIAGNOSIS — K60.3 FISTULA-IN-ANO: ICD-10-CM

## 2022-03-02 DIAGNOSIS — K59.01 SLOW TRANSIT CONSTIPATION: ICD-10-CM

## 2022-03-02 DIAGNOSIS — I10 PRIMARY HYPERTENSION: ICD-10-CM

## 2022-03-02 LAB
A/G RATIO: 1.5 (ref 1.1–2.2)
ALBUMIN SERPL-MCNC: 4.2 G/DL (ref 3.4–5)
ALP BLD-CCNC: 96 U/L (ref 40–129)
ALT SERPL-CCNC: 21 U/L (ref 10–40)
ANION GAP SERPL CALCULATED.3IONS-SCNC: 12 MMOL/L (ref 3–16)
AST SERPL-CCNC: 22 U/L (ref 15–37)
BASOPHILS ABSOLUTE: 0.1 K/UL (ref 0–0.2)
BASOPHILS RELATIVE PERCENT: 0.9 %
BILIRUB SERPL-MCNC: 0.4 MG/DL (ref 0–1)
BUN BLDV-MCNC: 16 MG/DL (ref 7–20)
CALCIUM SERPL-MCNC: 9.3 MG/DL (ref 8.3–10.6)
CHLORIDE BLD-SCNC: 103 MMOL/L (ref 99–110)
CO2: 26 MMOL/L (ref 21–32)
CREAT SERPL-MCNC: 0.6 MG/DL (ref 0.8–1.3)
EOSINOPHILS ABSOLUTE: 0.2 K/UL (ref 0–0.6)
EOSINOPHILS RELATIVE PERCENT: 2.1 %
GFR AFRICAN AMERICAN: >60
GFR NON-AFRICAN AMERICAN: >60
GLUCOSE BLD-MCNC: 102 MG/DL (ref 70–99)
HCT VFR BLD CALC: 50.2 % (ref 40.5–52.5)
HEMOGLOBIN: 16.7 G/DL (ref 13.5–17.5)
INTERNATIONAL NORMALIZATION RATIO, POC: 1.9
LYMPHOCYTES ABSOLUTE: 2 K/UL (ref 1–5.1)
LYMPHOCYTES RELATIVE PERCENT: 23 %
MCH RBC QN AUTO: 31.6 PG (ref 26–34)
MCHC RBC AUTO-ENTMCNC: 33.2 G/DL (ref 31–36)
MCV RBC AUTO: 95 FL (ref 80–100)
MONOCYTES ABSOLUTE: 0.9 K/UL (ref 0–1.3)
MONOCYTES RELATIVE PERCENT: 10.5 %
NEUTROPHILS ABSOLUTE: 5.5 K/UL (ref 1.7–7.7)
NEUTROPHILS RELATIVE PERCENT: 63.5 %
PDW BLD-RTO: 13.8 % (ref 12.4–15.4)
PLATELET # BLD: 274 K/UL (ref 135–450)
PMV BLD AUTO: 9 FL (ref 5–10.5)
POTASSIUM SERPL-SCNC: 4.4 MMOL/L (ref 3.5–5.1)
RBC # BLD: 5.29 M/UL (ref 4.2–5.9)
SODIUM BLD-SCNC: 141 MMOL/L (ref 136–145)
T4 FREE: 1.6 NG/DL (ref 0.9–1.8)
TOTAL PROTEIN: 7 G/DL (ref 6.4–8.2)
TSH SERPL DL<=0.05 MIU/L-ACNC: 2.49 UIU/ML (ref 0.27–4.2)
WBC # BLD: 8.6 K/UL (ref 4–11)

## 2022-03-02 PROCEDURE — 99212 OFFICE O/P EST SF 10 MIN: CPT | Performed by: PHARMACIST

## 2022-03-02 PROCEDURE — 85610 PROTHROMBIN TIME: CPT | Performed by: PHARMACIST

## 2022-03-02 RX ORDER — WARFARIN SODIUM 5 MG/1
5 TABLET ORAL DAILY
Qty: 100 TABLET | Refills: 3 | Status: SHIPPED | OUTPATIENT
Start: 2022-03-02 | End: 2022-03-11 | Stop reason: SDUPTHER

## 2022-03-02 NOTE — PROGRESS NOTES
ANTICOAGULATION SERVICE    Bailey Dejesus is a 68 y.o. male with PMHx significant for A-fib, HTN, HLD who presents to drive thru clinic 1/9/2190 for anticoagulation monitoring and adjustment.     Anticoagulation Indication(s):  Afib    Referring Physician:  Dr. Cornelio Cerna  Goal INR Range:  1.8-2.2, but 1.5-2.5 acceptable as patient refuses frequent INR monitoring  Duration of Anticoagulation Therapy:  Indefinite  Time of day dose taken:  PM  Product patient has at home:  warfarin 5 mg (peach)    [id number] Score for Atrial Fibrillation Stroke Risk   Risk   Factors  Component Value   C CHF No 0   H HTN Yes 1   A2 Age >= 76 Yes,  (77 y.o.) 2   D DM No 0   S2 Prior Stroke/TIA No 0   V Vascular Disease No 0   A Age 74-69 No,  (77 y.o.) 0   Sc Sex male 0    [id number]  Score  3   Score last updated 1/9/20 9:42 AM    INR Summary                            Warfarin regimen (mg)  Date INR   A/P    Sun Mon Tue Wed Thu Fri Sat Mg/wk  3/2 1.9 At goal, no change  5 5 5 5 5 5 5 35  1/12 1.8 At goal, no change  5 5 5 5 5 5 5 35   12/1 1.5 Below goal, continue  5 5 5 5 5 5 5 35  10/18 1.5 Below goal, continue  5 5 5 5 5 5 5 35  2/2021-9/2021 OFF WARFARIN d/t rectal bleeding   1/19 1.8 At goal, no change  5 5 5 5 5 5 5 35  12/1 2.6 Above goal, continue  5 5 5 5 5 5 5 35  10/13 2.3 At goal, no change  5 5 5 5 5 5 5 35  9/1 2.5 Above goal, continue  5 5 5 5 5 5 5 35  7/21 2.2 At goal, no change  5 5 5 5 5 5 5 35  5/12 2.6 Above goal, decrease  5 5 5 5 5 5 5 35  2/19 2.5 At goal, no change  5 5 5 5 5 7.5 5 37.5  1/9 2.0 At goal, no change  5 5 5 5 5 7.5 5 37.5  10/9 2.7 Above goal, reduce x 1 5 5 5 5/7.5 5 5 5 37.5  7/31 2.2 At goal, no change  5 5 5 7.5 5 5 5 37.5  6/19 1.9 At goal, no change  5 5 5 7.5 5 5 5 37.5  5/8 2.0 At goal, no change  5 5 5 7.5 5 5 5 37.5  4/3 1.7 At goal, no change  5 5 5 7.5 5 5 5 37.5  2/27 2.4 At goal, no change  5 5 5 7.5 5 5 5 37.5   1/25 2.7 Above goal, continue  5 5 5 7.5 5 5 5 37.5   12/19 1.6 At goal (off warfarin)  5 5 5 7.5 5 5 5 37.5  11/14 2.4 At goal, no change  5 5 5 7.5 5 5 5 37.5  10/3 1.5 At goal, no change  5 5 5 7.5 5 5 5 37.5  9/5 2.4 At goal, no change  5 5 5 7.5 5 5 5 37.5  8/22 1.4 Below goal, increase  5 5 5 7.5 5 5 5 37.5  7/26 1.9 At goal, no change  5 5 5 5 5 5 5 35  7/2 1.3 Below goal, increase  5 5 5 5 5 5 5 35  6/20 1.2 Below goal, increase  2.5 5 2.5 5 2.5 5 5 27.5   6/7 1.2 Below goal, increase  2.5 5 2.5 2.5 5 2.5 2.5 22.5  6/2 --- Resume warfarin  2.5 2.5 2.5 2.5 2.5 2.5 2.5 17.5  Mar-May  Patient self-d/c'd warfarin  2/28 2.5 At goal, no change  5 7.5 5 5 5 5 5 37.5  1/17 2.1 At goal, no change  5 7.5 5 5 5 5 5 37.5  12/13 2.4 At goal, no change  5 7.5 5 5 5 5 5 37.5  11/15 2.2 At goal, no change  5 7.5 5 5 5 5 5 37.5  10/18 2.8 At goal, no change  5 7.5 5 5 5 5 5 37.5      Patient History:  Recent hospitalizations/HC visits 3/8/21: Sigmoidoscopy and anal fistulotomy d/t rectal bleeding  11/19 MOHS surgery for basal cell carcinoma of forehead    Recent medication changes None    Medications taken regularly that may interact with warfarin or alter INR ASA 81 mg, levothyroxine   Warfarin dose taken as prescribed -Held warfarin 1/4-1/10 for single tooth extraction, despite recommendation to only hold max 2-3 days for low bleeding risk procedure. -H/o noncompliance with warfarin dosing instructions  -Does not use pillbox   Signs/symptoms of bleeding H/o bleeding anal fistula. Warfarin held 2/2021-9/2021. Vitamin K intake -Normally has ~1-2 serving of green, leafy vegetables per week (usually broccoli once per week), has iceburg salad 3-4x per week  -12/1: no greens lately   -Drinks 1 glass of V8 juice occassionally   Recent vomiting/diarrhea/fever, changes in weight or activity level Patient states he is very tired all of the time and has to stop FDC between the parking lot and clinic office because he runs out of energy. This is an ongoing problem.    Tobacco or alcohol use -Patient reports quitting smoking 47 years ago  -Patient reports having 1-2 beers or shot of whiskey per day   Upcoming surgeries or procedures -Patient declined to consider Watchman procedure as of now  -Patient decided against DCCV due to need for weekly INR checks     Assessment/Plan:  Patient's INR was therapeutic today (1.9) based on INR goal of 1.8-2.2 per cardiology. Patient denies any missed or extra doses, change in diet, or signs and symptoms of bleeding. Since INR goal range is narrow, and patient refuses to come to clinic more than once monthly due to difficulty walking and cost burden, will consider INR of 1.5-2.5 acceptable. Patient is often noncompliant with clinic visits and warfarin dosing instructions. He self-doses warfarin and often holds it without being instructed to do so by a healthcare provider. He denies any recent medication or diet changes, or bleeding. Patient was instructed to continue warfarin 5 mg daily. Repeat INR in 8 weeks per patient request. Explained to patient that we recommend 4 week follow-up. He is agreeable to come earlier if there are changes to his medications, illness, bleeding, etc. Patient was instructed to maintain consistent vitamin K intake and call with any bleeding, medication changes, or fever/vomiting/diarrhea. Of note, patient has difficulty walking due to having polio as a child. It is becoming increasingly difficult for him to come to clinic for INR monitoring. Have discussed alternative options with patient on multiple occasions. He refuses switch to DOAC due to high cost.  Also suggested that patient look into home INR monitoring devices and provided information, but he has not done so. Also discussed possibility of home care visits and suggested that patient discuss with PCP, which he has not done. Patient understands dosing directions and information discussed. Dosing schedule and follow up appointment given to patient. Progress note routed to referring physician's office. Patient acknowledges working in consult agreement with pharmacist as referred by his/her physician. Next Clinic Appointment: 4/27    Please call [facility] at [phone] with any questions. Thanks!   Yajaira Zarate, PharmD, [facility]  [facility]  [place]: [phone]  [place]: [phone]  3/2/2022 9:18 AM    For Pharmacy Admin Tracking Only   Total # of Interventions Recommended: 0   Total # of Interventions Accepted: 0   Time Spent (min): 15

## 2022-03-03 LAB
ESTIMATED AVERAGE GLUCOSE: 125.5 MG/DL
HBA1C MFR BLD: 6 %

## 2022-03-11 ENCOUNTER — OFFICE VISIT (OUTPATIENT)
Dept: INTERNAL MEDICINE CLINIC | Age: 77
End: 2022-03-11
Payer: MEDICARE

## 2022-03-11 VITALS
SYSTOLIC BLOOD PRESSURE: 130 MMHG | HEIGHT: 71 IN | BODY MASS INDEX: 34.02 KG/M2 | WEIGHT: 243 LBS | TEMPERATURE: 99 F | DIASTOLIC BLOOD PRESSURE: 80 MMHG

## 2022-03-11 DIAGNOSIS — I10 PRIMARY HYPERTENSION: ICD-10-CM

## 2022-03-11 DIAGNOSIS — H57.9 EYE DISEASE: ICD-10-CM

## 2022-03-11 DIAGNOSIS — E05.00 GRAVES' EYE DISEASE: ICD-10-CM

## 2022-03-11 DIAGNOSIS — E07.9 THYROID DISEASE: ICD-10-CM

## 2022-03-11 DIAGNOSIS — Z00.00 MEDICARE ANNUAL WELLNESS VISIT, SUBSEQUENT: Primary | ICD-10-CM

## 2022-03-11 DIAGNOSIS — Z12.5 PROSTATE CANCER SCREENING: ICD-10-CM

## 2022-03-11 DIAGNOSIS — K04.7 TOOTH ABSCESS: ICD-10-CM

## 2022-03-11 DIAGNOSIS — Z00.00 WELL ADULT EXAM: ICD-10-CM

## 2022-03-11 DIAGNOSIS — I48.91 ATRIAL FIBRILLATION, UNSPECIFIED TYPE (HCC): ICD-10-CM

## 2022-03-11 DIAGNOSIS — B91 POST-POLIO MUSCLE WEAKNESS: ICD-10-CM

## 2022-03-11 DIAGNOSIS — C44.319 BASAL CELL CARCINOMA OF FOREHEAD: ICD-10-CM

## 2022-03-11 DIAGNOSIS — K60.3 FISTULA-IN-ANO: ICD-10-CM

## 2022-03-11 DIAGNOSIS — Z12.11 COLON CANCER SCREENING: ICD-10-CM

## 2022-03-11 DIAGNOSIS — E66.09 NON MORBID OBESITY DUE TO EXCESS CALORIES: ICD-10-CM

## 2022-03-11 DIAGNOSIS — M62.81 POST-POLIO MUSCLE WEAKNESS: ICD-10-CM

## 2022-03-11 DIAGNOSIS — K59.00 CONSTIPATION, UNSPECIFIED CONSTIPATION TYPE: ICD-10-CM

## 2022-03-11 DIAGNOSIS — E78.5 HYPERLIPIDEMIA, UNSPECIFIED HYPERLIPIDEMIA TYPE: ICD-10-CM

## 2022-03-11 DIAGNOSIS — N20.0 KIDNEY STONES: ICD-10-CM

## 2022-03-11 DIAGNOSIS — R73.9 HYPERGLYCEMIA: ICD-10-CM

## 2022-03-11 PROBLEM — M25.422 SWELLING OF JOINT, ELBOW, LEFT: Status: RESOLVED | Noted: 2017-05-25 | Resolved: 2022-03-11

## 2022-03-11 PROBLEM — L85.3 DRY SKIN: Status: RESOLVED | Noted: 2017-07-07 | Resolved: 2022-03-11

## 2022-03-11 PROCEDURE — 99213 OFFICE O/P EST LOW 20 MIN: CPT | Performed by: INTERNAL MEDICINE

## 2022-03-11 PROCEDURE — G0439 PPPS, SUBSEQ VISIT: HCPCS | Performed by: INTERNAL MEDICINE

## 2022-03-11 RX ORDER — WARFARIN SODIUM 5 MG/1
5 TABLET ORAL DAILY
Qty: 100 TABLET | Refills: 3 | Status: SHIPPED | OUTPATIENT
Start: 2022-03-11

## 2022-03-11 RX ORDER — ATENOLOL 50 MG/1
TABLET ORAL
Qty: 90 TABLET | Refills: 3 | Status: SHIPPED | OUTPATIENT
Start: 2022-03-11

## 2022-03-11 RX ORDER — LEVOTHYROXINE SODIUM 0.12 MG/1
TABLET ORAL
Qty: 90 TABLET | Refills: 2 | Status: SHIPPED | OUTPATIENT
Start: 2022-03-11

## 2022-03-11 SDOH — ECONOMIC STABILITY: FOOD INSECURITY: WITHIN THE PAST 12 MONTHS, YOU WORRIED THAT YOUR FOOD WOULD RUN OUT BEFORE YOU GOT MONEY TO BUY MORE.: NEVER TRUE

## 2022-03-11 SDOH — ECONOMIC STABILITY: FOOD INSECURITY: WITHIN THE PAST 12 MONTHS, THE FOOD YOU BOUGHT JUST DIDN'T LAST AND YOU DIDN'T HAVE MONEY TO GET MORE.: NEVER TRUE

## 2022-03-11 ASSESSMENT — ENCOUNTER SYMPTOMS
ABDOMINAL PAIN: 0
CHEST TIGHTNESS: 0
SHORTNESS OF BREATH: 0
RESPIRATORY NEGATIVE: 1

## 2022-03-11 ASSESSMENT — PATIENT HEALTH QUESTIONNAIRE - PHQ9
SUM OF ALL RESPONSES TO PHQ QUESTIONS 1-9: 0
SUM OF ALL RESPONSES TO PHQ QUESTIONS 1-9: 0
2. FEELING DOWN, DEPRESSED OR HOPELESS: 0
SUM OF ALL RESPONSES TO PHQ QUESTIONS 1-9: 0
SUM OF ALL RESPONSES TO PHQ QUESTIONS 1-9: 0
SUM OF ALL RESPONSES TO PHQ9 QUESTIONS 1 & 2: 0
1. LITTLE INTEREST OR PLEASURE IN DOING THINGS: 0

## 2022-03-11 ASSESSMENT — LIFESTYLE VARIABLES
HOW OFTEN DO YOU HAVE A DRINK CONTAINING ALCOHOL: 4 OR MORE TIMES A WEEK
HOW MANY STANDARD DRINKS CONTAINING ALCOHOL DO YOU HAVE ON A TYPICAL DAY: 1 OR 2

## 2022-03-11 ASSESSMENT — SOCIAL DETERMINANTS OF HEALTH (SDOH): HOW HARD IS IT FOR YOU TO PAY FOR THE VERY BASICS LIKE FOOD, HOUSING, MEDICAL CARE, AND HEATING?: NOT HARD AT ALL

## 2022-03-11 NOTE — PATIENT INSTRUCTIONS
After my care home consider seeing the other doctors in my practice  Dr. Karine Warner or Dr. Shola Morales (starts in August) OR  Dr. Jim Gudino or Dr. Brown Bergman   Eventually get your tetanus shot -a tdap    Get a skin check to be sure you have no new basal cells    Zuleika Rivera and partners [phone]    [facility]  [facility]  Jaxson Hogan  [phone]    Dr. Lenore Flowers and associates  [facility]  [phone]  Yesi Gonzalez. Dr. Trini Oakes. [phone]    Dr. Darcie Mazariegos and all partners [facility] or [facility] [phone]    Dr. Florentino Melara [phone] [place] Fees [phone]    Dr. Mika Huitron for Tamir Pipes - 3/11/2022  Medicare offers a range of preventive health benefits. Some of the tests and screenings are paid in full while other may be subject to a deductible, co-insurance, and/or copay. Some of these benefits include a comprehensive review of your medical history including lifestyle, illnesses that may run in your family, and various assessments and screenings as appropriate. After reviewing your medical record and screening and assessments performed today your provider may have ordered immunizations, labs, imaging, and/or referrals for you. A list of these orders (if applicable) as well as your Preventive Care list are included within your After Visit Summary for your review. Other Preventive Recommendations:    · A preventive eye exam performed by an eye specialist is recommended every 1-2 years to screen for glaucoma; cataracts, macular degeneration, and other eye disorders. · A preventive dental visit is recommended every 6 months. · Try to get at least 150 minutes of exercise per week or 10,000 steps per day on a pedometer .   · Order or download the FREE \"Exercise & Physical Activity: Your Everyday Guide\" from [employer]. Call 6-689.857.3529 or search [employer] online. · You need 5091-0548 mg of calcium and 3820-8515 IU of vitamin D per day. It is possible to meet your calcium requirement with diet alone, but a vitamin D supplement is usually necessary to meet this goal.  · When exposed to the sun, use a sunscreen that protects against both UVA and UVB radiation with an SPF of 30 or greater. Reapply every 2 to 3 hours or after sweating, drying off with a towel, or swimming. · Always wear a seat belt when traveling in a car. Always wear a helmet when riding a bicycle or motorcycle.

## 2022-03-11 NOTE — PROGRESS NOTES
Medicare Annual Wellness Visit    Olga Cabrera is here for Medicare AWV (yearly/review labs)    Assessment & Plan    Subjective:      Patient ID: Olga Cabrera is a 68 y.o. male. 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 Rectal fistula is resolved for now. Eye disease stable and conts to f/u w ophthalmology. Has not seen derm for a few years and needs checkup w h/o basal cell CA in multiple locations. -utd w Marlyn Jobs      Review of Systems   Constitutional: Positive for fatigue. Negative for appetite change. HENT: Negative. Eyes: Positive for visual disturbance. Respiratory: Negative. Negative for chest tightness and shortness of breath. Cardiovascular: Negative for chest pain and palpitations. Gastrointestinal: Negative for abdominal pain. Genitourinary: Negative. Musculoskeletal: Positive for gait problem. Skin: Negative. Neurological: Positive for weakness. Psychiatric/Behavioral: Negative for dysphoric mood and sleep disturbance. The patient is not nervous/anxious. History reviewed. No pertinent family history. Social History     Socioeconomic History    Marital status: Single     Spouse name: Not on file    Number of children: Not on file    Years of education: Not on file    Highest education level: Not on file   Occupational History    Not on file   Tobacco Use    Smoking status: Former Smoker     Packs/day: 0.00     Years: 10.00     Pack years: 0.00     Quit date: 1970     Years since quittin.5    Smokeless tobacco: Never Used    Tobacco comment: smoked 50 years ago   Vaping Use    Vaping Use: Never used   Substance and Sexual Activity    Alcohol use:  Yes     Alcohol/week: 1.0 standard drink     Types: 1 Cans of beer per week     Comment: beer or two every evening  Drug use: No    Sexual activity: Not on file   Other Topics Concern    Not on file   Social History Narrative    Not on file     Social Determinants of Health     Financial Resource Strain: Low Risk     Difficulty of Paying Living Expenses: Not hard at all   Food Insecurity: No Food Insecurity    Worried About Running Out of Food in the Last Year: Never true    [address] in the Last Year: Never true   Transportation Needs:     Lack of Transportation (Medical): Not on file    Lack of Transportation (Non-Medical): Not on file   Physical Activity: Insufficiently Active    Days of Exercise per Week: 3 days    Minutes of Exercise per Session: 30 min   Stress:     Feeling of Stress : Not on file   Social Connections:     Frequency of Communication with Friends and Family: Not on file    Frequency of Social Gatherings with Friends and Family: Not on file    Attends [employer] Services: Not on file    Active Member of Clubs or Organizations: Not on file    Attends Club or Organization Meetings: Not on file    Marital Status: Not on file   Intimate Partner Violence:     Fear of Current or Ex-Partner: Not on file    Emotionally Abused: Not on file    Physically Abused: Not on file    Sexually Abused: Not on file   Housing Stability:     Unable to Pay for Housing in the Last Year: Not on file    Number of [place] in the Last Year: Not on file    Unstable Housing in the Last Year: Not on file         Objective:   Physical Exam  Constitutional:       Appearance: He is well-developed. HENT:      Head: Atraumatic. Mouth/Throat:      Pharynx: No oropharyngeal exudate. Eyes:      Conjunctiva/sclera: Conjunctivae normal.      Pupils: Pupils are equal, round, and reactive to light. Neck:      Thyroid: No thyromegaly. Cardiovascular:      Rate and Rhythm: Tachycardia present. Rhythm irregular. Heart sounds: Normal heart sounds. No murmur heard.       Pulmonary:      [place]ort: Well adult exam     Colon cancer screening     Hyperlipidemia, unspecified hyperlipidemia type     Basal cell carcinoma of forehead     Graves' eye disease     Primary hypertension     Non morbid obesity due to excess calories     Hyperglycemia     Eye disease     Fistula-in-ano        Orders Placed This Encounter   Procedures   Nydia Morales MD, Dermatology, [facility]     Referral Priority:   Routine     Referral Type:   Eval and Treat     Referral Reason:   Specialty Services Required     Referred to Provider:   Livia Cook MD     Requested Specialty:   Dermatology     Number of Visits Requested:   1             Medicare annual wellness visit, subsequent  Thyroid disease  Assessment & Plan:   Controlled w current regimen- continue and monitor closely    Orders:  -     levothyroxine (EUTHYROX) 125 MCG tablet; TAKE 1 TABLET BY MOUTH ONCE DAILY, Disp-90 tablet, R-2Normal  Constipation, unspecified constipation type  -     levothyroxine (EUTHYROX) 125 MCG tablet; TAKE 1 TABLET BY MOUTH ONCE DAILY, Disp-90 tablet, R-2Normal  Post-polio muscle weakness  Assessment & Plan:   Cont to use cane and try to stay active   Orders:  -     levothyroxine (EUTHYROX) 125 MCG tablet; TAKE 1 TABLET BY MOUTH ONCE DAILY, Disp-90 tablet, R-2Normal  Tooth abscess  -     levothyroxine (EUTHYROX) 125 MCG tablet; TAKE 1 TABLET BY MOUTH ONCE DAILY, Disp-90 tablet, R-2Normal  Kidney stones  -     levothyroxine (EUTHYROX) 125 MCG tablet; TAKE 1 TABLET BY MOUTH ONCE DAILY, Disp-90 tablet, R-2Normal  Atrial fibrillation, unspecified type (HCC)  Assessment & Plan:   INR controlled   Orders:  -     levothyroxine (EUTHYROX) 125 MCG tablet; TAKE 1 TABLET BY MOUTH ONCE DAILY, Disp-90 tablet, R-2Normal  Prostate cancer screening  -     levothyroxine (EUTHYROX) 125 MCG tablet; TAKE 1 TABLET BY MOUTH ONCE DAILY, Disp-90 tablet, R-2Normal  Well adult exam  -     levothyroxine (EUTHYROX) 125 MCG tablet; TAKE 1 TABLET BY MOUTH ONCE DAILY, Disp-90 tablet, R-2Normal  Colon cancer screening  -     levothyroxine (EUTHYROX) 125 MCG tablet; TAKE 1 TABLET BY MOUTH ONCE DAILY, Disp-90 tablet, R-2Normal  Hyperlipidemia, unspecified hyperlipidemia type  -     levothyroxine (EUTHYROX) 125 MCG tablet; TAKE 1 TABLET BY MOUTH ONCE DAILY, Disp-90 tablet, R-2Normal  Basal cell carcinoma of forehead  Assessment & Plan:   Pt encouraged to see dermatology   Orders:  -     Jacky Devine [facility], [facility]  Assessment & Plan:   Cont f/u w endo  Primary hypertension  Assessment & Plan:   Controlled w current regimen- continue and monitor closely    Non morbid obesity due to excess calories  Assessment & Plan:   Discussed with patient at length health risks of obesity and need for diet and exercise    Hyperglycemia  Assessment & Plan:   Controlled w current regimen- continue and monitor closely  Slightly up today   Eye disease  Assessment & Plan:   utd w ophthalmology and fairly stable   Fistula-in-ano  Assessment & Plan:   Doing well now       Recommendations for Preventive Services Due: see orders and patient instructions/AVS.  Recommended screening schedule for the next 5-10 years is provided to the patient in written form: see Patient Instructions/AVS.     Return for Medicare Annual Wellness Visit in 1 year. Subjective   The following acute and/or chronic problems were also addressed today:  See note    Patient's complete Health Risk Assessment and screening values have been reviewed and are found in Flowsheets. The following problems were reviewed today and where indicated follow up appointments were made and/or referrals ordered. Positive Risk Factor Screenings with Interventions:        Alcohol Screening:       A score of 8 or more is associated with harmful or hazardous drinking. A score of 13 or more in women, and 15 or more in men, is likely to indicate alcohol dependence.     Substance Abuse - Alcohol Interventions:  Score under 8           General Health and ACP:  General  In general, how would you say your health is?: Good  In the past 7 days, have you experienced any of the following: New or Increased Pain, New or Increased Fatigue, Loneliness, Social Isolation, Stress or Anger?: (!) Yes  Select all that apply: (!) Stress  Do you get the social and emotional support that you need?: Yes  Do you have a Living Will?: (!) No    Advance Directives     Power of  Living Will ACP-Advance Directive ACP-Power of     Not on File Filed on 05/15/17 Filed Not on File      General Health Risk Interventions:  · No Living Will: Patient declines ACP discussion/assistance    Health Habits/Nutrition:     Physical Activity: Insufficiently Active    Days of Exercise per Week: 3 days    Minutes of Exercise per Session: 30 min     Have you lost any weight without trying in the past 3 months?: No  Body mass index: (!) 33.89  Have you seen the dentist within the past year?: (!) No    Health Habits/Nutrition Interventions:  · Has seen dentist regularly     Hearing/Vision:  Do you or your family notice any trouble with your hearing that hasn't been managed with hearing aids?: No  Do you have difficulty driving, watching TV, or doing any of your daily activities because of your eyesight?: No  Have you had an eye exam within the past year?: (!) No  No exam data present    Hearing/Vision Interventions:  · Vision concerns:  patient encouraged to make appointment with his/her eye specialist    Safety:  Do you have working smoke detectors?: (!) No  Do you have any tripping hazards - loose or unsecured carpets or rugs?: No  Do you have any tripping hazards - clutter in doorways, halls, or stairs?: No  Do you have either shower bars, grab bars, non-slip mats or non-slip surfaces in your shower or bathtub?: Yes  Do all of your stairways have a railing or banister?: Yes  Do you always fasten your seatbelt when you are in a car?: Yes    Safety Interventions:  · Home safety tips provided    ADLs:  In the past 7 days, did you need help from others to take care of any of the following: Laundry, housekeeping, banking/finances, shopping, telephone use, food preparation, transportation, or taking medications?: (!) Yes    ADL Interventions:  · Patient declines any further evaluation/treatment for this issue          Objective   Vitals:    03/11/22 1144   BP: 130/80   Temp: 99 °F (37.2 °C)   Weight: 243 lb (110.2 kg)   Height: 5' 11\" (1.803 m)      Body mass index is 33.89 kg/m². See noter fore PE       Allergies   Allergen Reactions    Naproxen Other (See Comments)     Welts and nerve pain     Prior to Visit Medications    Medication Sig Taking? Authorizing Provider   atenolol (TENORMIN) 50 MG tablet TAKE 1 TABLET BY MOUTH ONCE DAILY Yes Theodore Diggs MD   warfarin (COUMADIN) 5 MG tablet Take 1 tablet by mouth daily Or as directed by clinic. Yes Theodore Diggs MD   levothyroxine (EUTHYROX) 125 MCG tablet TAKE 1 TABLET BY MOUTH ONCE DAILY Yes Theodore Diggs MD   valsartan (DIOVAN) 80 MG tablet Take 1 tablet by mouth daily Take at noon daily.  Yes Janeen Hackett MD   Multiple Vitamins-Minerals (MULTIVITAMIN MEN 50+ PO) Take by mouth daily Yes Historical Provider, MD   vitamin D (CHOLECALCIFEROL) 1000 UNIT TABS tablet Take 2 tablets by mouth daily Yes Theodore Diggs MD       CareTeam (Including outside providers/suppliers regularly involved in providing care):   Patient Care Team:  Theodore Diggs MD as PCP - General (Internal Medicine)  Theodore Diggs MD as [facility]CP - [facility] Empaneled Provider  Keila Jessica MD as Surgeon (General Surgery)  Janeen Hackett MD as Consulting Physician (Cardiology)    Reviewed and updated this visit:  Tobacco  Allergies  Meds  Problems  Med Hx  Surg Hx  Soc Hx  Fam Hx

## 2022-04-01 ENCOUNTER — TELEPHONE (OUTPATIENT)
Dept: INTERNAL MEDICINE CLINIC | Age: 77
End: 2022-04-01

## 2022-04-01 NOTE — TELEPHONE ENCOUNTER
----- Message from Shalondaterrence Tavera sent at 3/31/2022 12:28 PM EDT -----  Subject: Message to Provider    QUESTIONS  Information for Provider? Pt called and would like a list on who the   provider would like to possibly refer him to since she will be retiring. If there is a list he would like to pick it up or have it texted to him.   ---------------------------------------------------------------------------  --------------  [address]  What is the best way for the office to contact you? OK to leave message on   voicemail  Preferred Call Back Phone Number? [phone]  ---------------------------------------------------------------------------  --------------  SCRIPT ANSWERS  Relationship to Patient?  Self

## 2022-04-18 ENCOUNTER — TELEPHONE (OUTPATIENT)
Dept: INTERNAL MEDICINE CLINIC | Age: 77
End: 2022-04-18

## 2022-04-18 NOTE — TELEPHONE ENCOUNTER
----- Message from Valeriy Plummer sent at 4/18/2022  9:38 AM EDT -----  Subject: Message to Provider    QUESTIONS  Information for Provider? patient of Dr. Moose Clifford is calling and   requesting if possible to get the list of Doctors that Dr. Moose Clifford   recommends to replace her for the patient's new pcp. please call and   advise.  ---------------------------------------------------------------------------  --------------  CALL BACK INFO  What is the best way for the office to contact you? OK to leave message on   voicemail  Preferred Call Back Phone Number? [phone]  ---------------------------------------------------------------------------  --------------  SCRIPT ANSWERS  Relationship to Patient?  Self

## 2022-04-27 ENCOUNTER — ANTI-COAG VISIT (OUTPATIENT)
Dept: PHARMACY | Age: 77
End: 2022-04-27
Payer: MEDICARE

## 2022-04-27 DIAGNOSIS — I48.91 ATRIAL FIBRILLATION, UNSPECIFIED TYPE (HCC): Primary | ICD-10-CM

## 2022-04-27 LAB — INTERNATIONAL NORMALIZATION RATIO, POC: 2.2

## 2022-04-27 PROCEDURE — 85610 PROTHROMBIN TIME: CPT | Performed by: PHARMACIST

## 2022-04-27 PROCEDURE — 99211 OFF/OP EST MAY X REQ PHY/QHP: CPT | Performed by: PHARMACIST

## 2022-04-27 NOTE — PROGRESS NOTES
ANTICOAGULATION SERVICE    Irma Perez is a 68 y.o. male with PMHx significant for A-fib, HTN, HLD who presents to drive thru clinic 3/37/7535 for anticoagulation monitoring and adjustment.     Anticoagulation Indication(s):  Afib    Referring Physician:  Dr. Naida Swan  Goal INR Range:  1.8-2.2, but 1.5-2.5 acceptable as patient refuses frequent INR monitoring  Duration of Anticoagulation Therapy:  Indefinite  Time of day dose taken:  PM  Product patient has at home:  warfarin 5 mg (peach)    [id number] Score for Atrial Fibrillation Stroke Risk   Risk   Factors  Component Value   C CHF No 0   H HTN Yes 1   A2 Age >= 76 Yes,  (77 y.o.) 2   D DM No 0   S2 Prior Stroke/TIA No 0   V Vascular Disease No 0   A Age 74-69 No,  (77 y.o.) 0   Sc Sex male 0    [id number]  Score  3   Score last updated 1/9/20 9:42 AM    INR Summary                            Warfarin regimen (mg)  Date INR   A/P    Sun Mon Tue Wed Thu Fri Sat Mg/wk  4/27 2.2 At goal, no change  5 5 5 5 5 5 5 35  3/2 1.9 At goal, no change  5 5 5 5 5 5 5 35  1/12 1.8 At goal, no change  5 5 5 5 5 5 5 35   12/1 1.5 Below goal, continue  5 5 5 5 5 5 5 35  10/18 1.5 Below goal, continue  5 5 5 5 5 5 5 35  2/2021-9/2021 OFF WARFARIN d/t rectal bleeding   1/19 1.8 At goal, no change  5 5 5 5 5 5 5 35  12/1 2.6 Above goal, continue  5 5 5 5 5 5 5 35  10/13 2.3 At goal, no change  5 5 5 5 5 5 5 35  9/1 2.5 Above goal, continue  5 5 5 5 5 5 5 35  7/21 2.2 At goal, no change  5 5 5 5 5 5 5 35  5/12 2.6 Above goal, decrease  5 5 5 5 5 5 5 35  2/19 2.5 At goal, no change  5 5 5 5 5 7.5 5 37.5  1/9 2.0 At goal, no change  5 5 5 5 5 7.5 5 37.5  10/9 2.7 Above goal, reduce x 1 5 5 5 5/7.5 5 5 5 37.5  7/31 2.2 At goal, no change  5 5 5 7.5 5 5 5 37.5  6/19 1.9 At goal, no change  5 5 5 7.5 5 5 5 37.5  5/8 2.0 At goal, no change  5 5 5 7.5 5 5 5 37.5  4/3 1.7 At goal, no change  5 5 5 7.5 5 5 5 37.5  2/27 2.4 At goal, no change  5 5 5 7.5 5 5 5 37.5   1/25 2.7 Above goal, continue  5 5 5 7.5 5 5 5 37.5   12/19 1.6 At goal (off warfarin)  5 5 5 7.5 5 5 5 37.5  11/14 2.4 At goal, no change  5 5 5 7.5 5 5 5 37.5  10/3 1.5 At goal, no change  5 5 5 7.5 5 5 5 37.5  9/5 2.4 At goal, no change  5 5 5 7.5 5 5 5 37.5  8/22 1.4 Below goal, increase  5 5 5 7.5 5 5 5 37.5  7/26 1.9 At goal, no change  5 5 5 5 5 5 5 35  7/2 1.3 Below goal, increase  5 5 5 5 5 5 5 35  6/20 1.2 Below goal, increase  2.5 5 2.5 5 2.5 5 5 27.5   6/7 1.2 Below goal, increase  2.5 5 2.5 2.5 5 2.5 2.5 22.5  6/2 --- Resume warfarin  2.5 2.5 2.5 2.5 2.5 2.5 2.5 17.5  Mar-May  Patient self-d/c'd warfarin  2/28 2.5 At goal, no change  5 7.5 5 5 5 5 5 37.5  1/17 2.1 At goal, no change  5 7.5 5 5 5 5 5 37.5  12/13 2.4 At goal, no change  5 7.5 5 5 5 5 5 37.5  11/15 2.2 At goal, no change  5 7.5 5 5 5 5 5 37.5  10/18 2.8 At goal, no change  5 7.5 5 5 5 5 5 37.5      Patient History:  Recent hospitalizations/HC visits 3/8/21: Sigmoidoscopy and anal fistulotomy d/t rectal bleeding  11/19 [facility] surgery for basal cell carcinoma of forehead    Recent medication changes None    Medications taken regularly that may interact with warfarin or alter INR ASA 81 mg, levothyroxine   Warfarin dose taken as prescribed -Held warfarin 1/4-1/10 for single tooth extraction, despite recommendation to only hold max 2-3 days for low bleeding risk procedure. -H/o noncompliance with warfarin dosing instructions  -Does not use pillbox   Signs/symptoms of bleeding H/o bleeding anal fistula. Warfarin held 2/2021-9/2021. Vitamin K intake -Normally has ~1-2 serving of green, leafy vegetables per week (usually broccoli once per week), has iceburg salad 3-4x per week  -12/1: no greens lately   -Drinks 1 glass of V8 juice occassionally   Recent vomiting/diarrhea/fever, changes in weight or activity level Patient states he is very tired all of the time and has to stop FPC between the parking lot and clinic office because he runs out of energy.  This is an ongoing problem. Tobacco or alcohol use -Patient reports quitting smoking 47 years ago  -Patient reports having 1-2 beers or shot of whiskey per day   Upcoming surgeries or procedures -Patient declined to consider Watchman procedure as of now  -Patient decided against DCCV due to need for weekly INR checks     Assessment/Plan:  Patient's INR was therapeutic today (12.2) based on INR goal of 1.8-2.2 per cardiology. Patient denies any missed or extra doses, change in diet, or signs and symptoms of bleeding. Since INR goal range is narrow, and patient refuses to come to clinic more than once monthly due to difficulty walking and cost burden, will consider INR of 1.5-2.5 acceptable. Patient is often noncompliant with clinic visits and warfarin dosing instructions. He self-doses warfarin and often holds it without being instructed to do so by a healthcare provider. He denies any recent medication or diet changes, or bleeding. Patient was instructed to continue warfarin 5 mg daily. Repeat INR in 8 weeks per patient request. Explained to patient that we recommend 4 week follow-up. He is agreeable to come earlier if there are changes to his medications, illness, bleeding, etc. Patient was instructed to maintain consistent vitamin K intake and call with any bleeding, medication changes, or fever/vomiting/diarrhea. Of note, patient has difficulty walking due to having polio as a child. It is becoming increasingly difficult for him to come to clinic for INR monitoring. Have discussed alternative options with patient on multiple occasions. He refuses switch to DOAC due to high cost.  Also suggested that patient look into home INR monitoring devices and provided information, but he has not done so. Also discussed possibility of home care visits and suggested that patient discuss with PCP, which he has not done. Patient understands dosing directions and information discussed.   Dosing schedule and follow up appointment given to patient. Progress note routed to referring physician's office. Patient acknowledges working in consult agreement with pharmacist as referred by his/her physician. Next Clinic Appointment: 6/22    Please call [facility] at [phone] with any questions. Thanks!   Jazz Green, PharmD, [facility]  [facility]  [place]: [phone]  [place]: [phone]  4/27/2022 9:13 AM    For Pharmacy Admin Tracking Only   Total # of Interventions Recommended: 0   Total # of Interventions Accepted: 0   Time Spent (min): 15

## 2022-06-13 ENCOUNTER — TELEPHONE (OUTPATIENT)
Dept: PHARMACY | Facility: CLINIC | Age: 77
End: 2022-06-13

## 2022-06-14 NOTE — TELEPHONE ENCOUNTER
Incoming call received from the patient - states that he is no longer taking valsartan d/t side effects and his PCP is aware. Removed from home med list.     [facility]. On hold > 5 minutes. Left message for pharmacy staff to cancel valsartan prescription and RTS. Will sign off.      Fernando ChavesD, Jai // Department, toll free [phone], option 1      For Pharmacy [address] in place:  No   Recommendation Provided To: Patient/Caregiver: 1 via Telephone and Pharmacy: 1   Intervention Detail: Adherence Monitorin   Gap Closed?: No    Intervention Accepted By: Patient/Caregiver: 0 and Pharmacy: 1   Time Spent (min): 20

## 2022-06-22 ENCOUNTER — ANTI-COAG VISIT (OUTPATIENT)
Dept: PHARMACY | Age: 77
End: 2022-06-22
Payer: MEDICARE

## 2022-06-22 DIAGNOSIS — I48.91 ATRIAL FIBRILLATION, UNSPECIFIED TYPE (HCC): Primary | ICD-10-CM

## 2022-06-22 LAB — INTERNATIONAL NORMALIZATION RATIO, POC: 2

## 2022-06-22 PROCEDURE — 85610 PROTHROMBIN TIME: CPT

## 2022-06-22 PROCEDURE — 99211 OFF/OP EST MAY X REQ PHY/QHP: CPT

## 2022-06-22 NOTE — PROGRESS NOTES
ANTICOAGULATION SERVICE    Viola Juarez is a 68 y.o. male with PMHx significant for A-fib, HTN, HLD who presents to drive thru clinic 4/67/0816 for anticoagulation monitoring and adjustment.     Anticoagulation Indication(s):  Afib    Referring Physician:  Dr. Albert Ospina  Goal INR Range:  1.8-2.2, but 1.5-2.5 acceptable as patient refuses frequent INR monitoring  Duration of Anticoagulation Therapy:  Indefinite  Time of day dose taken:  PM  Product patient has at home:  warfarin 5 mg (peach)    [id number] Score for Atrial Fibrillation Stroke Risk   Risk   Factors  Component Value   C CHF No 0   H HTN Yes 1   A2 Age >= 76 Yes,  (79 y.o.) 2   D DM No 0   S2 Prior Stroke/TIA No 0   V Vascular Disease No 0   A Age 74-69 No,  (79 y.o.) 0   Sc Sex male 0    [id number]  Score  3   Score last updated 1/9/20 9:42 AM    INR Summary                            Warfarin regimen (mg)  Date INR   A/P    Sun Mon Tue Wed Thu Fri Sat Mg/wk  6/22 2.0 At goal, no change  5 5 5 5 5 5 5 35  4/27 2.2 At goal, no change  5 5 5 5 5 5 5 35  3/2 1.9 At goal, no change  5 5 5 5 5 5 5 35  1/12 1.8 At goal, no change  5 5 5 5 5 5 5 35   12/1 1.5 Below goal, continue  5 5 5 5 5 5 5 35  10/18 1.5 Below goal, continue  5 5 5 5 5 5 5 35  2/2021-9/2021 OFF WARFARIN d/t rectal bleeding   1/19 1.8 At goal, no change  5 5 5 5 5 5 5 35  12/1 2.6 Above goal, continue  5 5 5 5 5 5 5 35  10/13 2.3 At goal, no change  5 5 5 5 5 5 5 35  9/1 2.5 Above goal, continue  5 5 5 5 5 5 5 35  7/21 2.2 At goal, no change  5 5 5 5 5 5 5 35  5/12 2.6 Above goal, decrease  5 5 5 5 5 5 5 35  2/19 2.5 At goal, no change  5 5 5 5 5 7.5 5 37.5  1/9 2.0 At goal, no change  5 5 5 5 5 7.5 5 37.5  10/9 2.7 Above goal, reduce x 1 5 5 5 5/7.5 5 5 5 37.5  7/31 2.2 At goal, no change  5 5 5 7.5 5 5 5 37.5  6/19 1.9 At goal, no change  5 5 5 7.5 5 5 5 37.5  5/8 2.0 At goal, no change  5 5 5 7.5 5 5 5 37.5  4/3 1.7 At goal, no change  5 5 5 7.5 5 5 5 37.5  2/27 2.4 At goal, no change  5 5 5 7.5 5 5 5 37.5   1/25 2.7 Above goal, continue  5 5 5 7.5 5 5 5 37.5   12/19 1.6 At goal (off warfarin)  5 5 5 7.5 5 5 5 37.5  11/14 2.4 At goal, no change  5 5 5 7.5 5 5 5 37.5  10/3 1.5 At goal, no change  5 5 5 7.5 5 5 5 37.5  9/5 2.4 At goal, no change  5 5 5 7.5 5 5 5 37.5  8/22 1.4 Below goal, increase  5 5 5 7.5 5 5 5 37.5  7/26 1.9 At goal, no change  5 5 5 5 5 5 5 35  7/2 1.3 Below goal, increase  5 5 5 5 5 5 5 35  6/20 1.2 Below goal, increase  2.5 5 2.5 5 2.5 5 5 27.5   6/7 1.2 Below goal, increase  2.5 5 2.5 2.5 5 2.5 2.5 22.5  6/2 --- Resume warfarin  2.5 2.5 2.5 2.5 2.5 2.5 2.5 17.5  Mar-May  Patient self-d/c'd warfarin  2/28 2.5 At goal, no change  5 7.5 5 5 5 5 5 37.5  1/17 2.1 At goal, no change  5 7.5 5 5 5 5 5 37.5  12/13 2.4 At goal, no change  5 7.5 5 5 5 5 5 37.5  11/15 2.2 At goal, no change  5 7.5 5 5 5 5 5 37.5  10/18 2.8 At goal, no change  5 7.5 5 5 5 5 5 37.5      Patient History:  Recent hospitalizations/HC visits 3/8/21: Sigmoidoscopy and anal fistulotomy d/t rectal bleeding  11/19 MOHS surgery for basal cell carcinoma of forehead    Recent medication changes 6/22/22: started taking \"veggie\" pills    Medications taken regularly that may interact with warfarin or alter INR ASA 81 mg, levothyroxine   Warfarin dose taken as prescribed -Held warfarin 1/4-1/10 for single tooth extraction, despite recommendation to only hold max 2-3 days for low bleeding risk procedure. -H/o noncompliance with warfarin dosing instructions  -Does not use pillbox   Signs/symptoms of bleeding H/o bleeding anal fistula. Warfarin held 2/2021-9/2021.     Vitamin K intake -Normally has ~1-2 serving of green, leafy vegetables per week (usually broccoli once per week), has iceburg salad 3-4x per week  -12/1: no greens lately   -Drinks 1 glass of V8 juice occassionally   Recent vomiting/diarrhea/fever, changes in weight or activity level Patient states he is very tired all of the time and has to stop [facility] between the parking lot and clinic office because he runs out of energy. This is an ongoing problem. Tobacco or alcohol use -Patient reports quitting smoking 47 years ago  -Patient reports having 1-2 beers or shot of whiskey per day   Upcoming surgeries or procedures -Patient declined to consider Watchman procedure as of now  -Patient decided against DCCV due to need for weekly INR checks     Assessment/Plan:  Patient's INR was therapeutic today (2.0) based on INR goal of 1.8-2.2 per cardiology. Patient denies any missed or extra doses, change in diet, or signs and symptoms of bleeding. Did start taking a \"veggie\" pill a few weeks ago, unsure what it contains. Asked patient to bring bottle to next appointment. Since INR goal range is narrow, and patient refuses to come to clinic more than once monthly due to difficulty walking and cost burden, will consider INR of 1.5-2.5 acceptable. Patient is often noncompliant with clinic visits and warfarin dosing instructions. He self-doses warfarin and often holds it without being instructed to do so by a healthcare provider. He denies any recent medication or diet changes, or bleeding. Patient was instructed to continue warfarin 5 mg daily. Repeat INR in 8 weeks per patient request. Explained to patient that we recommend 4 week follow-up. He is agreeable to come earlier if there are changes to his medications, illness, bleeding, etc. Patient was instructed to maintain consistent vitamin K intake and call with any bleeding, medication changes, or fever/vomiting/diarrhea. Of note, patient has difficulty walking due to having polio as a child. It is becoming increasingly difficult for him to come to clinic for INR monitoring. Have discussed alternative options with patient on multiple occasions.   He refuses switch to DOAC due to high cost.  Also suggested that patient look into home INR monitoring devices and provided information, but he has not done so. Also discussed possibility of home care visits and suggested that patient discuss with PCP, which he has not done. Patient understands dosing directions and information discussed. Dosing schedule and follow up appointment given to patient. Progress note routed to referring physician's office. Patient acknowledges working in consult agreement with pharmacist as referred by his/her physician. Next Clinic Appointment: 8/17    Please call [facility] at [phone] with any questions. Thanks!   Brittney Bradley, PharmD  PGY1 Pharmacy Resident  [facility]  6/22/2022 8:40 AM    For Pharmacy Admin Tracking Only   Total # of Interventions Recommended: 0   Total # of Interventions Accepted: 0   Time Spent (min): 15

## 2022-07-20 ENCOUNTER — OFFICE VISIT (OUTPATIENT)
Dept: INTERNAL MEDICINE CLINIC | Age: 77
End: 2022-07-20
Payer: MEDICARE

## 2022-07-20 VITALS
HEIGHT: 71 IN | BODY MASS INDEX: 34.3 KG/M2 | DIASTOLIC BLOOD PRESSURE: 80 MMHG | HEART RATE: 88 BPM | SYSTOLIC BLOOD PRESSURE: 124 MMHG | TEMPERATURE: 98.6 F | OXYGEN SATURATION: 98 % | WEIGHT: 245 LBS

## 2022-07-20 DIAGNOSIS — E07.9 THYROID DISEASE: ICD-10-CM

## 2022-07-20 DIAGNOSIS — E78.2 MIXED HYPERLIPIDEMIA: ICD-10-CM

## 2022-07-20 DIAGNOSIS — R60.0 LOCALIZED EDEMA: ICD-10-CM

## 2022-07-20 DIAGNOSIS — R73.9 HYPERGLYCEMIA: ICD-10-CM

## 2022-07-20 DIAGNOSIS — I10 PRIMARY HYPERTENSION: ICD-10-CM

## 2022-07-20 DIAGNOSIS — R60.9 PERIPHERAL EDEMA: ICD-10-CM

## 2022-07-20 DIAGNOSIS — R26.9 GAIT DISTURBANCE: ICD-10-CM

## 2022-07-20 DIAGNOSIS — I48.20 CHRONIC ATRIAL FIBRILLATION (HCC): ICD-10-CM

## 2022-07-20 DIAGNOSIS — R53.82 CHRONIC FATIGUE: Primary | ICD-10-CM

## 2022-07-20 PROBLEM — F32.A DEPRESSION: Status: RESOLVED | Noted: 2020-11-05 | Resolved: 2022-07-20

## 2022-07-20 PROBLEM — K62.5 RECTAL BLEEDING: Status: RESOLVED | Noted: 2021-02-11 | Resolved: 2022-07-20

## 2022-07-20 PROBLEM — E05.00 GRAVES' EYE DISEASE: Status: RESOLVED | Noted: 2020-11-05 | Resolved: 2022-07-20

## 2022-07-20 PROCEDURE — 1123F ACP DISCUSS/DSCN MKR DOCD: CPT | Performed by: INTERNAL MEDICINE

## 2022-07-20 PROCEDURE — 99214 OFFICE O/P EST MOD 30 MIN: CPT | Performed by: INTERNAL MEDICINE

## 2022-07-20 RX ORDER — FUROSEMIDE 20 MG/1
10 TABLET ORAL DAILY
Qty: 60 TABLET | Refills: 3 | Status: SHIPPED | OUTPATIENT
Start: 2022-07-20

## 2022-07-20 ASSESSMENT — PATIENT HEALTH QUESTIONNAIRE - PHQ9
8. MOVING OR SPEAKING SO SLOWLY THAT OTHER PEOPLE COULD HAVE NOTICED. OR THE OPPOSITE, BEING SO FIGETY OR RESTLESS THAT YOU HAVE BEEN MOVING AROUND A LOT MORE THAN USUAL: 0
SUM OF ALL RESPONSES TO PHQ QUESTIONS 1-9: 0
9. THOUGHTS THAT YOU WOULD BE BETTER OFF DEAD, OR OF HURTING YOURSELF: 0
5. POOR APPETITE OR OVEREATING: 0
SUM OF ALL RESPONSES TO PHQ9 QUESTIONS 1 & 2: 0
6. FEELING BAD ABOUT YOURSELF - OR THAT YOU ARE A FAILURE OR HAVE LET YOURSELF OR YOUR FAMILY DOWN: 0
SUM OF ALL RESPONSES TO PHQ9 QUESTIONS 1 & 2: 0
SUM OF ALL RESPONSES TO PHQ QUESTIONS 1-9: 0
2. FEELING DOWN, DEPRESSED OR HOPELESS: 0
SUM OF ALL RESPONSES TO PHQ QUESTIONS 1-9: 0
3. TROUBLE FALLING OR STAYING ASLEEP: 0
10. IF YOU CHECKED OFF ANY PROBLEMS, HOW DIFFICULT HAVE THESE PROBLEMS MADE IT FOR YOU TO DO YOUR WORK, TAKE CARE OF THINGS AT HOME, OR GET ALONG WITH OTHER PEOPLE: 0
4. FEELING TIRED OR HAVING LITTLE ENERGY: 0
1. LITTLE INTEREST OR PLEASURE IN DOING THINGS: 0
2. FEELING DOWN, DEPRESSED OR HOPELESS: 0
7. TROUBLE CONCENTRATING ON THINGS, SUCH AS READING THE NEWSPAPER OR WATCHING TELEVISION: 0
1. LITTLE INTEREST OR PLEASURE IN DOING THINGS: 0
SUM OF ALL RESPONSES TO PHQ QUESTIONS 1-9: 0

## 2022-07-20 NOTE — ASSESSMENT & PLAN NOTE
-stopped simvastatin due to myalgia (had it with lipitor and other statins as well), imporved since off it  -repeat lipids, ck

## 2022-07-20 NOTE — ASSESSMENT & PLAN NOTE
a1c in prediabetes range since at least 2018  -last a1c 6% 3/22  -try to work lifestyle modifications

## 2022-07-20 NOTE — PROGRESS NOTES
[facility]  Establish care visit   2022    Andriy Lama (:  1945) vahe 68 y.o. male, here to establish care. Chief Complaint   Patient presents with    Establish Care        Patient Active Problem List   Diagnosis    Thyroid disease    Kidney stones    Atrial fibrillation (HCC)    Hyperlipidemia    Testosterone deficiency    Fistula-in-ano    Hypertension    Non morbid obesity due to excess calories    Vitamin D deficiency    Hyperglycemia    Basal cell carcinoma of forehead    Basal cell carcinoma (BCC) of left medial cheek    Gait disturbance       ASSESSMENT/ PLAN:    Hypertension  Well controlled  -continue atenolol 50 mg daily  -bmp    Atrial fibrillation (HCC)  Well controlled  -continue atenolol   -on coumadin per coumadin clinic  -sees dr Kalpana Jimenez    Hyperlipidemia  -stopped simvastatin due to myalgia (had it with lipitor and other statins as well), imporved since off it  -repeat lipids, ck    Hyperglycemia  a1c in prediabetes range since at least   -last a1c 6% 3/22  -try to work lifestyle modifications     Thyroid disease  -hx of graves ~15 y ago, s/p thyroidectomy , s/p radioactive iodine   -hx of graves eye disease requiring surgical repair  -does not see endo any more  -continue levothyroxine 125mcg   -last TSH 3/22 wnl    Gait disturbance  -hx of polio ~age 7, s/p feet/ankle surgeries   -uses crutches in the last year, before used a cane for a year. Used to be able to walk independently.    -tried PT which made him feel weaker      Orders Placed This Encounter   Procedures    LIPID PANEL    CK    Comprehensive Metabolic Panel    ECHO 2D WO Color Doppler Complete     Orders Placed This Encounter   Medications    furosemide (LASIX) 20 MG tablet     Sig: Take 0.5 tablets by mouth in the morning. Dispense:  60 tablet     Refill:  3      There are no discontinued medications. No follow-ups on file.     HPI  68years old man with history of hypertension, hyperlipidemia, A. fib, prediabetes, thyroid disease, establishing care. Prior patient of Dr. Leandra Salgado who retired. Reports unsteady gait and leg weakness at baseline since polio infection in childhood requiring surgical intervention. Echo 8/2019  Left ventricular cavity size is normal. There is mild concentric left   ventricular hypertrophy. Left ventricular function is normal with ejection   fraction estimated at 55-60%. No regional wall motion abnormalities are   noted. Indeterminate diastolic function. Slight thickening of leaflets of   mitral valve. Mitral annular calcification is present. Mild mitral   regurgitation is present. Aortic valve appears thickened/calcified but opens   adequately. Mild aortic regurgitation is present. Mild to moderate tricuspid   regurgitation. Estimated pulmonary artery systolic pressure is at 40 mmHg   assuming a right atrial pressure of 3 mmHg    HISTORIES  Current Outpatient Medications on File Prior to Visit   Medication Sig Dispense Refill    atenolol (TENORMIN) 50 MG tablet TAKE 1 TABLET BY MOUTH ONCE DAILY 90 tablet 3    warfarin (COUMADIN) 5 MG tablet Take 1 tablet by mouth daily Or as directed by clinic. 100 tablet 3    levothyroxine (EUTHYROX) 125 MCG tablet TAKE 1 TABLET BY MOUTH ONCE DAILY 90 tablet 2    Multiple Vitamins-Minerals (MULTIVITAMIN MEN 50+ PO) Take by mouth daily      vitamin D (CHOLECALCIFEROL) 1000 UNIT TABS tablet Take 2 tablets by mouth daily 60 tablet      No current facility-administered medications on file prior to visit.         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 Considering reevaluation at [facility] or [facility]     Swelling of joint, elbow, left 2017    Due to heme arthrosis w poor INR control 2017     Patient Active Problem List   Diagnosis    Thyroid disease    Kidney stones    Atrial fibrillation (HCC)    Hyperlipidemia    Testosterone deficiency    Fistula-in-ano    Hypertension    Non morbid obesity due to excess calories    Vitamin D deficiency    Hyperglycemia    Basal cell carcinoma of forehead    Basal cell carcinoma (BCC) of left medial cheek    Gait disturbance     Past Surgical History:   Procedure Laterality Date    ABDOMEN SURGERY      ANUS SURGERY  14    EVALUATION UNDER ANESTHESIA, FISTULA PLUG    ANUS SURGERY N/A 3/8/2021    EXAM UNDER ANESTHESIA, FLEXIBLE SIGMOIDOSCOPY, FISTULOTOMY performed by Anthony Chao MD at [address] [facility]       COLONOSCOPY      repeat x10 yrs    EYE SURGERY      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    HERNIA REPAIR Right     LEG SURGERY      muscle transplant procedures for leg weakness    OTHER SURGICAL HISTORY  14    EVALUATION UNDER ANESTHESIA, RIGID SIGMOIDOSCOPY AND FISTULOTOMY    THYROID SURGERY      x2     Social History     Socioeconomic History    Marital status: Single     Spouse name: Not on file    Number of children: Not on file    Years of education: Not on file    Highest education level: Not on file   Occupational History    Not on file   Tobacco Use    Smoking status: Former     Packs/day: 0.00     Years: 10.00     Pack years: 0.00     Types: Cigarettes     Quit date: 1970     Years since quittin.9    Smokeless tobacco: Never    Tobacco comments:     smoked 50 years ago   Vaping Use    Vaping Use: Never used   Substance and Sexual Activity    Alcohol use:  Yes     Alcohol/week: 1.0 standard drink     Types: 1 Cans of beer per week     Comment: beer or two every evening    Drug use: No    Sexual activity: Not on file   Other Topics Concern    Not on file   Social History Narrative    Not on file     Social Determinants of Health     Financial Resource Strain: Low Risk     Difficulty of Paying Living Expenses: Not hard at all   Food Insecurity: No Food Insecurity    Worried About Running Out of Food in the Last Year: Never true    Ran Out of Food in the Last Year: Never true   Transportation Needs: Not on file   Physical Activity: Insufficiently Active    Days of Exercise per Week: 3 days    Minutes of Exercise per Session: 30 min   Stress: Not on file   Social Connections: Not on file   Intimate Partner Violence: Not on file   Housing Stability: Not on file      History reviewed. No pertinent family history. ROS  Review of Systems   Constitutional:  Positive for fatigue. Cardiovascular:  Positive for leg swelling. Negative for chest pain and palpitations. Musculoskeletal:  Positive for gait problem. PE  Vitals:    07/20/22 1003   BP: 124/80   Site: Right Upper Arm   Position: Sitting   Pulse: 88   Temp: 98.6 °F (37 °C)   SpO2: 98%   Weight: 245 lb (111.1 kg)   Height: 5' 11\" (1.803 m)     Estimated body mass index is 34.17 kg/m² as calculated from the following:    Height as of this encounter: 5' 11\" (1.803 m). Weight as of this encounter: 245 lb (111.1 kg). Physical Exam  Vitals reviewed. Constitutional:       General: He is not in acute distress. Appearance: Normal appearance. He is well-developed. He is not ill-appearing, toxic-appearing or diaphoretic. HENT:      Head: Normocephalic and atraumatic. Eyes:      General: No scleral icterus. Conjunctiva/sclera: Conjunctivae normal.      Pupils: Pupils are equal, round, and reactive to light. Cardiovascular:      Rate and Rhythm: Normal rate. Rhythm irregular. Heart sounds: Normal heart sounds. Pulmonary:      Effort: Pulmonary effort is normal. No respiratory distress. Breath sounds: Normal breath sounds. Musculoskeletal:      Cervical back: Normal range of motion and neck supple. Right lower leg: Edema (+3) present. Left lower leg: Edema (+3) present. Skin:     General: Skin is warm and dry. Coloration: Skin is not jaundiced. Neurological:      Mental Status: He is alert and oriented to person, place, and time. Sensory: No sensory deficit. Motor: Weakness (lt LE weakness) present. Gait: Gait abnormal.   Psychiatric:         Behavior: Behavior normal.       Immunization History   Administered Date(s) Administered    COVID-19, PFIZER PURPLE top, DILUTE for use, (age 15 y+), 30mcg/0.3mL 01/30/2021, 02/20/2021, 11/17/2021    Influenza Virus Vaccine 10/23/2013    Pneumococcal Conjugate 13-valent ([id number]) 07/05/2016    Pneumococcal Polysaccharide ([id number]) 10/24/2012       Health Maintenance   Topic Date Due    DTaP/Tdap/Td vaccine (1 - Tdap) Never done    COVID-19 Vaccine (4 - Booster for Pfizer series) 03/17/2022    Shingles vaccine (1 of 2) 03/11/2023 (Originally 6/7/1995)    Flu vaccine (1) 09/01/2022    Annual Wellness Visit (AWV)  03/12/2023    Depression Screen  07/20/2023    Pneumococcal 65+ years Vaccine  Completed    Hepatitis C screen  Completed    Hepatitis A vaccine  Aged Out    Hepatitis B vaccine  Aged Out    Hib vaccine  Aged Out    Meningococcal (ACWY) vaccine  Aged Out       PSH, PMH, SH and FH reviewed and noted. Recent and past labs, tests and consults also reviewed. Recent or new meds also reviewed. Albert Palomino MD    This dictation was generated by voice recognition computer software. Although all attempts are made to edit the dictation for accuracy, there may be errors in the transcription that are not intended.

## 2022-07-20 NOTE — ASSESSMENT & PLAN NOTE
-hx of polio ~age 7, s/p feet/ankle surgeries   -uses crutches in the last year, before used a cane for a year.   Used to be able to walk independently.    -tried PT which made him feel weaker

## 2022-07-20 NOTE — ASSESSMENT & PLAN NOTE
-hx of graves ~15 y ago, s/p thyroidectomy , s/p radioactive iodine   -hx of graves eye disease requiring surgical repair  -does not see endo any more  -continue levothyroxine 125mcg   -last TSH 3/22 wnl

## 2022-07-21 PROBLEM — R60.9 PERIPHERAL EDEMA: Status: ACTIVE | Noted: 2022-07-21

## 2022-07-21 PROBLEM — R60.0 PERIPHERAL EDEMA: Status: ACTIVE | Noted: 2022-07-21

## 2022-07-21 NOTE — ASSESSMENT & PLAN NOTE
Noted on exam  -start lasix 10 mg for swelling  -given also reports easy fatigability will order repeat echo, he would like to consult his cardiologist before getting it to limit cost which is reasonable  -CMP

## 2022-08-17 ENCOUNTER — ANTI-COAG VISIT (OUTPATIENT)
Dept: PHARMACY | Age: 77
End: 2022-08-17
Payer: MEDICARE

## 2022-08-17 DIAGNOSIS — I48.20 CHRONIC ATRIAL FIBRILLATION (HCC): Primary | ICD-10-CM

## 2022-08-17 LAB — INTERNATIONAL NORMALIZATION RATIO, POC: 1.7

## 2022-08-17 PROCEDURE — 99211 OFF/OP EST MAY X REQ PHY/QHP: CPT

## 2022-08-17 PROCEDURE — 85610 PROTHROMBIN TIME: CPT

## 2022-08-17 NOTE — PROGRESS NOTES
ANTICOAGULATION SERVICE    Kimo Blanco is a 68 y.o. male with PMHx significant for A-fib, HTN, HLD who presents to clinic 8/17/2022 for anticoagulation monitoring and adjustment.     Anticoagulation Indication(s):  Afib    Referring Physician:  Dr. Jerri Syed  Goal INR Range:  1.8-2.2, but 1.5-2.5 acceptable as patient refuses frequent INR monitoring  Duration of Anticoagulation Therapy:  Indefinite  Time of day dose taken:  PM  Product patient has at home:  warfarin 5 mg (peach)    [id number] Score for Atrial Fibrillation Stroke Risk   Risk   Factors  Component Value   C CHF No 0   H HTN Yes 1   A2 Age >= 76 Yes,  (79 y.o.) 2   D DM No 0   S2 Prior Stroke/TIA No 0   V Vascular Disease No 0   A Age 74-69 No,  (79 y.o.) 0   Sc Sex male 0    [id number]  Score  3   Score last updated 1/9/20 9:42 AM    INR Summary                            Warfarin regimen (mg)  Date INR   A/P    Sun Mon Tue Wed Thu Fri Sat Mg/wk  8/17 1.7 Below goal, continue  5 5 5 5 5 5 5 35  6/22 2.0 At goal, no change  5 5 5 5 5 5 5 35  4/27 2.2 At goal, no change  5 5 5 5 5 5 5 35  3/2 1.9 At goal, no change  5 5 5 5 5 5 5 35  1/12 1.8 At goal, no change  5 5 5 5 5 5 5 35   12/1 1.5 Below goal, continue  5 5 5 5 5 5 5 35  10/18 1.5 Below goal, continue  5 5 5 5 5 5 5 35  2/2021-9/2021 OFF WARFARIN d/t rectal bleeding   1/19 1.8 At goal, no change  5 5 5 5 5 5 5 35  12/1 2.6 Above goal, continue  5 5 5 5 5 5 5 35  10/13 2.3 At goal, no change  5 5 5 5 5 5 5 35  9/1 2.5 Above goal, continue  5 5 5 5 5 5 5 35  7/21 2.2 At goal, no change  5 5 5 5 5 5 5 35  5/12 2.6 Above goal, decrease  5 5 5 5 5 5 5 35  2/19 2.5 At goal, no change  5 5 5 5 5 7.5 5 37.5  1/9 2.0 At goal, no change  5 5 5 5 5 7.5 5 37.5  10/9 2.7 Above goal, reduce x 1 5 5 5 5/7.5 5 5 5 37.5  7/31 2.2 At goal, no change  5 5 5 7.5 5 5 5 37.5  6/19 1.9 At goal, no change  5 5 5 7.5 5 5 5 37.5  5/8 2.0 At goal, no change  5 5 5 7.5 5 5 5 37.5  4/3 1.7 At goal, no change  5 5 5 7.5 5 5 5 37.5  2/27 2.4 At goal, no change  5 5 5 7.5 5 5 5 37.5   1/25 2.7 Above goal, continue  5 5 5 7.5 5 5 5 37.5   12/19 1.6 At goal (off warfarin)  5 5 5 7.5 5 5 5 37.5  11/14 2.4 At goal, no change  5 5 5 7.5 5 5 5 37.5  10/3 1.5 At goal, no change  5 5 5 7.5 5 5 5 37.5  9/5 2.4 At goal, no change  5 5 5 7.5 5 5 5 37.5  8/22 1.4 Below goal, increase  5 5 5 7.5 5 5 5 37.5  7/26 1.9 At goal, no change  5 5 5 5 5 5 5 35  7/2 1.3 Below goal, increase  5 5 5 5 5 5 5 35  6/20 1.2 Below goal, increase  2.5 5 2.5 5 2.5 5 5 27.5   6/7 1.2 Below goal, increase  2.5 5 2.5 2.5 5 2.5 2.5 22.5  6/2 --- Resume warfarin  2.5 2.5 2.5 2.5 2.5 2.5 2.5 17.5  Mar-May  Patient self-d/c'd warfarin  2/28 2.5 At goal, no change  5 7.5 5 5 5 5 5 37.5  1/17 2.1 At goal, no change  5 7.5 5 5 5 5 5 37.5  12/13 2.4 At goal, no change  5 7.5 5 5 5 5 5 37.5  11/15 2.2 At goal, no change  5 7.5 5 5 5 5 5 37.5  10/18 2.8 At goal, no change  5 7.5 5 5 5 5 5 37.5      Patient History:  Recent hospitalizations/HC visits 3/8/21: Sigmoidoscopy and anal fistulotomy d/t rectal bleeding  11/19 MOHS surgery for basal cell carcinoma of forehead    Recent medication changes 6/22/22: started taking \"veggie\" pills    Medications taken regularly that may interact with warfarin or alter INR ASA 81 mg, levothyroxine   Warfarin dose taken as prescribed -Held warfarin 1/4-1/10 for single tooth extraction, despite recommendation to only hold max 2-3 days for low bleeding risk procedure. -H/o noncompliance with warfarin dosing instructions  -Does not use pillbox   Signs/symptoms of bleeding H/o bleeding anal fistula. Warfarin held 2/2021-9/2021.     Vitamin K intake -Normally has ~1-2 serving of green, leafy vegetables per week (usually broccoli once per week), has iceburg salad 3-4x per week  -12/1: no greens lately   -Drinks 1 glass of V8 juice occassionally   Recent vomiting/diarrhea/fever, changes in weight or activity level Patient states he is very tired all of the time and has to stop [facility] between the parking lot and clinic office because he runs out of energy. This is an ongoing problem. Tobacco or alcohol use -Patient reports quitting smoking 47 years ago  -Patient reports having 1-2 beers or shot of whiskey per day   Upcoming surgeries or procedures -Patient declined to consider Watchman procedure as of now  -Patient decided against DCCV due to need for weekly INR checks     Assessment/Plan:  Patient's INR was subtherapeutic today (1.7) based on INR goal of 1.8-2.2 per cardiology. Patient denies any missed or extra warfarin doses, change in diet, med changes, or bleeding since last visit. He started taking a \"veggie\" pill in June, but is unsure what it contains. He did not bring the medication to today's visit for review. He states he doesn't know if the veggie pill is helping anything, so he may stop taking it when his current supply runs out. Since INR goal range is narrow, and patient refuses to come to clinic more than once monthly due to difficulty walking and cost burden, will consider INR of 1.5-2.5 acceptable. Patient is often noncompliant with clinic visits and warfarin dosing instructions. He self-doses warfarin and often holds it without being instructed to do so by a healthcare provider. Patient was instructed to continue warfarin 5 mg daily. Repeat INR in 8 weeks per patient request. Explained to patient that we recommend 4 week follow-up. He is agreeable to come earlier if there are changes to his medications, illness, bleeding, etc. Patient was instructed to maintain consistent vitamin K intake and call with any bleeding, medication changes, or fever/vomiting/diarrhea. Of note, patient has difficulty walking due to having polio as a child. It is becoming increasingly difficult for him to come to clinic for INR monitoring. Have discussed alternative options with patient on multiple occasions.   He refuses switch to DOAC due to high cost.  Also suggested that patient look into home INR monitoring devices and provided information, but he has not done so. Also discussed possibility of home care visits and suggested that patient discuss with PCP, which he has not done. Patient understands dosing directions and information discussed. Dosing schedule and follow up appointment given to patient. Progress note routed to referring physician's office. Patient acknowledges working in consult agreement with pharmacist as referred by his/her physician. Next Clinic Appointment: 10/12    Please call [facility] at [phone] with any questions. Thanks! Marita Eddy.  Elzbieta Araya, PharmD, [facility]  [facility]  Ph: [phone]  2022 9:32 AM    For Pharmacy Admin Tracking Only    Intervention Detail: Adherence Monitorin  Total # of Interventions Recommended: 0  Total # of Interventions Accepted: 0  Time Spent (min): 15

## 2022-08-22 DIAGNOSIS — I10 PRIMARY HYPERTENSION: ICD-10-CM

## 2022-08-22 DIAGNOSIS — E78.2 MIXED HYPERLIPIDEMIA: ICD-10-CM

## 2022-08-22 DIAGNOSIS — R60.9 PERIPHERAL EDEMA: ICD-10-CM

## 2022-08-22 DIAGNOSIS — R26.9 GAIT DISTURBANCE: ICD-10-CM

## 2022-08-22 DIAGNOSIS — R53.82 CHRONIC FATIGUE: ICD-10-CM

## 2022-08-22 LAB
A/G RATIO: 1.4 (ref 1.1–2.2)
ALBUMIN SERPL-MCNC: 4.1 G/DL (ref 3.4–5)
ALP BLD-CCNC: 99 U/L (ref 40–129)
ALT SERPL-CCNC: 19 U/L (ref 10–40)
ANION GAP SERPL CALCULATED.3IONS-SCNC: 16 MMOL/L (ref 3–16)
AST SERPL-CCNC: 20 U/L (ref 15–37)
BILIRUB SERPL-MCNC: 0.6 MG/DL (ref 0–1)
BUN BLDV-MCNC: 10 MG/DL (ref 7–20)
CALCIUM SERPL-MCNC: 9.5 MG/DL (ref 8.3–10.6)
CHLORIDE BLD-SCNC: 102 MMOL/L (ref 99–110)
CHOLESTEROL, TOTAL: 277 MG/DL (ref 0–199)
CO2: 25 MMOL/L (ref 21–32)
CREAT SERPL-MCNC: 0.7 MG/DL (ref 0.8–1.3)
GFR AFRICAN AMERICAN: >60
GFR NON-AFRICAN AMERICAN: >60
GLUCOSE BLD-MCNC: 100 MG/DL (ref 70–99)
HDLC SERPL-MCNC: 46 MG/DL (ref 40–60)
LDL CHOLESTEROL CALCULATED: 203 MG/DL
POTASSIUM SERPL-SCNC: 4.9 MMOL/L (ref 3.5–5.1)
SODIUM BLD-SCNC: 143 MMOL/L (ref 136–145)
TOTAL CK: 48 U/L (ref 39–308)
TOTAL PROTEIN: 7 G/DL (ref 6.4–8.2)
TRIGL SERPL-MCNC: 139 MG/DL (ref 0–150)
VLDLC SERPL CALC-MCNC: 28 MG/DL

## 2022-08-29 DIAGNOSIS — E78.2 MIXED HYPERLIPIDEMIA: Primary | ICD-10-CM

## 2022-08-29 RX ORDER — PRAVASTATIN SODIUM 20 MG
20 TABLET ORAL DAILY
Qty: 90 TABLET | Refills: 1 | Status: SHIPPED | OUTPATIENT
Start: 2022-08-29

## 2022-09-02 ENCOUNTER — TELEPHONE (OUTPATIENT)
Dept: INTERNAL MEDICINE CLINIC | Age: 77
End: 2022-09-02

## 2022-09-02 NOTE — TELEPHONE ENCOUNTER
The patient returned the call to get his lab results. He was given his most recent results and doctor recommendations. He stated the he has already  started the new statin medication. He will follow up per the doctors instructions.

## 2022-09-07 PROBLEM — I35.1 MILD AORTIC REGURGITATION: Status: ACTIVE | Noted: 2022-09-07

## 2022-09-14 ENCOUNTER — TELEPHONE (OUTPATIENT)
Dept: INTERNAL MEDICINE CLINIC | Age: 77
End: 2022-09-14

## 2022-09-14 NOTE — TELEPHONE ENCOUNTER
Patient called, he had images done 09/01/22 (echo cardiogram) at [address] [place] [address] [phone]. He is calling [facility] to get the results.       Please call and advise

## 2022-10-12 ENCOUNTER — ANTI-COAG VISIT (OUTPATIENT)
Dept: PHARMACY | Age: 77
End: 2022-10-12
Payer: MEDICARE

## 2022-10-12 DIAGNOSIS — I48.20 CHRONIC ATRIAL FIBRILLATION (HCC): Primary | ICD-10-CM

## 2022-10-12 LAB — INTERNATIONAL NORMALIZATION RATIO, POC: 2.5

## 2022-10-12 PROCEDURE — 85610 PROTHROMBIN TIME: CPT | Performed by: PHARMACIST

## 2022-10-12 PROCEDURE — 99211 OFF/OP EST MAY X REQ PHY/QHP: CPT | Performed by: PHARMACIST

## 2022-10-12 NOTE — PROGRESS NOTES
ANTICOAGULATION SERVICE    Leeanne Faust is a 68 y.o. male with PMHx significant for A-fib, HTN, HLD who presents to clinic 10/12/2022 for anticoagulation monitoring and adjustment.     Anticoagulation Indication(s):  Afib    Referring Physician:  Dr. Domenico Dixon  Goal INR Range:  1.8-2.2, but 1.5-2.5 acceptable as patient refuses frequent INR monitoring  Duration of Anticoagulation Therapy:  Indefinite  Time of day dose taken:  PM  Product patient has at home:  warfarin 5 mg (peach)    [id number] Score for Atrial Fibrillation Stroke Risk   Risk   Factors  Component Value   C CHF No 0   H HTN Yes 1   A2 Age >= 76 Yes,  (79 y.o.) 2   D DM No 0   S2 Prior Stroke/TIA No 0   V Vascular Disease No 0   A Age 74-69 No,  (79 y.o.) 0   Sc Sex male 0    [id number]  Score  3   Score last updated 1/9/20 9:42 AM    INR Summary                            Warfarin regimen (mg)  Date INR   A/P    Sun Mon Tue Wed Thu Fri Sat Mg/wk  10/12 2.5 Above goal, continue   5 5 5 5 5 5 5 35  8/17 1.7 Below goal, continue  5 5 5 5 5 5 5 35  6/22 2.0 At goal, no change  5 5 5 5 5 5 5 35  4/27 2.2 At goal, no change  5 5 5 5 5 5 5 35  3/2 1.9 At goal, no change  5 5 5 5 5 5 5 35  1/12 1.8 At goal, no change  5 5 5 5 5 5 5 35   12/1 1.5 Below goal, continue  5 5 5 5 5 5 5 35  10/18 1.5 Below goal, continue  5 5 5 5 5 5 5 35  2/2021-9/2021 OFF WARFARIN d/t rectal bleeding   1/19 1.8 At goal, no change  5 5 5 5 5 5 5 35  12/1 2.6 Above goal, continue  5 5 5 5 5 5 5 35  10/13 2.3 At goal, no change  5 5 5 5 5 5 5 35  9/1 2.5 Above goal, continue  5 5 5 5 5 5 5 35  7/21 2.2 At goal, no change  5 5 5 5 5 5 5 35  5/12 2.6 Above goal, decrease  5 5 5 5 5 5 5 35  2/19 2.5 At goal, no change  5 5 5 5 5 7.5 5 37.5  1/9 2.0 At goal, no change  5 5 5 5 5 7.5 5 37.5  10/9 2.7 Above goal, reduce x 1 5 5 5 5/7.5 5 5 5 37.5  7/31 2.2 At goal, no change  5 5 5 7.5 5 5 5 37.5  6/19 1.9 At goal, no change  5 5 5 7.5 5 5 5 37.5  5/8 2.0 At goal, no change  5 5 5 7.5 5 5 5 37.5  4/3 1.7 At goal, no change  5 5 5 7.5 5 5 5 37.5  2/27 2.4 At goal, no change  5 5 5 7.5 5 5 5 37.5   1/25 2.7 Above goal, continue  5 5 5 7.5 5 5 5 37.5   12/19 1.6 At goal (off warfarin)  5 5 5 7.5 5 5 5 37.5  11/14 2.4 At goal, no change  5 5 5 7.5 5 5 5 37.5  10/3 1.5 At goal, no change  5 5 5 7.5 5 5 5 37.5  9/5 2.4 At goal, no change  5 5 5 7.5 5 5 5 37.5  8/22 1.4 Below goal, increase  5 5 5 7.5 5 5 5 37.5  7/26 1.9 At goal, no change  5 5 5 5 5 5 5 35  7/2 1.3 Below goal, increase  5 5 5 5 5 5 5 35  6/20 1.2 Below goal, increase  2.5 5 2.5 5 2.5 5 5 27.5   6/7 1.2 Below goal, increase  2.5 5 2.5 2.5 5 2.5 2.5 22.5  6/2 --- Resume warfarin  2.5 2.5 2.5 2.5 2.5 2.5 2.5 17.5  Mar-May  Patient self-d/c'd warfarin  2/28 2.5 At goal, no change  5 7.5 5 5 5 5 5 37.5  1/17 2.1 At goal, no change  5 7.5 5 5 5 5 5 37.5  12/13 2.4 At goal, no change  5 7.5 5 5 5 5 5 37.5  11/15 2.2 At goal, no change  5 7.5 5 5 5 5 5 37.5  10/18 2.8 At goal, no change  5 7.5 5 5 5 5 5 37.5      Patient History:  Recent hospitalizations/HC visits 3/8/21: Sigmoidoscopy and anal fistulotomy d/t rectal bleeding  11/19 MOHS surgery for basal cell carcinoma of forehead    Recent medication changes 9/2/22: started pravastatin   6/22/22: started taking \"veggie\" pills    Medications taken regularly that may interact with warfarin or alter INR ASA 81 mg, levothyroxine   Warfarin dose taken as prescribed -Held warfarin 1/4-1/10 for single tooth extraction, despite recommendation to only hold max 2-3 days for low bleeding risk procedure. -H/o noncompliance with warfarin dosing instructions  -Does not use pillbox   Signs/symptoms of bleeding H/o bleeding anal fistula. Warfarin held 2/2021-9/2021.     Vitamin K intake -Normally has ~1-2 serving of green, leafy vegetables per week (usually broccoli once per week), has iceburg salad 3-4x per week  -12/1: no greens lately   -Drinks 1 glass of V8 juice occassionally Recent vomiting/diarrhea/fever, changes in weight or activity level Patient states he is very tired all of the time and has to stop nursing home between the parking lot and clinic office because he runs out of energy. This is an ongoing problem. Tobacco or alcohol use -Patient reports quitting smoking 47 years ago  -Patient reports having 1-2 beers or shot of whiskey per day   Upcoming surgeries or procedures -Patient declined to consider Watchman procedure as of now  -Patient decided against DCCV due to need for weekly INR checks     Assessment/Plan:  Patient's INR was supratherapeutic today (2.5) based on INR goal of 1.8-2.2 per cardiology. Patient denies any missed or extra warfarin doses, change in diet, med changes, or bleeding since last visit. He was started on pravastatin, but would not expect this to affect his INR level. Since INR goal range is narrow, and patient refuses to come to clinic more than once monthly due to difficulty walking and cost burden, will consider INR of 1.5-2.5 acceptable. Patient is often noncompliant with clinic visits and warfarin dosing instructions. He self-doses warfarin and often holds it without being instructed to do so by a healthcare provider. Patient was instructed to continue warfarin 5 mg daily. Repeat INR in 8 weeks per patient request. Explained to patient that we recommend 4 week follow-up. He is agreeable to come earlier if there are changes to his medications, illness, bleeding, etc. Patient was instructed to maintain consistent vitamin K intake and call with any bleeding, medication changes, or fever/vomiting/diarrhea. Of note, patient has difficulty walking due to having polio as a child. It is becoming increasingly difficult for him to come to clinic for INR monitoring. Have discussed alternative options with patient on multiple occasions.   He refuses switch to DOAC due to high cost.  Also suggested that patient look into home INR monitoring devices and provided information, but he has not done so. Also discussed possibility of home care visits and suggested that patient discuss with PCP, which he has not done. Patient understands dosing directions and information discussed. Dosing schedule and follow up appointment given to patient. Progress note routed to referring physician's office. Patient acknowledges working in consult agreement with pharmacist as referred by his/her physician. Next Clinic Appointment: 12/7    Please call [facility] at [phone] with any questions. Thanks!   Ryann Martinez, PharmD, BCPS  [facility]  [place]: [phone]  [facility]: [phone]  10/12/2022 9:22 AM      For Pharmacy Admin Tracking Only  Total # of Interventions Recommended: 0  Total # of Interventions Accepted: 0  Time Spent (min): 15

## 2022-10-31 ENCOUNTER — OFFICE VISIT (OUTPATIENT)
Dept: CARDIOLOGY CLINIC | Age: 77
End: 2022-10-31
Payer: MEDICARE

## 2022-10-31 VITALS
WEIGHT: 243 LBS | HEART RATE: 87 BPM | DIASTOLIC BLOOD PRESSURE: 88 MMHG | BODY MASS INDEX: 33.89 KG/M2 | SYSTOLIC BLOOD PRESSURE: 138 MMHG

## 2022-10-31 DIAGNOSIS — I10 HTN (HYPERTENSION), BENIGN: ICD-10-CM

## 2022-10-31 DIAGNOSIS — I48.20 CHRONIC ATRIAL FIBRILLATION (HCC): Primary | ICD-10-CM

## 2022-10-31 DIAGNOSIS — E78.2 MIXED HYPERLIPIDEMIA: ICD-10-CM

## 2022-10-31 PROCEDURE — 93000 ELECTROCARDIOGRAM COMPLETE: CPT | Performed by: INTERNAL MEDICINE

## 2022-10-31 PROCEDURE — 3078F DIAST BP <80 MM HG: CPT | Performed by: INTERNAL MEDICINE

## 2022-10-31 PROCEDURE — 3074F SYST BP LT 130 MM HG: CPT | Performed by: INTERNAL MEDICINE

## 2022-10-31 PROCEDURE — 1123F ACP DISCUSS/DSCN MKR DOCD: CPT | Performed by: INTERNAL MEDICINE

## 2022-10-31 PROCEDURE — 99214 OFFICE O/P EST MOD 30 MIN: CPT | Performed by: INTERNAL MEDICINE

## 2022-10-31 NOTE — PROGRESS NOTES
Subjective:      Patient ID: Heidy Stratton is a 68 y.o. male. CC:  SOB at times. Chronic af.  HTN.  HLP. 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 Denies chest pain,  syncope, orthopnea, palpitations,   Had polio as a kid with lle weakness. He has no chest pain. He has anal fistula bleeding and stopped warfarin with considerable healing of the site. Needs another surgery. Allergies   Allergen Reactions    Naproxen Other (See Comments)     Welts and nerve pain        Social History     Socioeconomic History    Marital status: Single     Spouse name: Not on file    Number of children: Not on file    Years of education: Not on file    Highest education level: Not on file   Occupational History    Not on file   Tobacco Use    Smoking status: Former     Packs/day: 0.00     Years: 10.00     Pack years: 0.00     Types: Cigarettes     Quit date: 1970     Years since quittin.1    Smokeless tobacco: Never    Tobacco comments:     smoked 50 years ago   Vaping Use    Vaping Use: Never used   Substance and Sexual Activity    Alcohol use:  Yes     Alcohol/week: 1.0 standard drink     Types: 1 Cans of beer per week     Comment: beer or two every evening    Drug use: No    Sexual activity: Not on file   Other Topics Concern    Not on file   Social History Narrative    Not on file     Social Determinants of Health     Financial Resource Strain: Low Risk     Difficulty of Paying Living Expenses: Not hard at all   Food Insecurity: No Food Insecurity    Worried About Running Out of Food in the Last Year: Never true    Ran Out of Food in the Last Year: Never true   Transportation Needs: Not on file   Physical Activity: Insufficiently Active    Days of Exercise per Week: 3 days    Minutes of Exercise per Session: 30 min   Stress: Not on file   Social Connections: Not on file   Intimate Partner Violence: Not on file   Housing Stability: Not on file Patient has a family history is not on file. Patient  has a past medical history of Atrial fibrillation ([address] Cancer ([address] Epididymitis, Grave's disease, Graves disease, Graves' eye disease, Hyperlipidemia, Hypertension, Irregular heart rate, Polio, Post-polio muscle weakness, and Swelling of joint, elbow, left. Current Outpatient Medications   Medication Sig Dispense Refill    pravastatin (PRAVACHOL) 20 MG tablet Take 1 tablet by mouth daily 90 tablet 1    furosemide (LASIX) 20 MG tablet Take 0.5 tablets by mouth in the morning. 60 tablet 3    atenolol (TENORMIN) 50 MG tablet TAKE 1 TABLET BY MOUTH ONCE DAILY 90 tablet 3    warfarin (COUMADIN) 5 MG tablet Take 1 tablet by mouth daily Or as directed by clinic. 100 tablet 3    levothyroxine (EUTHYROX) 125 MCG tablet TAKE 1 TABLET BY MOUTH ONCE DAILY 90 tablet 2    Multiple Vitamins-Minerals (MULTIVITAMIN MEN 50+ PO) Take by mouth daily      vitamin D (CHOLECALCIFEROL) 1000 UNIT TABS tablet Take 2 tablets by mouth daily 60 tablet      No current facility-administered medications for this visit. Vitals  Weight: 243 lb (110.2 kg)  Blood Pressure: BP: (138)/(88)    Pulse: 87           Review of Systems   Constitutional: Negative. HENT: Negative. Eyes: Negative. Cardiovascular: Negative. Gastrointestinal: Negative. Endocrine: Negative. Genitourinary: Negative. Musculoskeletal: Negative. Skin: Negative. Allergic/Immunologic: Negative. Neurological: Negative. Hematological: Negative. Psychiatric/Behavioral: Negative. Objective:   Physical Exam   Constitutional: He is oriented to person, place, and time. He appears well-developed and well-nourished. HENT:   Head: Normocephalic and atraumatic. Eyes: EOM are normal. Pupils are equal, round, and reactive to light. Neck: Normal range of motion. Neck supple. Cardiovascular: Normal rate and regular rhythm. Exam reveals no friction rub.     No murmur heard.  Pulmonary/Chest: Effort normal and breath sounds normal.   Abdominal: Soft. Bowel sounds are normal.   Musculoskeletal: Normal range of motion. He exhibits no edema or deformity. Neurological: He is alert and oriented to person, place, and time. Skin: Skin is warm and dry. Psychiatric: He has a normal mood and affect. His behavior is normal. Thought content normal.       Assessment:      [id number] Score for Atrial Fibrillation Stroke Risk   Risk   Factors  Component Value   C CHF No 0   H HTN Yes 1   A2 Age >= 76 Yes,  (79 y.o.) 2   D DM No 0   S2 Prior Stroke/TIA No 0   V Vascular Disease No 0   A Age 74-69 No,  (79 y.o.) 0   Sc Sex male 0    [id number]  Score  3   Score last updated 23/7/49 96:20 AM    Click here for a link to the UpToDate guideline \"Atrial Fibrillation: Anticoagulation therapy to prevent embolization    Disclaimer: Risk Score calculation is dependent on accuracy of patient problem list and past encounter diagnosis. Diagnosis Orders   1. Chronic atrial fibrillation (HCC)  EKG 12 Lead      2. HTN (hypertension), benign        3. Mixed hyperlipidemia                Plan:      History of graves disease and had orbital decompression with resultant double vision. Had full polio. Has AF. ECG with late transition. off warfarin because of severe rectal bleeding. Off warfarin for 3 months. HTN:  .  Takes synthroid. SOB:  Seems controlled but will add diovan 80 mg daily. Hypercholesterolemia:    controllled with simva. Snores occasionally but doesn't exhibit all KATARINA. Needs another anal fistula surgery and is off warfarin. Doesn't want watchman. Goal INR can be 1.8 -2.2. STILL OFF WARFARIN FOR C AF.

## 2022-12-07 ENCOUNTER — ANTI-COAG VISIT (OUTPATIENT)
Dept: PHARMACY | Age: 77
End: 2022-12-07
Payer: MEDICARE

## 2022-12-07 DIAGNOSIS — I48.20 CHRONIC ATRIAL FIBRILLATION (HCC): Primary | ICD-10-CM

## 2022-12-07 LAB — INTERNATIONAL NORMALIZATION RATIO, POC: 1.7

## 2022-12-07 PROCEDURE — 85610 PROTHROMBIN TIME: CPT | Performed by: PHARMACIST

## 2022-12-07 PROCEDURE — 99211 OFF/OP EST MAY X REQ PHY/QHP: CPT | Performed by: PHARMACIST

## 2022-12-07 NOTE — PROGRESS NOTES
ANTICOAGULATION SERVICE    James Harrell is a 68 y.o. male with PMHx significant for A-fib, HTN, HLD who presents to clinic 12/7/2022 for anticoagulation monitoring and adjustment.     Anticoagulation Indication(s):  Afib    Referring Physician:  Dr. Ramírez Heart  Goal INR Range:  1.8-2.2, but 1.5-2.5 acceptable as patient refuses frequent INR monitoring  Duration of Anticoagulation Therapy:  Indefinite  Time of day dose taken:  PM  Product patient has at home:  warfarin 5 mg (peach)    [id number] Score for Atrial Fibrillation Stroke Risk   Risk   Factors  Component Value   C CHF No 0   H HTN Yes 1   A2 Age >= 76 Yes,  (79 y.o.) 2   D DM No 0   S2 Prior Stroke/TIA No 0   V Vascular Disease No 0   A Age 74-69 No,  (79 y.o.) 0   Sc Sex male 0    [id number]  Score  3   Score last updated 1/9/20 9:42 AM    INR Summary                            Warfarin regimen (mg)  Date INR   A/P    Sun Mon Tue Wed Thu Fri Sat Mg/wk  12/7 1.7 Below goal, continue   5 5 5 5 5 5 5 35  10/12 2.5 Above goal, continue   5 5 5 5 5 5 5 35  8/17 1.7 Below goal, continue  5 5 5 5 5 5 5 35  6/22 2.0 At goal, no change  5 5 5 5 5 5 5 35  4/27 2.2 At goal, no change  5 5 5 5 5 5 5 35  3/2 1.9 At goal, no change  5 5 5 5 5 5 5 35  1/12 1.8 At goal, no change  5 5 5 5 5 5 5 35   12/1 1.5 Below goal, continue  5 5 5 5 5 5 5 35  10/18 1.5 Below goal, continue  5 5 5 5 5 5 5 35  2/2021-9/2021 OFF WARFARIN d/t rectal bleeding   1/19 1.8 At goal, no change  5 5 5 5 5 5 5 35  12/1 2.6 Above goal, continue  5 5 5 5 5 5 5 35  10/13 2.3 At goal, no change  5 5 5 5 5 5 5 35  9/1 2.5 Above goal, continue  5 5 5 5 5 5 5 35  7/21 2.2 At goal, no change  5 5 5 5 5 5 5 35  5/12 2.6 Above goal, decrease  5 5 5 5 5 5 5 35  2/19 2.5 At goal, no change  5 5 5 5 5 7.5 5 37.5  1/9 2.0 At goal, no change  5 5 5 5 5 7.5 5 37.5  10/9 2.7 Above goal, reduce x 1 5 5 5 5/7.5 5 5 5 37.5  7/31 2.2 At goal, no change  5 5 5 7.5 5 5 5 37.5  6/19 1.9 At goal, no change  5 5 5 7.5 5 5 5 37.5  5/8 2.0 At goal, no change  5 5 5 7.5 5 5 5 37.5  4/3 1.7 At goal, no change  5 5 5 7.5 5 5 5 37.5  2/27 2.4 At goal, no change  5 5 5 7.5 5 5 5 37.5   1/25 2.7 Above goal, continue  5 5 5 7.5 5 5 5 37.5   12/19 1.6 At goal (off warfarin)  5 5 5 7.5 5 5 5 37.5  11/14 2.4 At goal, no change  5 5 5 7.5 5 5 5 37.5  10/3 1.5 At goal, no change  5 5 5 7.5 5 5 5 37.5  9/5 2.4 At goal, no change  5 5 5 7.5 5 5 5 37.5  8/22 1.4 Below goal, increase  5 5 5 7.5 5 5 5 37.5  7/26 1.9 At goal, no change  5 5 5 5 5 5 5 35  7/2 1.3 Below goal, increase  5 5 5 5 5 5 5 35  6/20 1.2 Below goal, increase  2.5 5 2.5 5 2.5 5 5 27.5   6/7 1.2 Below goal, increase  2.5 5 2.5 2.5 5 2.5 2.5 22.5  6/2 --- Resume warfarin  2.5 2.5 2.5 2.5 2.5 2.5 2.5 17.5  Mar-May  Patient self-d/c'd warfarin  2/28 2.5 At goal, no change  5 7.5 5 5 5 5 5 37.5  1/17 2.1 At goal, no change  5 7.5 5 5 5 5 5 37.5  12/13 2.4 At goal, no change  5 7.5 5 5 5 5 5 37.5  11/15 2.2 At goal, no change  5 7.5 5 5 5 5 5 37.5  10/18 2.8 At goal, no change  5 7.5 5 5 5 5 5 37.5      Patient History:  Recent hospitalizations/HC visits 3/8/21: Sigmoidoscopy and anal fistulotomy d/t rectal bleeding  11/19 MOHS surgery for basal cell carcinoma of forehead    Recent medication changes 9/2/22: started pravastatin   6/22/22: started taking \"veggie\" pills    Medications taken regularly that may interact with warfarin or alter INR ASA 81 mg, levothyroxine   Warfarin dose taken as prescribed -Held warfarin 1/4-1/10 for single tooth extraction, despite recommendation to only hold max 2-3 days for low bleeding risk procedure. -H/o noncompliance with warfarin dosing instructions  -Does not use pillbox   Signs/symptoms of bleeding H/o bleeding anal fistula. Warfarin held 2/2021-9/2021.     Vitamin K intake -Normally has ~1-2 serving of green, leafy vegetables per week (usually broccoli once per week), has iceburg salad 3-4x per week  -12/1: no greens lately -Drinks 1 glass of V8 juice occassionally   Recent vomiting/diarrhea/fever, changes in weight or activity level Patient states he is very tired all of the time and has to stop [facility] between the parking lot and clinic office because he runs out of energy. This is an ongoing problem. Tobacco or alcohol use -Patient reports quitting smoking 47 years ago  -Patient reports having 1-2 beers or shot of whiskey per day   Upcoming surgeries or procedures -Patient declined to consider Watchman procedure as of now  -Patient decided against DCCV due to need for weekly INR checks     Assessment/Plan:  Patient's INR was slightly subtherapeutic today (1.7) based on INR goal of 1.8-2.2 per cardiology. He does believe he missed his dose of warfarin last night, which may be why INR slightly low today. Patient denies any other missed or extra warfarin doses, change in diet, med changes, or bleeding since last visit. Since INR goal range is narrow, and patient refuses to come to clinic more than once monthly due to difficulty walking and cost burden, will consider INR of 1.5-2.5 acceptable. Patient is often noncompliant with clinic visits and warfarin dosing instructions. He self-doses warfarin and often holds it without being instructed to do so by a healthcare provider. Patient was instructed to continue warfarin 5 mg daily. Repeat INR in 8 weeks per patient request. Explained to patient that we recommend 4 week follow-up. He is agreeable to come earlier if there are changes to his medications, illness, bleeding, etc. Patient was instructed to maintain consistent vitamin K intake and call with any bleeding, medication changes, or fever/vomiting/diarrhea. Of note, patient has difficulty walking due to having polio as a child. It is becoming increasingly difficult for him to come to clinic for INR monitoring. Have discussed alternative options with patient on multiple occasions.   He refuses switch to DOAC due to high cost.  Also suggested that patient look into home INR monitoring devices and provided information, but he has not done so. Also discussed possibility of home care visits and suggested that patient discuss with PCP, which he has not done. Patient understands dosing directions and information discussed. Dosing schedule and follow up appointment given to patient. Progress note routed to referring physician's office. Patient acknowledges working in consult agreement with pharmacist as referred by his/her physician. Next Clinic Appointment: 2/1     Please call [facility] at [phone] with any questions. Thanks!   Davene Severin, PharmD, BCPS  [facility]  [place]: [phone]  [place]: [phone]  12/7/2022 9:10 AM      For Pharmacy Admin Tracking Only  Total # of Interventions Recommended: 0  Total # of Interventions Accepted: 0  Time Spent (min): 15

## 2022-12-19 DIAGNOSIS — N20.0 KIDNEY STONES: ICD-10-CM

## 2022-12-19 DIAGNOSIS — Z12.11 COLON CANCER SCREENING: ICD-10-CM

## 2022-12-19 DIAGNOSIS — E07.9 THYROID DISEASE: ICD-10-CM

## 2022-12-19 DIAGNOSIS — K59.00 CONSTIPATION, UNSPECIFIED CONSTIPATION TYPE: ICD-10-CM

## 2022-12-19 DIAGNOSIS — K04.7 TOOTH ABSCESS: ICD-10-CM

## 2022-12-19 DIAGNOSIS — M62.81 POST-POLIO MUSCLE WEAKNESS: ICD-10-CM

## 2022-12-19 DIAGNOSIS — E78.5 HYPERLIPIDEMIA, UNSPECIFIED HYPERLIPIDEMIA TYPE: ICD-10-CM

## 2022-12-19 DIAGNOSIS — I48.91 ATRIAL FIBRILLATION, UNSPECIFIED TYPE (HCC): ICD-10-CM

## 2022-12-19 DIAGNOSIS — Z12.5 PROSTATE CANCER SCREENING: ICD-10-CM

## 2022-12-19 DIAGNOSIS — Z00.00 WELL ADULT EXAM: ICD-10-CM

## 2022-12-19 DIAGNOSIS — B91 POST-POLIO MUSCLE WEAKNESS: ICD-10-CM

## 2022-12-19 RX ORDER — LEVOTHYROXINE SODIUM 0.12 MG/1
TABLET ORAL
Qty: 90 TABLET | Refills: 2 | Status: SHIPPED | OUTPATIENT
Start: 2022-12-19

## 2022-12-19 NOTE — TELEPHONE ENCOUNTER
Pt needs refill of      levothyroxine (EUTHYROX) 125 MCG tablet      [facility] [address], [address]

## 2022-12-20 DIAGNOSIS — Z00.00 WELL ADULT EXAM: ICD-10-CM

## 2022-12-20 DIAGNOSIS — I48.91 ATRIAL FIBRILLATION, UNSPECIFIED TYPE (HCC): ICD-10-CM

## 2022-12-20 DIAGNOSIS — E78.5 HYPERLIPIDEMIA, UNSPECIFIED HYPERLIPIDEMIA TYPE: ICD-10-CM

## 2022-12-20 DIAGNOSIS — Z12.5 PROSTATE CANCER SCREENING: ICD-10-CM

## 2022-12-20 DIAGNOSIS — K59.00 CONSTIPATION, UNSPECIFIED CONSTIPATION TYPE: ICD-10-CM

## 2022-12-20 DIAGNOSIS — M62.81 POST-POLIO MUSCLE WEAKNESS: ICD-10-CM

## 2022-12-20 DIAGNOSIS — E07.9 THYROID DISEASE: ICD-10-CM

## 2022-12-20 DIAGNOSIS — B91 POST-POLIO MUSCLE WEAKNESS: ICD-10-CM

## 2022-12-20 DIAGNOSIS — Z12.11 COLON CANCER SCREENING: ICD-10-CM

## 2022-12-20 DIAGNOSIS — N20.0 KIDNEY STONES: ICD-10-CM

## 2022-12-20 DIAGNOSIS — K04.7 TOOTH ABSCESS: ICD-10-CM

## 2022-12-20 RX ORDER — LEVOTHYROXINE SODIUM 0.12 MG/1
TABLET ORAL
Qty: 90 TABLET | Refills: 2 | OUTPATIENT
Start: 2022-12-20

## 2023-01-04 ENCOUNTER — TELEPHONE (OUTPATIENT)
Dept: INTERNAL MEDICINE CLINIC | Age: 78
End: 2023-01-04

## 2023-01-04 DIAGNOSIS — M79.672 CHRONIC PAIN OF BOTH FEET: Primary | ICD-10-CM

## 2023-01-04 DIAGNOSIS — G89.29 CHRONIC PAIN OF BOTH FEET: Primary | ICD-10-CM

## 2023-01-04 DIAGNOSIS — M79.671 CHRONIC PAIN OF BOTH FEET: Primary | ICD-10-CM

## 2023-01-04 NOTE — TELEPHONE ENCOUNTER
Disability parking letter printed/  Podiatrist referral placed  FOR DR Jevon Smith, [facility]  PARKING LETTER MAILED TO PATIENT  PT INFORMED  PATIENT WILL CALL TO MAKE APPT.  SOON

## 2023-01-04 NOTE — LETTER
[facility] [address]  [address] [address]  Phone: [phone]  Fax: [phone]    Radha Donohue MD         January 4, 2023     Patient: Andriy Lama   YOB: 1945   Date of Visit: 1/4/2023       To Whom It May Concern: It is my medical opinion that Yunior Watson requires a disability parking placard for the following reasons:  He cannot walk without assistance from another person or the use of an assistance device (cane, crutch, prosthetic device, wheelchair, etc.). Duration of need: 5 {days-years: If you have any questions or concerns, please don't hesitate to call.     Sincerely,        Radha Donohue MD

## 2023-01-04 NOTE — TELEPHONE ENCOUNTER
Patient called in requesting a letter for renewal of his handicap placard. Patient would also like a referral for podiatrist.       Rome Singh call and advise.

## 2023-02-01 ENCOUNTER — ANTI-COAG VISIT (OUTPATIENT)
Dept: PHARMACY | Age: 78
End: 2023-02-01
Payer: MEDICARE

## 2023-02-01 DIAGNOSIS — I48.20 CHRONIC ATRIAL FIBRILLATION (HCC): Primary | ICD-10-CM

## 2023-02-01 LAB — INTERNATIONAL NORMALIZATION RATIO, POC: 2.1

## 2023-02-01 PROCEDURE — 99211 OFF/OP EST MAY X REQ PHY/QHP: CPT | Performed by: PHARMACIST

## 2023-02-01 PROCEDURE — 85610 PROTHROMBIN TIME: CPT | Performed by: PHARMACIST

## 2023-02-01 NOTE — PROGRESS NOTES
ANTICOAGULATION SERVICE    Nery Puentes is a 68 y.o. male with PMHx significant for A-fib, HTN, HLD who presents to clinic 2/1/2023 for anticoagulation monitoring and adjustment.     Anticoagulation Indication(s):  Afib    Referring Physician:  Dr. Di Hunter  Goal INR Range:  1.8-2.2, but 1.5-2.5 acceptable as patient refuses frequent INR monitoring  Duration of Anticoagulation Therapy:  Indefinite  Time of day dose taken:  PM  Product patient has at home:  warfarin 5 mg (peach)    [id number] Score for Atrial Fibrillation Stroke Risk   Risk   Factors  Component Value   C CHF No 0   H HTN Yes 1   A2 Age >= 76 Yes,  (79 y.o.) 2   D DM No 0   S2 Prior Stroke/TIA No 0   V Vascular Disease No 0   A Age 74-69 No,  (79 y.o.) 0   Sc Sex male 0    [id number]  Score  3   Score last updated 1/9/20 9:42 AM    INR Summary                            Warfarin regimen (mg)  Date INR   A/P    Sun Mon Tue Wed Thu Fri Sat Mg/wk  2/1 2.1 At goal, continue   5 5 5 5 5 5 5 35  12/7 1.7 Below goal, continue   5 5 5 5 5 5 5 35  10/12 2.5 Above goal, continue   5 5 5 5 5 5 5 35  8/17 1.7 Below goal, continue  5 5 5 5 5 5 5 35  6/22 2.0 At goal, no change  5 5 5 5 5 5 5 35  4/27 2.2 At goal, no change  5 5 5 5 5 5 5 35  3/2 1.9 At goal, no change  5 5 5 5 5 5 5 35  1/12 1.8 At goal, no change  5 5 5 5 5 5 5 35   12/1 1.5 Below goal, continue  5 5 5 5 5 5 5 35  10/18 1.5 Below goal, continue  5 5 5 5 5 5 5 35  2/2021-9/2021 OFF WARFARIN d/t rectal bleeding   1/19 1.8 At goal, no change  5 5 5 5 5 5 5 35  12/1 2.6 Above goal, continue  5 5 5 5 5 5 5 35  10/13 2.3 At goal, no change  5 5 5 5 5 5 5 35  9/1 2.5 Above goal, continue  5 5 5 5 5 5 5 35  7/21 2.2 At goal, no change  5 5 5 5 5 5 5 35  5/12 2.6 Above goal, decrease  5 5 5 5 5 5 5 35  2/19 2.5 At goal, no change  5 5 5 5 5 7.5 5 37.5  1/9 2.0 At goal, no change  5 5 5 5 5 7.5 5 37.5  10/9 2.7 Above goal, reduce x 1 5 5 5 5/7.5 5 5 5 37.5  7/31 2.2 At goal, no change  5 5 5 7.5 5 5 5 37.5  6/19 1.9 At goal, no change  5 5 5 7.5 5 5 5 37.5  5/8 2.0 At goal, no change  5 5 5 7.5 5 5 5 37.5  4/3 1.7 At goal, no change  5 5 5 7.5 5 5 5 37.5  2/27 2.4 At goal, no change  5 5 5 7.5 5 5 5 37.5   1/25 2.7 Above goal, continue  5 5 5 7.5 5 5 5 37.5   12/19 1.6 At goal (off warfarin)  5 5 5 7.5 5 5 5 37.5  11/14 2.4 At goal, no change  5 5 5 7.5 5 5 5 37.5  10/3 1.5 At goal, no change  5 5 5 7.5 5 5 5 37.5  9/5 2.4 At goal, no change  5 5 5 7.5 5 5 5 37.5  8/22 1.4 Below goal, increase  5 5 5 7.5 5 5 5 37.5  7/26 1.9 At goal, no change  5 5 5 5 5 5 5 35  7/2 1.3 Below goal, increase  5 5 5 5 5 5 5 35  6/20 1.2 Below goal, increase  2.5 5 2.5 5 2.5 5 5 27.5   6/7 1.2 Below goal, increase  2.5 5 2.5 2.5 5 2.5 2.5 22.5  6/2 --- Resume warfarin  2.5 2.5 2.5 2.5 2.5 2.5 2.5 17.5  Mar-May  Patient self-d/c'd warfarin  2/28 2.5 At goal, no change  5 7.5 5 5 5 5 5 37.5  1/17 2.1 At goal, no change  5 7.5 5 5 5 5 5 37.5  12/13 2.4 At goal, no change  5 7.5 5 5 5 5 5 37.5  11/15 2.2 At goal, no change  5 7.5 5 5 5 5 5 37.5  10/18 2.8 At goal, no change  5 7.5 5 5 5 5 5 37.5      Patient History:  Recent hospitalizations/HC visits 3/8/21: Sigmoidoscopy and anal fistulotomy d/t rectal bleeding  11/19 MOHS surgery for basal cell carcinoma of forehead    Recent medication changes 9/2/22: started pravastatin   6/22/22: started taking \"veggie\" pills    Medications taken regularly that may interact with warfarin or alter INR ASA 81 mg, levothyroxine   Warfarin dose taken as prescribed -Held warfarin 1/4-1/10 for single tooth extraction, despite recommendation to only hold max 2-3 days for low bleeding risk procedure. -H/o noncompliance with warfarin dosing instructions  -Does not use pillbox   Signs/symptoms of bleeding H/o bleeding anal fistula. Warfarin held 2/2021-9/2021.     Vitamin K intake -Normally has ~1-2 serving of green, leafy vegetables per week (usually broccoli once per week), has perfecto salad 3-4x per week  -12/1: no greens lately   -Drinks 1 glass of V8 juice occassionally   Recent vomiting/diarrhea/fever, changes in weight or activity level Patient states he is very tired all of the time and has to stop shelter between the parking lot and clinic office because he runs out of energy. This is an ongoing problem. Tobacco or alcohol use -Patient reports quitting smoking 47 years ago  -Patient reports having 1-2 beers or shot of whiskey per day   Upcoming surgeries or procedures -Patient declined to consider Watchman procedure as of now  -Patient decided against DCCV due to need for weekly INR checks     Assessment/Plan:  Patient's INR was therapeutic today (2.1) based on INR goal of 1.8-2.2 per cardiology. Patient denies any other missed or extra warfarin doses, change in diet, med changes, or bleeding since last visit. Since INR goal range is narrow, and patient refuses to come to clinic more than once monthly due to difficulty walking and cost burden, will consider INR of 1.5-2.5 acceptable. Patient is often noncompliant with clinic visits and warfarin dosing instructions. He self-doses warfarin and often holds it without being instructed to do so by a healthcare provider. Patient was instructed to continue warfarin 5 mg daily. Repeat INR in 8 weeks per patient request. Explained to patient that we recommend 4 week follow-up. He is agreeable to come earlier if there are changes to his medications, illness, bleeding, etc. Patient was instructed to maintain consistent vitamin K intake and call with any bleeding, medication changes, or fever/vomiting/diarrhea. Of note, patient has difficulty walking due to having polio as a child. It is becoming increasingly difficult for him to come to clinic for INR monitoring. Have discussed alternative options with patient on multiple occasions.   He refuses switch to DOAC due to high cost.  Also suggested that patient look into home INR monitoring devices and provided information, but he has not done so. Also discussed possibility of home care visits and suggested that patient discuss with PCP, which he has not done. Patient understands dosing directions and information discussed. Dosing schedule and follow up appointment given to patient. Progress note routed to referring physician's office. Patient acknowledges working in consult agreement with pharmacist as referred by his/her physician. Next Clinic Appointment: 3/29     Please call [facility] at [phone] with any questions. Thanks!   Early Huber Stockton, BCPS  [facility] [address] [facility]: [phone]  [facility]: [phone]  2/1/2023 9:14 AM    For Pharmacy Admin Tracking Only  Time Spent (min): 15

## 2023-02-25 DIAGNOSIS — E78.2 MIXED HYPERLIPIDEMIA: ICD-10-CM

## 2023-02-27 RX ORDER — PRAVASTATIN SODIUM 20 MG
TABLET ORAL
Qty: 90 TABLET | Refills: 1 | Status: SHIPPED | OUTPATIENT
Start: 2023-02-27

## 2023-03-29 ENCOUNTER — ANTI-COAG VISIT (OUTPATIENT)
Dept: PHARMACY | Age: 78
End: 2023-03-29
Payer: MEDICARE

## 2023-03-29 DIAGNOSIS — I48.20 CHRONIC ATRIAL FIBRILLATION (HCC): Primary | ICD-10-CM

## 2023-03-29 LAB — INTERNATIONAL NORMALIZATION RATIO, POC: 2.5

## 2023-03-29 PROCEDURE — 85610 PROTHROMBIN TIME: CPT | Performed by: PHARMACIST

## 2023-03-29 PROCEDURE — 99211 OFF/OP EST MAY X REQ PHY/QHP: CPT | Performed by: PHARMACIST

## 2023-03-29 NOTE — PROGRESS NOTES
ANTICOAGULATION SERVICE    Krishna Guo is a 68 y.o. male with PMHx significant for A-fib, HTN, HLD who presents to clinic 3/29/2023 for anticoagulation monitoring and adjustment.     Anticoagulation Indication(s):  Afib    Referring Physician:  Dr. Promise Patterson  Goal INR Range:  1.8-2.2, but 1.5-2.5 acceptable as patient refuses frequent INR monitoring  Duration of Anticoagulation Therapy:  Indefinite  Time of day dose taken:  PM  Product patient has at home:  warfarin 5 mg (peach)    [id number] Score for Atrial Fibrillation Stroke Risk   Risk   Factors  Component Value   C CHF No 0   H HTN Yes 1   A2 Age >= 76 Yes,  (79 y.o.) 2   D DM No 0   S2 Prior Stroke/TIA No 0   V Vascular Disease No 0   A Age 74-69 No,  (79 y.o.) 0   Sc Sex male 0    [id number]  Score  3   Score last updated 1/9/20 9:42 AM    INR Summary                            Warfarin regimen (mg)  Date INR   A/P    Sun Mon Tue Wed Thu Fri Sat Mg/wk  3/29 2.5 At goal, continue   5 5 5 5 5 5 5 35  2/1 2.1 At goal, continue   5 5 5 5 5 5 5 35  12/7 1.7 Below goal, continue   5 5 5 5 5 5 5 35  10/12 2.5 Above goal, continue   5 5 5 5 5 5 5 35  8/17 1.7 Below goal, continue  5 5 5 5 5 5 5 35  6/22 2.0 At goal, no change  5 5 5 5 5 5 5 35  4/27 2.2 At goal, no change  5 5 5 5 5 5 5 35  3/2 1.9 At goal, no change  5 5 5 5 5 5 5 35  1/12 1.8 At goal, no change  5 5 5 5 5 5 5 35   12/1 1.5 Below goal, continue  5 5 5 5 5 5 5 35  10/18 1.5 Below goal, continue  5 5 5 5 5 5 5 35  2/2021-9/2021 OFF WARFARIN d/t rectal bleeding   1/19 1.8 At goal, no change  5 5 5 5 5 5 5 35  12/1 2.6 Above goal, continue  5 5 5 5 5 5 5 35  10/13 2.3 At goal, no change  5 5 5 5 5 5 5 35  9/1 2.5 Above goal, continue  5 5 5 5 5 5 5 35  7/21 2.2 At goal, no change  5 5 5 5 5 5 5 35  5/12 2.6 Above goal, decrease  5 5 5 5 5 5 5 35  2/19 2.5 At goal, no change  5 5 5 5 5 7.5 5 37.5  1/9 2.0 At goal, no change  5 5 5 5 5 7.5 5 37.5  10/9 2.7 Above goal, reduce x

## 2023-04-27 ENCOUNTER — OFFICE VISIT (OUTPATIENT)
Dept: CARDIOLOGY CLINIC | Age: 78
End: 2023-04-27
Payer: MEDICARE

## 2023-04-27 VITALS
SYSTOLIC BLOOD PRESSURE: 130 MMHG | HEART RATE: 74 BPM | DIASTOLIC BLOOD PRESSURE: 60 MMHG | BODY MASS INDEX: 35.15 KG/M2 | WEIGHT: 252 LBS

## 2023-04-27 DIAGNOSIS — I10 PRIMARY HYPERTENSION: ICD-10-CM

## 2023-04-27 DIAGNOSIS — D68.69 SECONDARY HYPERCOAGULABLE STATE (HCC): ICD-10-CM

## 2023-04-27 DIAGNOSIS — I48.20 CHRONIC A-FIB (HCC): ICD-10-CM

## 2023-04-27 DIAGNOSIS — E78.2 MIXED HYPERLIPIDEMIA: ICD-10-CM

## 2023-04-27 DIAGNOSIS — I35.1 MILD AORTIC REGURGITATION: Primary | ICD-10-CM

## 2023-04-27 PROCEDURE — 99214 OFFICE O/P EST MOD 30 MIN: CPT | Performed by: INTERNAL MEDICINE

## 2023-04-27 PROCEDURE — 1123F ACP DISCUSS/DSCN MKR DOCD: CPT | Performed by: INTERNAL MEDICINE

## 2023-04-27 PROCEDURE — 3078F DIAST BP <80 MM HG: CPT | Performed by: INTERNAL MEDICINE

## 2023-04-27 PROCEDURE — 3075F SYST BP GE 130 - 139MM HG: CPT | Performed by: INTERNAL MEDICINE

## 2023-04-27 NOTE — PROGRESS NOTES
behavior is normal. Thought content normal.       Assessment:      [id number] Score for Atrial Fibrillation Stroke Risk   Risk   Factors  Component Value   C CHF No 0   H HTN Yes 1   A2 Age >= 76 Yes,  (79 y.o.) 2   D DM No 0   S2 Prior Stroke/TIA No 0   V Vascular Disease No 0   A Age 74-69 No,  (79 y.o.) 0   Sc Sex male 0    [id number]  Score  3   Score last updated 58/5/12 49:24 AM    Click here for a link to the UpToDate guideline \"Atrial Fibrillation: Anticoagulation therapy to prevent embolization    Disclaimer: Risk Score calculation is dependent on accuracy of patient problem list and past encounter diagnosis. Diagnosis Orders   1. Mild aortic regurgitation  Comprehensive Metabolic Panel    CBC with Auto Differential    LIPID PANEL      2. Primary hypertension  Comprehensive Metabolic Panel    CBC with Auto Differential    LIPID PANEL      3. Mixed hyperlipidemia  Comprehensive Metabolic Panel    CBC with Auto Differential    LIPID PANEL      4. Chronic a-fib (HCC)  Comprehensive Metabolic Panel    CBC with Auto Differential    LIPID PANEL      5. 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 HTN:  controlled with atenolol. Takes synthroid. SOB:  Seems controlled but will add diovan 80 mg daily. Hypercholesterolemia:    controllled with simva. Snores occasionally but doesn't exhibit all KATARINA.  CAF;  continue warfarin. Check echo. .    Doesn't want watchman. Goal INR can be 1.8 -2.2.  taking WARFARIN FOR C AF.

## 2023-05-02 ENCOUNTER — TELEPHONE (OUTPATIENT)
Dept: INTERNAL MEDICINE CLINIC | Age: 78
End: 2023-05-02

## 2023-05-02 DIAGNOSIS — E78.2 MIXED HYPERLIPIDEMIA: Primary | ICD-10-CM

## 2023-05-02 DIAGNOSIS — E07.9 THYROID DISEASE: ICD-10-CM

## 2023-05-02 DIAGNOSIS — R73.9 HYPERGLYCEMIA: ICD-10-CM

## 2023-05-02 DIAGNOSIS — R73.03 PRE-DIABETES: ICD-10-CM

## 2023-05-09 RX ORDER — WARFARIN SODIUM 5 MG/1
TABLET ORAL
Qty: 100 TABLET | Refills: 3 | Status: SHIPPED | OUTPATIENT
Start: 2023-05-09

## 2023-05-09 NOTE — TELEPHONE ENCOUNTER
I Medication Refills:      warfarin (COUMADIN) 5 MG tablet [[id number]]     Order Details  Dose: 5 mg Route: Oral Frequency: DAILY   Dispense Quantity: 100 tablet Refills: 3          Sig: Take 1 tablet by mouth daily Or as directed by clinic.            Pharmacy    [facility] [address], [address] Diana Miller [phone]   [address], [address] [facility] [address]   Phone:  [phone]  Fax:  [phone]       Last Office Visit: 04/27/23    Next Office Visit: 07/28/23    Last Refill: 03/11/22    Last Labs: 03/29/23

## 2023-05-15 DIAGNOSIS — R73.03 PRE-DIABETES: ICD-10-CM

## 2023-05-15 DIAGNOSIS — R73.9 HYPERGLYCEMIA: ICD-10-CM

## 2023-05-15 DIAGNOSIS — E07.9 THYROID DISEASE: ICD-10-CM

## 2023-05-15 DIAGNOSIS — E78.2 MIXED HYPERLIPIDEMIA: ICD-10-CM

## 2023-05-15 LAB
ALBUMIN SERPL-MCNC: 4.1 G/DL (ref 3.4–5)
ALBUMIN/GLOB SERPL: 1.5 {RATIO} (ref 1.1–2.2)
ALP SERPL-CCNC: 84 U/L (ref 40–129)
ALT SERPL-CCNC: 16 U/L (ref 10–40)
ANION GAP SERPL CALCULATED.3IONS-SCNC: 7 MMOL/L (ref 3–16)
AST SERPL-CCNC: 21 U/L (ref 15–37)
BASOPHILS # BLD: 0.1 K/UL (ref 0–0.2)
BASOPHILS NFR BLD: 1.5 %
BILIRUB SERPL-MCNC: 0.5 MG/DL (ref 0–1)
BUN SERPL-MCNC: 13 MG/DL (ref 7–20)
CALCIUM SERPL-MCNC: 9.3 MG/DL (ref 8.3–10.6)
CHLORIDE SERPL-SCNC: 103 MMOL/L (ref 99–110)
CHOLEST SERPL-MCNC: 187 MG/DL (ref 0–199)
CO2 SERPL-SCNC: 29 MMOL/L (ref 21–32)
CREAT SERPL-MCNC: 0.6 MG/DL (ref 0.8–1.3)
DEPRECATED RDW RBC AUTO: 13.8 % (ref 12.4–15.4)
EOSINOPHIL # BLD: 0.2 K/UL (ref 0–0.6)
EOSINOPHIL NFR BLD: 2.2 %
GFR SERPLBLD CREATININE-BSD FMLA CKD-EPI: >60 ML/MIN/{1.73_M2}
GLUCOSE SERPL-MCNC: 107 MG/DL (ref 70–99)
HCT VFR BLD AUTO: 47.2 % (ref 40.5–52.5)
HDLC SERPL-MCNC: 47 MG/DL (ref 40–60)
HGB BLD-MCNC: 16.1 G/DL (ref 13.5–17.5)
LDLC SERPL CALC-MCNC: 115 MG/DL
LYMPHOCYTES # BLD: 1.6 K/UL (ref 1–5.1)
LYMPHOCYTES NFR BLD: 20.3 %
MCH RBC QN AUTO: 33.3 PG (ref 26–34)
MCHC RBC AUTO-ENTMCNC: 34.1 G/DL (ref 31–36)
MCV RBC AUTO: 97.7 FL (ref 80–100)
MONOCYTES # BLD: 0.7 K/UL (ref 0–1.3)
MONOCYTES NFR BLD: 9 %
NEUTROPHILS # BLD: 5.4 K/UL (ref 1.7–7.7)
NEUTROPHILS NFR BLD: 67 %
PLATELET # BLD AUTO: 221 K/UL (ref 135–450)
PMV BLD AUTO: 9.5 FL (ref 5–10.5)
POTASSIUM SERPL-SCNC: 4.5 MMOL/L (ref 3.5–5.1)
PROT SERPL-MCNC: 6.8 G/DL (ref 6.4–8.2)
RBC # BLD AUTO: 4.84 M/UL (ref 4.2–5.9)
SODIUM SERPL-SCNC: 139 MMOL/L (ref 136–145)
TRIGL SERPL-MCNC: 126 MG/DL (ref 0–150)
TSH SERPL DL<=0.005 MIU/L-ACNC: 2.3 UIU/ML (ref 0.27–4.2)
VLDLC SERPL CALC-MCNC: 25 MG/DL
WBC # BLD AUTO: 8 K/UL (ref 4–11)

## 2023-05-16 LAB
EST. AVERAGE GLUCOSE BLD GHB EST-MCNC: 122.6 MG/DL
HBA1C MFR BLD: 5.9 %

## 2023-05-24 ENCOUNTER — ANTI-COAG VISIT (OUTPATIENT)
Dept: PHARMACY | Age: 78
End: 2023-05-24
Payer: MEDICARE

## 2023-05-24 DIAGNOSIS — I48.20 CHRONIC ATRIAL FIBRILLATION (HCC): Primary | ICD-10-CM

## 2023-05-24 LAB — INTERNATIONAL NORMALIZATION RATIO, POC: 1.9

## 2023-05-24 PROCEDURE — 85610 PROTHROMBIN TIME: CPT | Performed by: PHARMACIST

## 2023-05-24 PROCEDURE — 99211 OFF/OP EST MAY X REQ PHY/QHP: CPT | Performed by: PHARMACIST

## 2023-05-24 NOTE — PROGRESS NOTES
ANTICOAGULATION SERVICE    Gay Mcrae is a 68 y.o. male with PMHx significant for A-fib, HTN, HLD who presents to clinic 5/24/2023 for anticoagulation monitoring and adjustment.     Anticoagulation Indication(s):  Afib    Referring Physician:  Dr. Lauro Sawant  Goal INR Range:  1.8-2.2, but 1.5-2.5 acceptable as patient refuses frequent INR monitoring  Duration of Anticoagulation Therapy:  Indefinite  Time of day dose taken:  PM  Product patient has at home:  warfarin 5 mg (peach)    [id number] Score for Atrial Fibrillation Stroke Risk   Risk   Factors  Component Value   C CHF No 0   H HTN Yes 1   A2 Age >= 76 Yes,  (79 y.o.) 2   D DM No 0   S2 Prior Stroke/TIA No 0   V Vascular Disease No 0   A Age 74-69 No,  (79 y.o.) 0   Sc Sex male 0    [id number]  Score  3   Score last updated 1/9/20 9:42 AM    INR Summary                            Warfarin regimen (mg)  Date INR   A/P    Sun Mon Tue Wed Thu Fri Sat Mg/wk  5/24 1.9 At goal, continue   5 5 5 5 5 5 5 35  3/29 2.5 At goal, continue   5 5 5 5 5 5 5 35  2/1 2.1 At goal, continue   5 5 5 5 5 5 5 35  12/7 1.7 Below goal, continue   5 5 5 5 5 5 5 35  10/12 2.5 Above goal, continue   5 5 5 5 5 5 5 35  8/17 1.7 Below goal, continue  5 5 5 5 5 5 5 35  6/22 2.0 At goal, no change  5 5 5 5 5 5 5 35  4/27 2.2 At goal, no change  5 5 5 5 5 5 5 35  3/2 1.9 At goal, no change  5 5 5 5 5 5 5 35  1/12 1.8 At goal, no change  5 5 5 5 5 5 5 35   12/1 1.5 Below goal, continue  5 5 5 5 5 5 5 35  10/18 1.5 Below goal, continue  5 5 5 5 5 5 5 35  2/2021-9/2021 OFF WARFARIN d/t rectal bleeding   1/19 1.8 At goal, no change  5 5 5 5 5 5 5 35  12/1 2.6 Above goal, continue  5 5 5 5 5 5 5 35  10/13 2.3 At goal, no change  5 5 5 5 5 5 5 35  9/1 2.5 Above goal, continue  5 5 5 5 5 5 5 35  7/21 2.2 At goal, no change  5 5 5 5 5 5 5 35  5/12 2.6 Above goal, decrease  5 5 5 5 5 5 5 35  2/19 2.5 At goal, no change  5 5 5 5 5 7.5 5 37.5  1/9 2.0 At goal, no

## 2023-05-30 ENCOUNTER — HOSPITAL ENCOUNTER (INPATIENT)
Age: 78
LOS: 9 days | Discharge: HOME | End: 2023-06-08
Attending: STUDENT IN AN ORGANIZED HEALTH CARE EDUCATION/TRAINING PROGRAM | Admitting: HOSPITALIST
Payer: MEDICARE

## 2023-05-30 ENCOUNTER — TELEPHONE (OUTPATIENT)
Dept: INTERNAL MEDICINE CLINIC | Age: 78
End: 2023-05-30

## 2023-05-30 ENCOUNTER — APPOINTMENT (OUTPATIENT)
Dept: GENERAL RADIOLOGY | Age: 78
End: 2023-05-30
Payer: MEDICARE

## 2023-05-30 ENCOUNTER — APPOINTMENT (OUTPATIENT)
Dept: CT IMAGING | Age: 78
End: 2023-05-30
Payer: MEDICARE

## 2023-05-30 ENCOUNTER — APPOINTMENT (OUTPATIENT)
Dept: MRI IMAGING | Age: 78
End: 2023-05-30
Payer: MEDICARE

## 2023-05-30 DIAGNOSIS — R27.8 DYSMETRIA: Primary | ICD-10-CM

## 2023-05-30 PROBLEM — R29.898 RIGHT HAND WEAKNESS: Status: ACTIVE | Noted: 2023-05-30

## 2023-05-30 PROBLEM — R29.898 UPPER EXTREMITY WEAKNESS: Status: ACTIVE | Noted: 2023-05-30

## 2023-05-30 LAB
ANION GAP SERPL CALCULATED.3IONS-SCNC: 8 MMOL/L (ref 3–16)
BASOPHILS # BLD: 0.1 K/UL (ref 0–0.2)
BASOPHILS NFR BLD: 1.3 %
BUN SERPL-MCNC: 14 MG/DL (ref 7–20)
CALCIUM SERPL-MCNC: 9 MG/DL (ref 8.3–10.6)
CHLORIDE SERPL-SCNC: 101 MMOL/L (ref 99–110)
CO2 SERPL-SCNC: 26 MMOL/L (ref 21–32)
CREAT SERPL-MCNC: 0.5 MG/DL (ref 0.8–1.3)
DEPRECATED RDW RBC AUTO: 13.3 % (ref 12.4–15.4)
EKG DIAGNOSIS: NORMAL
EKG Q-T INTERVAL: 370 MS
EKG QRS DURATION: 86 MS
EKG QTC CALCULATION (BAZETT): 437 MS
EKG R AXIS: -36 DEGREES
EKG T AXIS: -3 DEGREES
EKG VENTRICULAR RATE: 84 BPM
EOSINOPHIL # BLD: 0.1 K/UL (ref 0–0.6)
EOSINOPHIL NFR BLD: 1.1 %
GFR SERPLBLD CREATININE-BSD FMLA CKD-EPI: >60 ML/MIN/{1.73_M2}
GLUCOSE SERPL-MCNC: 166 MG/DL (ref 70–99)
HCT VFR BLD AUTO: 46.7 % (ref 40.5–52.5)
HGB BLD-MCNC: 15.7 G/DL (ref 13.5–17.5)
INR PPP: 2.43 (ref 0.84–1.16)
LYMPHOCYTES # BLD: 1.2 K/UL (ref 1–5.1)
LYMPHOCYTES NFR BLD: 14.2 %
MCH RBC QN AUTO: 31.3 PG (ref 26–34)
MCHC RBC AUTO-ENTMCNC: 33.6 G/DL (ref 31–36)
MCV RBC AUTO: 93.2 FL (ref 80–100)
MONOCYTES # BLD: 0.7 K/UL (ref 0–1.3)
MONOCYTES NFR BLD: 8.7 %
NEUTROPHILS # BLD: 6.4 K/UL (ref 1.7–7.7)
NEUTROPHILS NFR BLD: 74.7 %
PLATELET # BLD AUTO: 251 K/UL (ref 135–450)
PMV BLD AUTO: 9.2 FL (ref 5–10.5)
POTASSIUM SERPL-SCNC: 4.8 MMOL/L (ref 3.5–5.1)
PROTHROMBIN TIME: 26.3 SEC (ref 11.5–14.8)
RBC # BLD AUTO: 5 M/UL (ref 4.2–5.9)
SODIUM SERPL-SCNC: 135 MMOL/L (ref 136–145)
WBC # BLD AUTO: 8.5 K/UL (ref 4–11)

## 2023-05-30 PROCEDURE — 85025 COMPLETE CBC W/AUTO DIFF WBC: CPT

## 2023-05-30 PROCEDURE — 85610 PROTHROMBIN TIME: CPT

## 2023-05-30 PROCEDURE — 6370000000 HC RX 637 (ALT 250 FOR IP)

## 2023-05-30 PROCEDURE — 80048 BASIC METABOLIC PNL TOTAL CA: CPT

## 2023-05-30 PROCEDURE — 1200000000 HC SEMI PRIVATE

## 2023-05-30 PROCEDURE — 99223 1ST HOSP IP/OBS HIGH 75: CPT | Performed by: SURGERY

## 2023-05-30 PROCEDURE — 36415 COLL VENOUS BLD VENIPUNCTURE: CPT

## 2023-05-30 PROCEDURE — 99223 1ST HOSP IP/OBS HIGH 75: CPT | Performed by: PSYCHIATRY & NEUROLOGY

## 2023-05-30 PROCEDURE — 84443 ASSAY THYROID STIM HORMONE: CPT

## 2023-05-30 PROCEDURE — 2580000003 HC RX 258

## 2023-05-30 PROCEDURE — 70450 CT HEAD/BRAIN W/O DYE: CPT

## 2023-05-30 PROCEDURE — 93005 ELECTROCARDIOGRAM TRACING: CPT | Performed by: STUDENT IN AN ORGANIZED HEALTH CARE EDUCATION/TRAINING PROGRAM

## 2023-05-30 PROCEDURE — 71046 X-RAY EXAM CHEST 2 VIEWS: CPT

## 2023-05-30 PROCEDURE — 99285 EMERGENCY DEPT VISIT HI MDM: CPT

## 2023-05-30 PROCEDURE — 70551 MRI BRAIN STEM W/O DYE: CPT

## 2023-05-30 PROCEDURE — 6360000004 HC RX CONTRAST MEDICATION: Performed by: STUDENT IN AN ORGANIZED HEALTH CARE EDUCATION/TRAINING PROGRAM

## 2023-05-30 PROCEDURE — 70498 CT ANGIOGRAPHY NECK: CPT

## 2023-05-30 RX ORDER — ACETAMINOPHEN 500 MG
1000 TABLET ORAL EVERY 6 HOURS PRN
Status: DISCONTINUED | OUTPATIENT
Start: 2023-05-30 | End: 2023-06-08 | Stop reason: HOSPADM

## 2023-05-30 RX ORDER — SODIUM CHLORIDE 0.9 % (FLUSH) 0.9 %
5-40 SYRINGE (ML) INJECTION EVERY 12 HOURS SCHEDULED
Status: DISCONTINUED | OUTPATIENT
Start: 2023-05-30 | End: 2023-06-08 | Stop reason: HOSPADM

## 2023-05-30 RX ORDER — SODIUM CHLORIDE 9 MG/ML
INJECTION, SOLUTION INTRAVENOUS CONTINUOUS
Status: ACTIVE | OUTPATIENT
Start: 2023-05-31 | End: 2023-05-31

## 2023-05-30 RX ORDER — ACETAMINOPHEN 650 MG/1
650 SUPPOSITORY RECTAL EVERY 6 HOURS PRN
Status: DISCONTINUED | OUTPATIENT
Start: 2023-05-30 | End: 2023-06-08 | Stop reason: HOSPADM

## 2023-05-30 RX ORDER — SODIUM CHLORIDE 9 MG/ML
INJECTION, SOLUTION INTRAVENOUS PRN
Status: DISCONTINUED | OUTPATIENT
Start: 2023-05-30 | End: 2023-06-08 | Stop reason: HOSPADM

## 2023-05-30 RX ORDER — ONDANSETRON 4 MG/1
4 TABLET, ORALLY DISINTEGRATING ORAL EVERY 8 HOURS PRN
Status: DISCONTINUED | OUTPATIENT
Start: 2023-05-30 | End: 2023-06-08 | Stop reason: HOSPADM

## 2023-05-30 RX ORDER — LEVOTHYROXINE SODIUM 0.12 MG/1
125 TABLET ORAL DAILY
Status: DISCONTINUED | OUTPATIENT
Start: 2023-05-31 | End: 2023-06-08 | Stop reason: HOSPADM

## 2023-05-30 RX ORDER — ONDANSETRON 2 MG/ML
4 INJECTION INTRAMUSCULAR; INTRAVENOUS EVERY 6 HOURS PRN
Status: DISCONTINUED | OUTPATIENT
Start: 2023-05-30 | End: 2023-06-08 | Stop reason: HOSPADM

## 2023-05-30 RX ORDER — PRAVASTATIN SODIUM 10 MG
20 TABLET ORAL DAILY
Status: DISCONTINUED | OUTPATIENT
Start: 2023-05-31 | End: 2023-06-08 | Stop reason: HOSPADM

## 2023-05-30 RX ORDER — SODIUM CHLORIDE 0.9 % (FLUSH) 0.9 %
5-40 SYRINGE (ML) INJECTION PRN
Status: DISCONTINUED | OUTPATIENT
Start: 2023-05-30 | End: 2023-06-08 | Stop reason: HOSPADM

## 2023-05-30 RX ORDER — POLYETHYLENE GLYCOL 3350 17 G/17G
17 POWDER, FOR SOLUTION ORAL DAILY PRN
Status: DISCONTINUED | OUTPATIENT
Start: 2023-05-30 | End: 2023-06-08

## 2023-05-30 RX ORDER — ENOXAPARIN SODIUM 100 MG/ML
30 INJECTION SUBCUTANEOUS 2 TIMES DAILY
Status: DISCONTINUED | OUTPATIENT
Start: 2023-05-30 | End: 2023-05-31

## 2023-05-30 RX ORDER — WARFARIN SODIUM 5 MG/1
5 TABLET ORAL
Status: COMPLETED | OUTPATIENT
Start: 2023-05-30 | End: 2023-05-30

## 2023-05-30 RX ORDER — ATENOLOL 50 MG/1
50 TABLET ORAL DAILY
Status: DISCONTINUED | OUTPATIENT
Start: 2023-05-31 | End: 2023-06-08 | Stop reason: HOSPADM

## 2023-05-30 RX ADMIN — IOPAMIDOL 75 ML: 755 INJECTION, SOLUTION INTRAVENOUS at 14:48

## 2023-05-30 RX ADMIN — SODIUM CHLORIDE, PRESERVATIVE FREE 10 ML: 5 INJECTION INTRAVENOUS at 22:05

## 2023-05-30 RX ADMIN — WARFARIN SODIUM 5 MG: 5 TABLET ORAL at 22:02

## 2023-05-30 RX ADMIN — SODIUM CHLORIDE: 9 INJECTION, SOLUTION INTRAVENOUS at 23:20

## 2023-05-30 ASSESSMENT — ENCOUNTER SYMPTOMS
RHINORRHEA: 0
PHOTOPHOBIA: 0
ABDOMINAL PAIN: 0
COUGH: 0
EYE PAIN: 0
VOMITING: 0
NAUSEA: 0
SHORTNESS OF BREATH: 0

## 2023-05-30 ASSESSMENT — PAIN - FUNCTIONAL ASSESSMENT: PAIN_FUNCTIONAL_ASSESSMENT: NONE - DENIES PAIN

## 2023-05-30 NOTE — TELEPHONE ENCOUNTER
Patient called, yesterday he started have weakness in his right hand. He thought he had slept on it wrong, but it is still very weak. He did his dum bell exercises, but when picking up a cup of coffee it felt weak. .. Please call and advise what he should do. ..

## 2023-05-30 NOTE — TELEPHONE ENCOUNTER
Recommend to go to the ED immediately to rule out CVA (not sure when symp started might be past the window for treatment now but still to the ED now)

## 2023-05-31 PROBLEM — R27.8 DYSMETRIA: Status: ACTIVE | Noted: 2023-05-31

## 2023-05-31 PROBLEM — I63.529 ACUTE ISCHEMIC CEREBROVASCULAR ACCIDENT (CVA) INVOLVING ANTERIOR CEREBRAL ARTERY TERRITORY (HCC): Status: ACTIVE | Noted: 2023-05-31

## 2023-05-31 PROBLEM — R20.2 TINGLING OF RIGHT UPPER EXTREMITY: Status: ACTIVE | Noted: 2023-05-31

## 2023-05-31 PROBLEM — I63.9 CEREBROVASCULAR ACCIDENT (CVA) DUE TO EMBOLISM (HCC): Status: ACTIVE | Noted: 2023-05-30

## 2023-05-31 LAB
ANION GAP SERPL CALCULATED.3IONS-SCNC: 7 MMOL/L (ref 3–16)
ANTI-XA UNFRAC HEPARIN: 0.17 IU/ML (ref 0.3–0.7)
ANTI-XA UNFRAC HEPARIN: <0.1 IU/ML (ref 0.3–0.7)
APTT BLD: 68.9 SEC (ref 22.7–35.9)
BASOPHILS # BLD: 0.1 K/UL (ref 0–0.2)
BASOPHILS NFR BLD: 0.8 %
BUN SERPL-MCNC: 12 MG/DL (ref 7–20)
CALCIUM SERPL-MCNC: 9.1 MG/DL (ref 8.3–10.6)
CHLORIDE SERPL-SCNC: 106 MMOL/L (ref 99–110)
CO2 SERPL-SCNC: 29 MMOL/L (ref 21–32)
CREAT SERPL-MCNC: 0.5 MG/DL (ref 0.8–1.3)
DEPRECATED RDW RBC AUTO: 13.4 % (ref 12.4–15.4)
EOSINOPHIL # BLD: 0.1 K/UL (ref 0–0.6)
EOSINOPHIL NFR BLD: 2.1 %
GFR SERPLBLD CREATININE-BSD FMLA CKD-EPI: >60 ML/MIN/{1.73_M2}
GLUCOSE SERPL-MCNC: 97 MG/DL (ref 70–99)
HCT VFR BLD AUTO: 44.4 % (ref 40.5–52.5)
HGB BLD-MCNC: 15 G/DL (ref 13.5–17.5)
INR PPP: 2.09 (ref 0.84–1.16)
INR PPP: 2.15 (ref 0.84–1.16)
LV EF: 58 %
LVEF MODALITY: NORMAL
LYMPHOCYTES # BLD: 1.3 K/UL (ref 1–5.1)
LYMPHOCYTES NFR BLD: 18.5 %
MAGNESIUM SERPL-MCNC: 1.9 MG/DL (ref 1.8–2.4)
MCH RBC QN AUTO: 31.4 PG (ref 26–34)
MCHC RBC AUTO-ENTMCNC: 33.6 G/DL (ref 31–36)
MCV RBC AUTO: 93.3 FL (ref 80–100)
MONOCYTES # BLD: 0.7 K/UL (ref 0–1.3)
MONOCYTES NFR BLD: 10.1 %
NEUTROPHILS # BLD: 4.9 K/UL (ref 1.7–7.7)
NEUTROPHILS NFR BLD: 68.5 %
PLATELET # BLD AUTO: 223 K/UL (ref 135–450)
PMV BLD AUTO: 8.7 FL (ref 5–10.5)
POTASSIUM SERPL-SCNC: 4.1 MMOL/L (ref 3.5–5.1)
PROTHROMBIN TIME: 23.4 SEC (ref 11.5–14.8)
PROTHROMBIN TIME: 23.9 SEC (ref 11.5–14.8)
RBC # BLD AUTO: 4.76 M/UL (ref 4.2–5.9)
SODIUM SERPL-SCNC: 142 MMOL/L (ref 136–145)
TSH SERPL DL<=0.005 MIU/L-ACNC: 3.12 UIU/ML (ref 0.27–4.2)
WBC # BLD AUTO: 7.1 K/UL (ref 4–11)

## 2023-05-31 PROCEDURE — 85730 THROMBOPLASTIN TIME PARTIAL: CPT

## 2023-05-31 PROCEDURE — 36415 COLL VENOUS BLD VENIPUNCTURE: CPT

## 2023-05-31 PROCEDURE — 97535 SELF CARE MNGMENT TRAINING: CPT

## 2023-05-31 PROCEDURE — C8929 TTE W OR WO FOL WCON,DOPPLER: HCPCS

## 2023-05-31 PROCEDURE — 99232 SBSQ HOSP IP/OBS MODERATE 35: CPT | Performed by: NURSE PRACTITIONER

## 2023-05-31 PROCEDURE — 6360000002 HC RX W HCPCS: Performed by: SURGERY

## 2023-05-31 PROCEDURE — 85610 PROTHROMBIN TIME: CPT

## 2023-05-31 PROCEDURE — 97166 OT EVAL MOD COMPLEX 45 MIN: CPT

## 2023-05-31 PROCEDURE — 99222 1ST HOSP IP/OBS MODERATE 55: CPT | Performed by: HOSPITALIST

## 2023-05-31 PROCEDURE — 83735 ASSAY OF MAGNESIUM: CPT

## 2023-05-31 PROCEDURE — 85520 HEPARIN ASSAY: CPT

## 2023-05-31 PROCEDURE — 97530 THERAPEUTIC ACTIVITIES: CPT

## 2023-05-31 PROCEDURE — 1200000000 HC SEMI PRIVATE

## 2023-05-31 PROCEDURE — 97162 PT EVAL MOD COMPLEX 30 MIN: CPT

## 2023-05-31 PROCEDURE — 85025 COMPLETE CBC W/AUTO DIFF WBC: CPT

## 2023-05-31 PROCEDURE — 6370000000 HC RX 637 (ALT 250 FOR IP)

## 2023-05-31 PROCEDURE — 97116 GAIT TRAINING THERAPY: CPT

## 2023-05-31 PROCEDURE — 2580000003 HC RX 258

## 2023-05-31 PROCEDURE — 2060000000 HC ICU INTERMEDIATE R&B

## 2023-05-31 PROCEDURE — 80048 BASIC METABOLIC PNL TOTAL CA: CPT

## 2023-05-31 RX ORDER — HEPARIN SODIUM 10000 [USP'U]/100ML
5-30 INJECTION, SOLUTION INTRAVENOUS CONTINUOUS
Status: DISCONTINUED | OUTPATIENT
Start: 2023-05-31 | End: 2023-06-01

## 2023-05-31 RX ADMIN — SODIUM CHLORIDE, PRESERVATIVE FREE 10 ML: 5 INJECTION INTRAVENOUS at 20:31

## 2023-05-31 RX ADMIN — LEVOTHYROXINE SODIUM 125 MCG: 125 TABLET ORAL at 07:01

## 2023-05-31 RX ADMIN — HEPARIN SODIUM 9 UNITS/KG/HR: 10000 INJECTION, SOLUTION INTRAVENOUS at 18:43

## 2023-06-01 ENCOUNTER — TELEPHONE (OUTPATIENT)
Dept: INTERNAL MEDICINE CLINIC | Age: 78
End: 2023-06-01

## 2023-06-01 LAB
ANION GAP SERPL CALCULATED.3IONS-SCNC: 9 MMOL/L (ref 3–16)
ANTI-XA UNFRAC HEPARIN: 0.5 IU/ML (ref 0.3–0.7)
ANTI-XA UNFRAC HEPARIN: 0.56 IU/ML (ref 0.3–0.7)
BASOPHILS # BLD: 0.1 K/UL (ref 0–0.2)
BASOPHILS NFR BLD: 1 %
BUN SERPL-MCNC: 11 MG/DL (ref 7–20)
CALCIUM SERPL-MCNC: 9.5 MG/DL (ref 8.3–10.6)
CHLORIDE SERPL-SCNC: 103 MMOL/L (ref 99–110)
CO2 SERPL-SCNC: 27 MMOL/L (ref 21–32)
CREAT SERPL-MCNC: <0.5 MG/DL (ref 0.8–1.3)
DEPRECATED RDW RBC AUTO: 13.4 % (ref 12.4–15.4)
EOSINOPHIL # BLD: 0.1 K/UL (ref 0–0.6)
EOSINOPHIL NFR BLD: 1.5 %
GFR SERPLBLD CREATININE-BSD FMLA CKD-EPI: >60 ML/MIN/{1.73_M2}
GLUCOSE SERPL-MCNC: 109 MG/DL (ref 70–99)
HCT VFR BLD AUTO: 46.1 % (ref 40.5–52.5)
HGB BLD-MCNC: 15.7 G/DL (ref 13.5–17.5)
INR PPP: 2.07 (ref 0.84–1.16)
LYMPHOCYTES # BLD: 1.4 K/UL (ref 1–5.1)
LYMPHOCYTES NFR BLD: 18.3 %
MAGNESIUM SERPL-MCNC: 1.9 MG/DL (ref 1.8–2.4)
MCH RBC QN AUTO: 31.3 PG (ref 26–34)
MCHC RBC AUTO-ENTMCNC: 34 G/DL (ref 31–36)
MCV RBC AUTO: 92.2 FL (ref 80–100)
MONOCYTES # BLD: 0.8 K/UL (ref 0–1.3)
MONOCYTES NFR BLD: 10 %
NEUTROPHILS # BLD: 5.5 K/UL (ref 1.7–7.7)
NEUTROPHILS NFR BLD: 69.2 %
PLATELET # BLD AUTO: 240 K/UL (ref 135–450)
PMV BLD AUTO: 8.9 FL (ref 5–10.5)
POTASSIUM SERPL-SCNC: 4.5 MMOL/L (ref 3.5–5.1)
PROTHROMBIN TIME: 23.2 SEC (ref 11.5–14.8)
RBC # BLD AUTO: 5 M/UL (ref 4.2–5.9)
SODIUM SERPL-SCNC: 139 MMOL/L (ref 136–145)
WBC # BLD AUTO: 7.9 K/UL (ref 4–11)

## 2023-06-01 PROCEDURE — 6360000002 HC RX W HCPCS: Performed by: STUDENT IN AN ORGANIZED HEALTH CARE EDUCATION/TRAINING PROGRAM

## 2023-06-01 PROCEDURE — 36415 COLL VENOUS BLD VENIPUNCTURE: CPT

## 2023-06-01 PROCEDURE — 80048 BASIC METABOLIC PNL TOTAL CA: CPT

## 2023-06-01 PROCEDURE — 85520 HEPARIN ASSAY: CPT

## 2023-06-01 PROCEDURE — 85610 PROTHROMBIN TIME: CPT

## 2023-06-01 PROCEDURE — 83735 ASSAY OF MAGNESIUM: CPT

## 2023-06-01 PROCEDURE — 2060000000 HC ICU INTERMEDIATE R&B

## 2023-06-01 PROCEDURE — 6370000000 HC RX 637 (ALT 250 FOR IP)

## 2023-06-01 PROCEDURE — 99232 SBSQ HOSP IP/OBS MODERATE 35: CPT | Performed by: HOSPITALIST

## 2023-06-01 PROCEDURE — 85025 COMPLETE CBC W/AUTO DIFF WBC: CPT

## 2023-06-01 PROCEDURE — 2580000003 HC RX 258

## 2023-06-01 RX ORDER — ENOXAPARIN SODIUM 150 MG/ML
1 INJECTION SUBCUTANEOUS 2 TIMES DAILY
Status: DISCONTINUED | OUTPATIENT
Start: 2023-06-01 | End: 2023-06-02

## 2023-06-01 RX ADMIN — ENOXAPARIN SODIUM 105 MG: 150 INJECTION SUBCUTANEOUS at 14:41

## 2023-06-01 RX ADMIN — ATENOLOL 50 MG: 50 TABLET ORAL at 08:30

## 2023-06-01 RX ADMIN — PRAVASTATIN SODIUM 20 MG: 20 TABLET ORAL at 08:30

## 2023-06-01 RX ADMIN — LEVOTHYROXINE SODIUM 125 MCG: 125 TABLET ORAL at 06:21

## 2023-06-01 RX ADMIN — SODIUM CHLORIDE, PRESERVATIVE FREE 10 ML: 5 INJECTION INTRAVENOUS at 20:10

## 2023-06-01 RX ADMIN — ENOXAPARIN SODIUM 105 MG: 150 INJECTION SUBCUTANEOUS at 20:08

## 2023-06-01 NOTE — TELEPHONE ENCOUNTER
[facility]  He would like to take his medications from home, instead of those in the hospital because they are cheaper. Would Dr. Cynthia Harrison provide a consent for him to do this? ?    Please call and advise

## 2023-06-01 NOTE — TELEPHONE ENCOUNTER
Explained to patient that while Patient is in the hospital Dr. Leland Sanchez will be unable to change or give any medications. Should follow-up with Dr. Leland Sanchez when he is discharged from the hospital    Patient verbalized understanding just wanted to make sure we know he is on social security and prefers not to get any new meds he has to pay out of pocket for.      Will make a follow-up when he is out of the hospital.

## 2023-06-02 LAB
ANION GAP SERPL CALCULATED.3IONS-SCNC: 7 MMOL/L (ref 3–16)
BASOPHILS # BLD: 0.1 K/UL (ref 0–0.2)
BASOPHILS NFR BLD: 0.9 %
BUN SERPL-MCNC: 11 MG/DL (ref 7–20)
CALCIUM SERPL-MCNC: 9.4 MG/DL (ref 8.3–10.6)
CHLORIDE SERPL-SCNC: 103 MMOL/L (ref 99–110)
CO2 SERPL-SCNC: 29 MMOL/L (ref 21–32)
CREAT SERPL-MCNC: 0.6 MG/DL (ref 0.8–1.3)
DEPRECATED RDW RBC AUTO: 13.3 % (ref 12.4–15.4)
EOSINOPHIL # BLD: 0.2 K/UL (ref 0–0.6)
EOSINOPHIL NFR BLD: 2.5 %
GFR SERPLBLD CREATININE-BSD FMLA CKD-EPI: >60 ML/MIN/{1.73_M2}
GLUCOSE SERPL-MCNC: 95 MG/DL (ref 70–99)
HCT VFR BLD AUTO: 45.5 % (ref 40.5–52.5)
HGB BLD-MCNC: 15.4 G/DL (ref 13.5–17.5)
INR PPP: 1.61 (ref 0.84–1.16)
LYMPHOCYTES # BLD: 1.8 K/UL (ref 1–5.1)
LYMPHOCYTES NFR BLD: 24.6 %
MAGNESIUM SERPL-MCNC: 1.9 MG/DL (ref 1.8–2.4)
MCH RBC QN AUTO: 31.3 PG (ref 26–34)
MCHC RBC AUTO-ENTMCNC: 33.8 G/DL (ref 31–36)
MCV RBC AUTO: 92.6 FL (ref 80–100)
MONOCYTES # BLD: 0.7 K/UL (ref 0–1.3)
MONOCYTES NFR BLD: 10.1 %
NEUTROPHILS # BLD: 4.5 K/UL (ref 1.7–7.7)
NEUTROPHILS NFR BLD: 61.9 %
PHOSPHATE SERPL-MCNC: 3 MG/DL (ref 2.5–4.9)
PLATELET # BLD AUTO: 236 K/UL (ref 135–450)
PMV BLD AUTO: 9.2 FL (ref 5–10.5)
POTASSIUM SERPL-SCNC: 4.2 MMOL/L (ref 3.5–5.1)
PROTHROMBIN TIME: 19.1 SEC (ref 11.5–14.8)
RBC # BLD AUTO: 4.91 M/UL (ref 4.2–5.9)
SODIUM SERPL-SCNC: 139 MMOL/L (ref 136–145)
WBC # BLD AUTO: 7.2 K/UL (ref 4–11)

## 2023-06-02 PROCEDURE — 36415 COLL VENOUS BLD VENIPUNCTURE: CPT

## 2023-06-02 PROCEDURE — 80048 BASIC METABOLIC PNL TOTAL CA: CPT

## 2023-06-02 PROCEDURE — 84100 ASSAY OF PHOSPHORUS: CPT

## 2023-06-02 PROCEDURE — 99232 SBSQ HOSP IP/OBS MODERATE 35: CPT | Performed by: HOSPITALIST

## 2023-06-02 PROCEDURE — 2060000000 HC ICU INTERMEDIATE R&B

## 2023-06-02 PROCEDURE — 2580000003 HC RX 258

## 2023-06-02 PROCEDURE — 6360000002 HC RX W HCPCS: Performed by: STUDENT IN AN ORGANIZED HEALTH CARE EDUCATION/TRAINING PROGRAM

## 2023-06-02 PROCEDURE — 6370000000 HC RX 637 (ALT 250 FOR IP)

## 2023-06-02 PROCEDURE — 83735 ASSAY OF MAGNESIUM: CPT

## 2023-06-02 PROCEDURE — 6360000002 HC RX W HCPCS

## 2023-06-02 PROCEDURE — 85610 PROTHROMBIN TIME: CPT

## 2023-06-02 PROCEDURE — 85025 COMPLETE CBC W/AUTO DIFF WBC: CPT

## 2023-06-02 RX ORDER — ENOXAPARIN SODIUM 100 MG/ML
0.9 INJECTION SUBCUTANEOUS 2 TIMES DAILY
Status: COMPLETED | OUTPATIENT
Start: 2023-06-02 | End: 2023-06-04

## 2023-06-02 RX ADMIN — LEVOTHYROXINE SODIUM 125 MCG: 125 TABLET ORAL at 05:15

## 2023-06-02 RX ADMIN — SODIUM CHLORIDE, PRESERVATIVE FREE 10 ML: 5 INJECTION INTRAVENOUS at 21:00

## 2023-06-02 RX ADMIN — ENOXAPARIN SODIUM 105 MG: 150 INJECTION SUBCUTANEOUS at 08:40

## 2023-06-02 RX ADMIN — ENOXAPARIN SODIUM 100 MG: 100 INJECTION SUBCUTANEOUS at 21:01

## 2023-06-02 RX ADMIN — SODIUM CHLORIDE, PRESERVATIVE FREE 10 ML: 5 INJECTION INTRAVENOUS at 08:41

## 2023-06-02 RX ADMIN — PRAVASTATIN SODIUM 20 MG: 20 TABLET ORAL at 08:40

## 2023-06-02 RX ADMIN — ATENOLOL 50 MG: 50 TABLET ORAL at 08:40

## 2023-06-03 LAB
ANION GAP SERPL CALCULATED.3IONS-SCNC: 8 MMOL/L (ref 3–16)
BASOPHILS # BLD: 0.1 K/UL (ref 0–0.2)
BASOPHILS NFR BLD: 1 %
BUN SERPL-MCNC: 15 MG/DL (ref 7–20)
CALCIUM SERPL-MCNC: 9.1 MG/DL (ref 8.3–10.6)
CHLORIDE SERPL-SCNC: 95 MMOL/L (ref 99–110)
CO2 SERPL-SCNC: 26 MMOL/L (ref 21–32)
CREAT SERPL-MCNC: 0.6 MG/DL (ref 0.8–1.3)
DEPRECATED RDW RBC AUTO: 13.1 % (ref 12.4–15.4)
EOSINOPHIL # BLD: 0.1 K/UL (ref 0–0.6)
EOSINOPHIL NFR BLD: 1.8 %
GFR SERPLBLD CREATININE-BSD FMLA CKD-EPI: >60 ML/MIN/{1.73_M2}
GLUCOSE SERPL-MCNC: 95 MG/DL (ref 70–99)
HCT VFR BLD AUTO: 45.3 % (ref 40.5–52.5)
HGB BLD-MCNC: 15.2 G/DL (ref 13.5–17.5)
LYMPHOCYTES # BLD: 1.3 K/UL (ref 1–5.1)
LYMPHOCYTES NFR BLD: 17.5 %
MAGNESIUM SERPL-MCNC: 1.7 MG/DL (ref 1.8–2.4)
MCH RBC QN AUTO: 31.4 PG (ref 26–34)
MCHC RBC AUTO-ENTMCNC: 33.6 G/DL (ref 31–36)
MCV RBC AUTO: 93.4 FL (ref 80–100)
MONOCYTES # BLD: 0.8 K/UL (ref 0–1.3)
MONOCYTES NFR BLD: 10.7 %
NEUTROPHILS # BLD: 5.3 K/UL (ref 1.7–7.7)
NEUTROPHILS NFR BLD: 69 %
PHOSPHATE SERPL-MCNC: 3 MG/DL (ref 2.5–4.9)
PLATELET # BLD AUTO: 246 K/UL (ref 135–450)
PMV BLD AUTO: 9.5 FL (ref 5–10.5)
POTASSIUM SERPL-SCNC: 3.7 MMOL/L (ref 3.5–5.1)
RBC # BLD AUTO: 4.85 M/UL (ref 4.2–5.9)
SODIUM SERPL-SCNC: 129 MMOL/L (ref 136–145)
WBC # BLD AUTO: 7.7 K/UL (ref 4–11)

## 2023-06-03 PROCEDURE — 85025 COMPLETE CBC W/AUTO DIFF WBC: CPT

## 2023-06-03 PROCEDURE — 99232 SBSQ HOSP IP/OBS MODERATE 35: CPT | Performed by: HOSPITALIST

## 2023-06-03 PROCEDURE — 6370000000 HC RX 637 (ALT 250 FOR IP)

## 2023-06-03 PROCEDURE — 83735 ASSAY OF MAGNESIUM: CPT

## 2023-06-03 PROCEDURE — 84100 ASSAY OF PHOSPHORUS: CPT

## 2023-06-03 PROCEDURE — 2060000000 HC ICU INTERMEDIATE R&B

## 2023-06-03 PROCEDURE — 2580000003 HC RX 258

## 2023-06-03 PROCEDURE — 6360000002 HC RX W HCPCS

## 2023-06-03 PROCEDURE — 6370000000 HC RX 637 (ALT 250 FOR IP): Performed by: STUDENT IN AN ORGANIZED HEALTH CARE EDUCATION/TRAINING PROGRAM

## 2023-06-03 PROCEDURE — 36415 COLL VENOUS BLD VENIPUNCTURE: CPT

## 2023-06-03 PROCEDURE — 2500000003 HC RX 250 WO HCPCS: Performed by: STUDENT IN AN ORGANIZED HEALTH CARE EDUCATION/TRAINING PROGRAM

## 2023-06-03 PROCEDURE — 80048 BASIC METABOLIC PNL TOTAL CA: CPT

## 2023-06-03 RX ORDER — HYDROMORPHONE HYDROCHLORIDE 1 MG/ML
0.25 INJECTION, SOLUTION INTRAMUSCULAR; INTRAVENOUS; SUBCUTANEOUS ONCE
Status: COMPLETED | OUTPATIENT
Start: 2023-06-03 | End: 2023-06-03

## 2023-06-03 RX ORDER — HYDROMORPHONE HYDROCHLORIDE 1 MG/ML
0.25 INJECTION, SOLUTION INTRAMUSCULAR; INTRAVENOUS; SUBCUTANEOUS ONCE
Status: DISCONTINUED | OUTPATIENT
Start: 2023-06-03 | End: 2023-06-03

## 2023-06-03 RX ORDER — LANOLIN ALCOHOL/MO/W.PET/CERES
1000 CREAM (GRAM) TOPICAL DAILY
Status: COMPLETED | OUTPATIENT
Start: 2023-06-03 | End: 2023-06-03

## 2023-06-03 RX ORDER — TRAMADOL HYDROCHLORIDE 50 MG/1
50 TABLET ORAL ONCE
Status: COMPLETED | OUTPATIENT
Start: 2023-06-03 | End: 2023-06-03

## 2023-06-03 RX ADMIN — LEVOTHYROXINE SODIUM 125 MCG: 125 TABLET ORAL at 06:26

## 2023-06-03 RX ADMIN — TRAMADOL HYDROCHLORIDE 50 MG: 50 TABLET, FILM COATED ORAL at 23:42

## 2023-06-03 RX ADMIN — SODIUM CHLORIDE, PRESERVATIVE FREE 10 ML: 5 INJECTION INTRAVENOUS at 20:58

## 2023-06-03 RX ADMIN — Medication 1000 MG: at 09:01

## 2023-06-03 RX ADMIN — ENOXAPARIN SODIUM 100 MG: 100 INJECTION SUBCUTANEOUS at 09:06

## 2023-06-03 RX ADMIN — ENOXAPARIN SODIUM 100 MG: 100 INJECTION SUBCUTANEOUS at 20:58

## 2023-06-03 RX ADMIN — HYDROMORPHONE HYDROCHLORIDE 0.25 MG: 1 INJECTION, SOLUTION INTRAMUSCULAR; INTRAVENOUS; SUBCUTANEOUS at 18:46

## 2023-06-03 RX ADMIN — ATENOLOL 50 MG: 50 TABLET ORAL at 09:01

## 2023-06-03 RX ADMIN — ACETAMINOPHEN 1000 MG: 500 TABLET ORAL at 13:25

## 2023-06-03 RX ADMIN — PRAVASTATIN SODIUM 20 MG: 20 TABLET ORAL at 09:01

## 2023-06-03 RX ADMIN — SODIUM CHLORIDE, PRESERVATIVE FREE 10 ML: 5 INJECTION INTRAVENOUS at 09:03

## 2023-06-03 ASSESSMENT — PAIN DESCRIPTION - PAIN TYPE: TYPE: ACUTE PAIN

## 2023-06-03 ASSESSMENT — PAIN DESCRIPTION - LOCATION
LOCATION: ELBOW

## 2023-06-03 ASSESSMENT — PAIN DESCRIPTION - FREQUENCY: FREQUENCY: CONTINUOUS

## 2023-06-03 ASSESSMENT — PAIN - FUNCTIONAL ASSESSMENT
PAIN_FUNCTIONAL_ASSESSMENT: ACTIVITIES ARE NOT PREVENTED
PAIN_FUNCTIONAL_ASSESSMENT: PREVENTS OR INTERFERES SOME ACTIVE ACTIVITIES AND ADLS
PAIN_FUNCTIONAL_ASSESSMENT: ACTIVITIES ARE NOT PREVENTED

## 2023-06-03 ASSESSMENT — PAIN DESCRIPTION - ORIENTATION
ORIENTATION: RIGHT

## 2023-06-03 ASSESSMENT — PAIN SCALES - GENERAL
PAINLEVEL_OUTOF10: 8
PAINLEVEL_OUTOF10: 10

## 2023-06-03 ASSESSMENT — PAIN DESCRIPTION - ONSET: ONSET: ON-GOING

## 2023-06-03 ASSESSMENT — PAIN DESCRIPTION - DESCRIPTORS
DESCRIPTORS: SORE
DESCRIPTORS: SORE;STABBING
DESCRIPTORS: SORE

## 2023-06-04 LAB
ANION GAP SERPL CALCULATED.3IONS-SCNC: 8 MMOL/L (ref 3–16)
BASOPHILS # BLD: 0 K/UL (ref 0–0.2)
BASOPHILS NFR BLD: 0.5 %
BUN SERPL-MCNC: 13 MG/DL (ref 7–20)
CALCIUM SERPL-MCNC: 9.4 MG/DL (ref 8.3–10.6)
CHLORIDE SERPL-SCNC: 101 MMOL/L (ref 99–110)
CO2 SERPL-SCNC: 28 MMOL/L (ref 21–32)
CREAT SERPL-MCNC: 0.5 MG/DL (ref 0.8–1.3)
DEPRECATED RDW RBC AUTO: 13.3 % (ref 12.4–15.4)
EOSINOPHIL # BLD: 0 K/UL (ref 0–0.6)
EOSINOPHIL NFR BLD: 0.5 %
GFR SERPLBLD CREATININE-BSD FMLA CKD-EPI: >60 ML/MIN/{1.73_M2}
GLUCOSE SERPL-MCNC: 105 MG/DL (ref 70–99)
HCT VFR BLD AUTO: 44.3 % (ref 40.5–52.5)
HGB BLD-MCNC: 15.1 G/DL (ref 13.5–17.5)
LYMPHOCYTES # BLD: 1.1 K/UL (ref 1–5.1)
LYMPHOCYTES NFR BLD: 11.9 %
MAGNESIUM SERPL-MCNC: 2.1 MG/DL (ref 1.8–2.4)
MCH RBC QN AUTO: 31.8 PG (ref 26–34)
MCHC RBC AUTO-ENTMCNC: 34.2 G/DL (ref 31–36)
MCV RBC AUTO: 92.9 FL (ref 80–100)
MONOCYTES # BLD: 0.9 K/UL (ref 0–1.3)
MONOCYTES NFR BLD: 10.2 %
NEUTROPHILS # BLD: 7.2 K/UL (ref 1.7–7.7)
NEUTROPHILS NFR BLD: 76.9 %
PHOSPHATE SERPL-MCNC: 2.7 MG/DL (ref 2.5–4.9)
PLATELET # BLD AUTO: 222 K/UL (ref 135–450)
PMV BLD AUTO: 9.5 FL (ref 5–10.5)
POTASSIUM SERPL-SCNC: 4.2 MMOL/L (ref 3.5–5.1)
RBC # BLD AUTO: 4.76 M/UL (ref 4.2–5.9)
SODIUM SERPL-SCNC: 137 MMOL/L (ref 136–145)
WBC # BLD AUTO: 9.3 K/UL (ref 4–11)

## 2023-06-04 PROCEDURE — 6370000000 HC RX 637 (ALT 250 FOR IP)

## 2023-06-04 PROCEDURE — 80048 BASIC METABOLIC PNL TOTAL CA: CPT

## 2023-06-04 PROCEDURE — 85025 COMPLETE CBC W/AUTO DIFF WBC: CPT

## 2023-06-04 PROCEDURE — 36415 COLL VENOUS BLD VENIPUNCTURE: CPT

## 2023-06-04 PROCEDURE — 84100 ASSAY OF PHOSPHORUS: CPT

## 2023-06-04 PROCEDURE — 6360000002 HC RX W HCPCS

## 2023-06-04 PROCEDURE — 2580000003 HC RX 258: Performed by: STUDENT IN AN ORGANIZED HEALTH CARE EDUCATION/TRAINING PROGRAM

## 2023-06-04 PROCEDURE — 83735 ASSAY OF MAGNESIUM: CPT

## 2023-06-04 PROCEDURE — 99232 SBSQ HOSP IP/OBS MODERATE 35: CPT | Performed by: HOSPITALIST

## 2023-06-04 PROCEDURE — 2060000000 HC ICU INTERMEDIATE R&B

## 2023-06-04 PROCEDURE — 2580000003 HC RX 258

## 2023-06-04 PROCEDURE — 6360000002 HC RX W HCPCS: Performed by: STUDENT IN AN ORGANIZED HEALTH CARE EDUCATION/TRAINING PROGRAM

## 2023-06-04 RX ORDER — SODIUM CHLORIDE, SODIUM LACTATE, POTASSIUM CHLORIDE, CALCIUM CHLORIDE 600; 310; 30; 20 MG/100ML; MG/100ML; MG/100ML; MG/100ML
INJECTION, SOLUTION INTRAVENOUS CONTINUOUS
Status: DISCONTINUED | OUTPATIENT
Start: 2023-06-04 | End: 2023-06-05

## 2023-06-04 RX ORDER — MORPHINE SULFATE 2 MG/ML
1 INJECTION, SOLUTION INTRAMUSCULAR; INTRAVENOUS ONCE
Status: DISCONTINUED | OUTPATIENT
Start: 2023-06-04 | End: 2023-06-08 | Stop reason: HOSPADM

## 2023-06-04 RX ORDER — OXYCODONE AND ACETAMINOPHEN 10; 325 MG/1; MG/1
1 TABLET ORAL EVERY 4 HOURS PRN
Status: DISCONTINUED | OUTPATIENT
Start: 2023-06-04 | End: 2023-06-07

## 2023-06-04 RX ADMIN — ATENOLOL 50 MG: 50 TABLET ORAL at 08:40

## 2023-06-04 RX ADMIN — POTASSIUM & SODIUM PHOSPHATES POWDER PACK 280-160-250 MG 500 MG: 280-160-250 PACK at 10:36

## 2023-06-04 RX ADMIN — ENOXAPARIN SODIUM 100 MG: 100 INJECTION SUBCUTANEOUS at 20:19

## 2023-06-04 RX ADMIN — SODIUM CHLORIDE, POTASSIUM CHLORIDE, SODIUM LACTATE AND CALCIUM CHLORIDE: 600; 310; 30; 20 INJECTION, SOLUTION INTRAVENOUS at 12:02

## 2023-06-04 RX ADMIN — SODIUM CHLORIDE, PRESERVATIVE FREE 10 ML: 5 INJECTION INTRAVENOUS at 20:23

## 2023-06-04 RX ADMIN — SODIUM CHLORIDE, POTASSIUM CHLORIDE, SODIUM LACTATE AND CALCIUM CHLORIDE: 600; 310; 30; 20 INJECTION, SOLUTION INTRAVENOUS at 23:36

## 2023-06-04 RX ADMIN — SODIUM CHLORIDE, PRESERVATIVE FREE 10 ML: 5 INJECTION INTRAVENOUS at 08:40

## 2023-06-04 RX ADMIN — ENOXAPARIN SODIUM 100 MG: 100 INJECTION SUBCUTANEOUS at 08:40

## 2023-06-04 RX ADMIN — LEVOTHYROXINE SODIUM 125 MCG: 125 TABLET ORAL at 06:50

## 2023-06-04 RX ADMIN — POTASSIUM & SODIUM PHOSPHATES POWDER PACK 280-160-250 MG 500 MG: 280-160-250 PACK at 20:20

## 2023-06-04 RX ADMIN — OXYCODONE AND ACETAMINOPHEN 1 TABLET: 325; 10 TABLET ORAL at 18:27

## 2023-06-04 RX ADMIN — PRAVASTATIN SODIUM 20 MG: 20 TABLET ORAL at 08:40

## 2023-06-04 RX ADMIN — OXYCODONE AND ACETAMINOPHEN 1 TABLET: 325; 10 TABLET ORAL at 10:35

## 2023-06-04 ASSESSMENT — PAIN SCALES - GENERAL
PAINLEVEL_OUTOF10: 10
PAINLEVEL_OUTOF10: 9
PAINLEVEL_OUTOF10: 9
PAINLEVEL_OUTOF10: 3

## 2023-06-04 ASSESSMENT — PAIN DESCRIPTION - ORIENTATION
ORIENTATION: RIGHT
ORIENTATION: RIGHT

## 2023-06-04 ASSESSMENT — PAIN DESCRIPTION - DESCRIPTORS
DESCRIPTORS: SORE;HEAVINESS
DESCRIPTORS: DISCOMFORT

## 2023-06-04 ASSESSMENT — PAIN DESCRIPTION - LOCATION
LOCATION: ELBOW
LOCATION: ELBOW;ARM

## 2023-06-04 ASSESSMENT — PAIN DESCRIPTION - FREQUENCY: FREQUENCY: CONTINUOUS

## 2023-06-04 ASSESSMENT — PAIN - FUNCTIONAL ASSESSMENT: PAIN_FUNCTIONAL_ASSESSMENT: PREVENTS OR INTERFERES SOME ACTIVE ACTIVITIES AND ADLS

## 2023-06-04 ASSESSMENT — PAIN DESCRIPTION - PAIN TYPE: TYPE: ACUTE PAIN

## 2023-06-04 ASSESSMENT — PAIN DESCRIPTION - ONSET: ONSET: ON-GOING

## 2023-06-05 ENCOUNTER — ANESTHESIA EVENT (OUTPATIENT)
Dept: OPERATING ROOM | Age: 78
End: 2023-06-05
Payer: MEDICARE

## 2023-06-05 ENCOUNTER — ANESTHESIA (OUTPATIENT)
Dept: OPERATING ROOM | Age: 78
End: 2023-06-05
Payer: MEDICARE

## 2023-06-05 ENCOUNTER — APPOINTMENT (OUTPATIENT)
Dept: GENERAL RADIOLOGY | Age: 78
End: 2023-06-05
Payer: MEDICARE

## 2023-06-05 ENCOUNTER — APPOINTMENT (OUTPATIENT)
Dept: VASCULAR LAB | Age: 78
End: 2023-06-05
Payer: MEDICARE

## 2023-06-05 PROBLEM — M25.521 RIGHT ELBOW PAIN: Status: ACTIVE | Noted: 2023-06-05

## 2023-06-05 LAB
ABO + RH BLD: NORMAL
ALBUMIN SERPL-MCNC: 3.5 G/DL (ref 3.4–5)
ANION GAP SERPL CALCULATED.3IONS-SCNC: 7 MMOL/L (ref 3–16)
BASOPHILS # BLD: 0.1 K/UL (ref 0–0.2)
BASOPHILS NFR BLD: 0.7 %
BLD GP AB SCN SERPL QL: NORMAL
BUN SERPL-MCNC: 12 MG/DL (ref 7–20)
CALCIUM SERPL-MCNC: 9.4 MG/DL (ref 8.3–10.6)
CHLORIDE SERPL-SCNC: 101 MMOL/L (ref 99–110)
CO2 SERPL-SCNC: 30 MMOL/L (ref 21–32)
CREAT SERPL-MCNC: <0.5 MG/DL (ref 0.8–1.3)
DEPRECATED RDW RBC AUTO: 13.3 % (ref 12.4–15.4)
EOSINOPHIL # BLD: 0.1 K/UL (ref 0–0.6)
EOSINOPHIL NFR BLD: 0.9 %
GFR SERPLBLD CREATININE-BSD FMLA CKD-EPI: >60 ML/MIN/{1.73_M2}
GLUCOSE SERPL-MCNC: 87 MG/DL (ref 70–99)
HCT VFR BLD AUTO: 43 % (ref 40.5–52.5)
HGB BLD-MCNC: 14.5 G/DL (ref 13.5–17.5)
INR PPP: 1.05 (ref 0.84–1.16)
LYMPHOCYTES # BLD: 1.2 K/UL (ref 1–5.1)
LYMPHOCYTES NFR BLD: 16.2 %
MAGNESIUM SERPL-MCNC: 1.9 MG/DL (ref 1.8–2.4)
MCH RBC QN AUTO: 31.4 PG (ref 26–34)
MCHC RBC AUTO-ENTMCNC: 33.7 G/DL (ref 31–36)
MCV RBC AUTO: 93.2 FL (ref 80–100)
MONOCYTES # BLD: 0.9 K/UL (ref 0–1.3)
MONOCYTES NFR BLD: 12.4 %
NEUTROPHILS # BLD: 5.2 K/UL (ref 1.7–7.7)
NEUTROPHILS NFR BLD: 69.8 %
PHOSPHATE SERPL-MCNC: 2.5 MG/DL (ref 2.5–4.9)
PLATELET # BLD AUTO: 209 K/UL (ref 135–450)
PMV BLD AUTO: 9.3 FL (ref 5–10.5)
POTASSIUM SERPL-SCNC: 4.6 MMOL/L (ref 3.5–5.1)
PROTHROMBIN TIME: 13.7 SEC (ref 11.5–14.8)
RBC # BLD AUTO: 4.61 M/UL (ref 4.2–5.9)
SODIUM SERPL-SCNC: 138 MMOL/L (ref 136–145)
WBC # BLD AUTO: 7.4 K/UL (ref 4–11)

## 2023-06-05 PROCEDURE — 86901 BLOOD TYPING SEROLOGIC RH(D): CPT

## 2023-06-05 PROCEDURE — 83735 ASSAY OF MAGNESIUM: CPT

## 2023-06-05 PROCEDURE — 36415 COLL VENOUS BLD VENIPUNCTURE: CPT

## 2023-06-05 PROCEDURE — 85025 COMPLETE CBC W/AUTO DIFF WBC: CPT

## 2023-06-05 PROCEDURE — 6370000000 HC RX 637 (ALT 250 FOR IP)

## 2023-06-05 PROCEDURE — 2060000000 HC ICU INTERMEDIATE R&B

## 2023-06-05 PROCEDURE — 2580000003 HC RX 258: Performed by: STUDENT IN AN ORGANIZED HEALTH CARE EDUCATION/TRAINING PROGRAM

## 2023-06-05 PROCEDURE — 99232 SBSQ HOSP IP/OBS MODERATE 35: CPT | Performed by: HOSPITALIST

## 2023-06-05 PROCEDURE — 73080 X-RAY EXAM OF ELBOW: CPT

## 2023-06-05 PROCEDURE — 2580000003 HC RX 258

## 2023-06-05 PROCEDURE — 86850 RBC ANTIBODY SCREEN: CPT

## 2023-06-05 PROCEDURE — 86900 BLOOD TYPING SEROLOGIC ABO: CPT

## 2023-06-05 PROCEDURE — 80069 RENAL FUNCTION PANEL: CPT

## 2023-06-05 PROCEDURE — 85610 PROTHROMBIN TIME: CPT

## 2023-06-05 PROCEDURE — 6360000002 HC RX W HCPCS: Performed by: SURGERY

## 2023-06-05 RX ORDER — ENOXAPARIN SODIUM 100 MG/ML
0.9 INJECTION SUBCUTANEOUS 2 TIMES DAILY
Status: COMPLETED | OUTPATIENT
Start: 2023-06-05 | End: 2023-06-06

## 2023-06-05 RX ADMIN — SODIUM CHLORIDE, POTASSIUM CHLORIDE, SODIUM LACTATE AND CALCIUM CHLORIDE: 600; 310; 30; 20 INJECTION, SOLUTION INTRAVENOUS at 10:13

## 2023-06-05 RX ADMIN — ENOXAPARIN SODIUM 100 MG: 100 INJECTION SUBCUTANEOUS at 15:48

## 2023-06-05 RX ADMIN — ATENOLOL 50 MG: 50 TABLET ORAL at 15:49

## 2023-06-05 RX ADMIN — SODIUM CHLORIDE, PRESERVATIVE FREE 10 ML: 5 INJECTION INTRAVENOUS at 21:21

## 2023-06-05 RX ADMIN — PRAVASTATIN SODIUM 20 MG: 20 TABLET ORAL at 15:49

## 2023-06-05 RX ADMIN — LEVOTHYROXINE SODIUM 125 MCG: 125 TABLET ORAL at 06:15

## 2023-06-05 RX ADMIN — OXYCODONE AND ACETAMINOPHEN 1 TABLET: 325; 10 TABLET ORAL at 21:18

## 2023-06-05 RX ADMIN — OXYCODONE AND ACETAMINOPHEN 1 TABLET: 325; 10 TABLET ORAL at 01:25

## 2023-06-05 RX ADMIN — ENOXAPARIN SODIUM 100 MG: 100 INJECTION SUBCUTANEOUS at 21:19

## 2023-06-05 ASSESSMENT — PAIN DESCRIPTION - PAIN TYPE
TYPE: ACUTE PAIN
TYPE: ACUTE PAIN

## 2023-06-05 ASSESSMENT — PAIN DESCRIPTION - FREQUENCY
FREQUENCY: CONTINUOUS
FREQUENCY: CONTINUOUS

## 2023-06-05 ASSESSMENT — PAIN SCALES - GENERAL
PAINLEVEL_OUTOF10: 9
PAINLEVEL_OUTOF10: 3
PAINLEVEL_OUTOF10: 8
PAINLEVEL_OUTOF10: 6

## 2023-06-05 ASSESSMENT — PAIN - FUNCTIONAL ASSESSMENT
PAIN_FUNCTIONAL_ASSESSMENT: ACTIVITIES ARE NOT PREVENTED
PAIN_FUNCTIONAL_ASSESSMENT: PREVENTS OR INTERFERES SOME ACTIVE ACTIVITIES AND ADLS

## 2023-06-05 ASSESSMENT — PAIN DESCRIPTION - DESCRIPTORS
DESCRIPTORS: ACHING
DESCRIPTORS: DISCOMFORT

## 2023-06-05 ASSESSMENT — PAIN DESCRIPTION - ORIENTATION
ORIENTATION: RIGHT
ORIENTATION: RIGHT

## 2023-06-05 ASSESSMENT — PAIN DESCRIPTION - ONSET
ONSET: ON-GOING
ONSET: ON-GOING

## 2023-06-05 ASSESSMENT — PAIN DESCRIPTION - LOCATION
LOCATION: ARM
LOCATION: ELBOW

## 2023-06-05 NOTE — CARE COORDINATION
CM following: CM notes that surgery scheduled for today has been cancelled and will be rescheduled. CM will continue to follow for post-op discharge planning needs.   Electronically signed by ORTEGA Tinoco on 6/5/2023 at 1:57 PM  [phone]

## 2023-06-05 NOTE — ANESTHESIA PRE PROCEDURE
06/05/23 0600 06/05/23 0615 06/05/23 1130   BP: (!) 144/75  (!) 163/84 (!) 165/80   Pulse: 69  85 82   Resp: 16  16 16   Temp: 98.7 °F (37.1 °C)  97.9 °F (36.6 °C) 98.2 °F (36.8 °C)   TempSrc: Oral  Oral Oral   SpO2: 95%  97% 97%   Weight:  233 lb (105.7 kg)     Height:                                                  BP Readings from Last 3 Encounters:   06/05/23 (!) 165/80   04/27/23 130/60   10/31/22 138/88       NPO Status: Time of last liquid consumption: 0600 (Small sip with pain medication)                        Time of last solid consumption:  (2359)                        Date of last liquid consumption: 06/05/23                        Date of last solid food consumption: 06/04/23    BMI:   Wt Readings from Last 3 Encounters:   06/05/23 233 lb (105.7 kg)   04/27/23 252 lb (114.3 kg)   10/31/22 243 lb (110.2 kg)     Body mass index is 32.5 kg/m². CBC:   Lab Results   Component Value Date/Time    WBC 7.4 06/05/2023 07:10 AM    RBC 4.61 06/05/2023 07:10 AM    HGB 14.5 06/05/2023 07:10 AM    HCT 43.0 06/05/2023 07:10 AM    MCV 93.2 06/05/2023 07:10 AM    RDW 13.3 06/05/2023 07:10 AM     06/05/2023 07:10 AM       CMP:   Lab Results   Component Value Date/Time     06/05/2023 07:10 AM    K 4.6 06/05/2023 07:10 AM    K 4.8 05/30/2023 01:55 PM     06/05/2023 07:10 AM    CO2 30 06/05/2023 07:10 AM    BUN 12 06/05/2023 07:10 AM    CREATININE <0.5 06/05/2023 07:10 AM    GFRAA >60 08/22/2022 10:03 AM    GFRAA >60 10/24/2012 11:05 AM    AGRATIO 1.5 05/15/2023 09:32 AM    LABGLOM >60 06/05/2023 07:10 AM    GLUCOSE 87 06/05/2023 07:10 AM    PROT 6.8 05/15/2023 09:32 AM    PROT 7.2 10/24/2012 11:05 AM    CALCIUM 9.4 06/05/2023 07:10 AM    BILITOT 0.5 05/15/2023 09:32 AM    ALKPHOS 84 05/15/2023 09:32 AM    AST 21 05/15/2023 09:32 AM    ALT 16 05/15/2023 09:32 AM       POC Tests: No results for input(s): POCGLU, POCNA, POCK, POCCL, POCBUN, POCHEMO, POCHCT in the last 72 hours.     Coags:   Lab Results

## 2023-06-06 ENCOUNTER — ANESTHESIA EVENT (OUTPATIENT)
Dept: OPERATING ROOM | Age: 78
End: 2023-06-06
Payer: MEDICARE

## 2023-06-06 PROCEDURE — 2580000003 HC RX 258

## 2023-06-06 PROCEDURE — 6370000000 HC RX 637 (ALT 250 FOR IP)

## 2023-06-06 PROCEDURE — 6360000002 HC RX W HCPCS: Performed by: NURSE PRACTITIONER

## 2023-06-06 PROCEDURE — 6360000002 HC RX W HCPCS: Performed by: SURGERY

## 2023-06-06 PROCEDURE — 2060000000 HC ICU INTERMEDIATE R&B

## 2023-06-06 PROCEDURE — 99232 SBSQ HOSP IP/OBS MODERATE 35: CPT | Performed by: HOSPITALIST

## 2023-06-06 RX ADMIN — SODIUM CHLORIDE, PRESERVATIVE FREE 10 ML: 5 INJECTION INTRAVENOUS at 20:48

## 2023-06-06 RX ADMIN — LEVOTHYROXINE SODIUM 125 MCG: 125 TABLET ORAL at 07:01

## 2023-06-06 RX ADMIN — ENOXAPARIN SODIUM 100 MG: 100 INJECTION SUBCUTANEOUS at 08:26

## 2023-06-06 RX ADMIN — OXYCODONE AND ACETAMINOPHEN 1 TABLET: 325; 10 TABLET ORAL at 22:24

## 2023-06-06 RX ADMIN — PRAVASTATIN SODIUM 20 MG: 20 TABLET ORAL at 08:25

## 2023-06-06 RX ADMIN — ATENOLOL 50 MG: 50 TABLET ORAL at 08:25

## 2023-06-06 RX ADMIN — ENOXAPARIN SODIUM 100 MG: 100 INJECTION SUBCUTANEOUS at 20:45

## 2023-06-06 RX ADMIN — SODIUM CHLORIDE, PRESERVATIVE FREE 10 ML: 5 INJECTION INTRAVENOUS at 08:26

## 2023-06-06 ASSESSMENT — PAIN SCALES - GENERAL: PAINLEVEL_OUTOF10: 8

## 2023-06-06 NOTE — CONSULTS
Department of Orthopedic Surgery  Attending   Consult Note         Reason for Consult: Right elbow  Requesting Physician: Sarai Sanderson MD  Date of Service: 6/6/2023 4:15 PM    CHIEF COMPLAINT:  As Above    History Obtained From:  patient    HISTORY OF PRESENT ILLNESS:                The patient is a 68 y.o. male who presents with above chief complaint. He was admitted with a supravascular events and aneurysm. He appears to be scheduled for some type of carotid endarterectomy. He also scheduled for Monday but canceled. He is in hospital for 8 or 9 days. She is below the frustrated by this. When he was admitted to the hospital he had no swelling or significant pain in the elbow. During hospitalization he developed pain swelling loss of movement. It is deep-seated and diffuse. He has no neurovascular complaints. Pre-existing stiffness occasionally the elbow and pain.     He does not carry diagnosis of any type of inflammatory arthritis nor crystalline arthropathy    Past Medical History:        Diagnosis Date    Atrial fibrillation ([facility] [address]     Cancer ([facility] [address]     Basal cell carcinoma of forehead    Epididymitis     Grave's disease     Graves disease     Graves' eye disease 11/5/2020    Hyperlipidemia     Hypertension     Irregular heart rate     Polio     Post-polio muscle weakness 9/4/2012    [facility]evaluation at [facility] or [facility]     Swelling of joint, elbow, left 5/25/2017    Due to heme arthrosis w poor INR control 5/2017     Past Surgical History:        Procedure Laterality Date    ABDOMEN SURGERY      ANUS SURGERY  9-18-14    EVALUATION UNDER ANESTHESIA, FISTULA PLUG    ANUS SURGERY N/A 3/8/2021    EXAM UNDER ANESTHESIA, FLEXIBLE SIGMOIDOSCOPY, FISTULOTOMY performed by Ronny Méndez MD at [facility]      repeat x10 yrs    EYE SURGERY      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

## 2023-06-06 NOTE — ANESTHESIA PRE PROCEDURE
tablet 50 mg  50 mg Oral Daily Lu Horta MD   50 mg at 06/06/23 0825    levothyroxine (SYNTHROID) tablet 125 mcg  125 mcg Oral Daily Lu Horta MD   125 mcg at 06/06/23 0701    pravastatin (PRAVACHOL) tablet 20 mg  20 mg Oral Daily Lu Horta MD   20 mg at 06/06/23 0825    sodium chloride flush 0.9 % injection 5-40 mL  5-40 mL IntraVENous 2 times per day Lu Horta MD   10 mL at 06/06/23 0826    sodium chloride flush 0.9 % injection 5-40 mL  5-40 mL IntraVENous PRN Lu Horta MD        0.9 % sodium chloride infusion   IntraVENous PRN Lu Horta MD        ondansetron (ZOFRAN-ODT) disintegrating tablet 4 mg  4 mg Oral Q8H PRN Lu Horta MD        Or    [facility] injection 4 mg  4 mg IntraVENous Q6H PRN Lu Horta MD        polyethylene glycol [facility]) packet 17 g  17 g Oral Daily PRN Lu Horta MD        acetaminophen (TYLENOL) tablet 1,000 mg  1,000 mg Oral Q6H PRN Lu Horta MD   1,000 mg at 06/03/23 1325    Or    acetaminophen (TYLENOL) suppository 650 mg  650 mg Rectal Q6H PRN Lu Horta MD           Allergies:     Allergies   Allergen Reactions    Naproxen Hives and Other (See Comments)     Welts and nerve pain       Problem List:    Patient Active Problem List   Diagnosis Code    Thyroid disease E07.9    Kidney stones N20.0    Atrial fibrillation (HCC) I48.91    Hyperlipidemia E78.5    Testosterone deficiency E34.9    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

## 2023-06-06 NOTE — CARE COORDINATION
CM following: Pt was sleeping when CM attended room. Planning for OR tomorrow at noon - CM will review discharge needs with pt post-op. Pt is indp at baseline w/crutches. Pt is form home w/SO, single level home. Pt is an active  and his SO is able to transport him home. CM will continue to follow for discharge planning.   Electronically signed by ORTEGA Medina on 6/6/2023 at 3:07 PM  [phone]

## 2023-06-07 ENCOUNTER — APPOINTMENT (OUTPATIENT)
Dept: VASCULAR LAB | Age: 78
End: 2023-06-07
Payer: MEDICARE

## 2023-06-07 ENCOUNTER — ANESTHESIA (OUTPATIENT)
Dept: OPERATING ROOM | Age: 78
End: 2023-06-07
Payer: MEDICARE

## 2023-06-07 LAB
ALBUMIN SERPL-MCNC: 3.3 G/DL (ref 3.4–5)
ANION GAP SERPL CALCULATED.3IONS-SCNC: 10 MMOL/L (ref 3–16)
APTT BLD: 42.9 SEC (ref 22.7–35.9)
BASOPHILS # BLD: 0 K/UL (ref 0–0.2)
BASOPHILS # BLD: 0.1 K/UL (ref 0–0.2)
BASOPHILS NFR BLD: 0.3 %
BASOPHILS NFR BLD: 0.8 %
BUN SERPL-MCNC: 12 MG/DL (ref 7–20)
CALCIUM SERPL-MCNC: 8.9 MG/DL (ref 8.3–10.6)
CHLORIDE SERPL-SCNC: 101 MMOL/L (ref 99–110)
CO2 SERPL-SCNC: 25 MMOL/L (ref 21–32)
CREAT SERPL-MCNC: <0.5 MG/DL (ref 0.8–1.3)
DEPRECATED RDW RBC AUTO: 12.9 % (ref 12.4–15.4)
DEPRECATED RDW RBC AUTO: 13.3 % (ref 12.4–15.4)
EOSINOPHIL # BLD: 0 K/UL (ref 0–0.6)
EOSINOPHIL # BLD: 0.1 K/UL (ref 0–0.6)
EOSINOPHIL NFR BLD: 0 %
EOSINOPHIL NFR BLD: 1.6 %
GFR SERPLBLD CREATININE-BSD FMLA CKD-EPI: >60 ML/MIN/{1.73_M2}
GLUCOSE SERPL-MCNC: 92 MG/DL (ref 70–99)
HCT VFR BLD AUTO: 42.7 % (ref 40.5–52.5)
HCT VFR BLD AUTO: 43.3 % (ref 40.5–52.5)
HGB BLD-MCNC: 14.5 G/DL (ref 13.5–17.5)
HGB BLD-MCNC: 14.7 G/DL (ref 13.5–17.5)
LYMPHOCYTES # BLD: 0.4 K/UL (ref 1–5.1)
LYMPHOCYTES # BLD: 1.2 K/UL (ref 1–5.1)
LYMPHOCYTES NFR BLD: 17.5 %
LYMPHOCYTES NFR BLD: 3.8 %
MAGNESIUM SERPL-MCNC: 1.9 MG/DL (ref 1.8–2.4)
MCH RBC QN AUTO: 31.3 PG (ref 26–34)
MCH RBC QN AUTO: 31.7 PG (ref 26–34)
MCHC RBC AUTO-ENTMCNC: 33.9 G/DL (ref 31–36)
MCHC RBC AUTO-ENTMCNC: 34 G/DL (ref 31–36)
MCV RBC AUTO: 92.2 FL (ref 80–100)
MCV RBC AUTO: 93.4 FL (ref 80–100)
MONOCYTES # BLD: 0.4 K/UL (ref 0–1.3)
MONOCYTES # BLD: 0.9 K/UL (ref 0–1.3)
MONOCYTES NFR BLD: 12.6 %
MONOCYTES NFR BLD: 3.3 %
NEUTROPHILS # BLD: 10 K/UL (ref 1.7–7.7)
NEUTROPHILS # BLD: 4.7 K/UL (ref 1.7–7.7)
NEUTROPHILS NFR BLD: 67.5 %
NEUTROPHILS NFR BLD: 92.6 %
PHOSPHATE SERPL-MCNC: 2.7 MG/DL (ref 2.5–4.9)
PLATELET # BLD AUTO: 222 K/UL (ref 135–450)
PLATELET # BLD AUTO: 257 K/UL (ref 135–450)
PMV BLD AUTO: 8.8 FL (ref 5–10.5)
PMV BLD AUTO: 8.9 FL (ref 5–10.5)
POTASSIUM SERPL-SCNC: 3.9 MMOL/L (ref 3.5–5.1)
RBC # BLD AUTO: 4.63 M/UL (ref 4.2–5.9)
RBC # BLD AUTO: 4.63 M/UL (ref 4.2–5.9)
SODIUM SERPL-SCNC: 136 MMOL/L (ref 136–145)
WBC # BLD AUTO: 10.8 K/UL (ref 4–11)
WBC # BLD AUTO: 6.9 K/UL (ref 4–11)

## 2023-06-07 PROCEDURE — 2580000003 HC RX 258: Performed by: STUDENT IN AN ORGANIZED HEALTH CARE EDUCATION/TRAINING PROGRAM

## 2023-06-07 PROCEDURE — 6370000000 HC RX 637 (ALT 250 FOR IP)

## 2023-06-07 PROCEDURE — 2580000003 HC RX 258: Performed by: SURGERY

## 2023-06-07 PROCEDURE — 36415 COLL VENOUS BLD VENIPUNCTURE: CPT

## 2023-06-07 PROCEDURE — 6360000002 HC RX W HCPCS: Performed by: NURSE ANESTHETIST, CERTIFIED REGISTERED

## 2023-06-07 PROCEDURE — 3700000001 HC ADD 15 MINUTES (ANESTHESIA): Performed by: SURGERY

## 2023-06-07 PROCEDURE — 85025 COMPLETE CBC W/AUTO DIFF WBC: CPT

## 2023-06-07 PROCEDURE — 2580000003 HC RX 258

## 2023-06-07 PROCEDURE — 3700000000 HC ANESTHESIA ATTENDED CARE: Performed by: SURGERY

## 2023-06-07 PROCEDURE — 3600000014 HC SURGERY LEVEL 4 ADDTL 15MIN: Performed by: SURGERY

## 2023-06-07 PROCEDURE — 2709999900 HC NON-CHARGEABLE SUPPLY: Performed by: SURGERY

## 2023-06-07 PROCEDURE — A4217 STERILE WATER/SALINE, 500 ML: HCPCS | Performed by: SURGERY

## 2023-06-07 PROCEDURE — 6370000000 HC RX 637 (ALT 250 FOR IP): Performed by: SURGERY

## 2023-06-07 PROCEDURE — 6360000002 HC RX W HCPCS: Performed by: ANESTHESIOLOGY

## 2023-06-07 PROCEDURE — 6360000002 HC RX W HCPCS: Performed by: SURGERY

## 2023-06-07 PROCEDURE — 2500000003 HC RX 250 WO HCPCS: Performed by: ANESTHESIOLOGY

## 2023-06-07 PROCEDURE — 3600000004 HC SURGERY LEVEL 4 BASE: Performed by: SURGERY

## 2023-06-07 PROCEDURE — 85730 THROMBOPLASTIN TIME PARTIAL: CPT

## 2023-06-07 PROCEDURE — 2700000000 HC OXYGEN THERAPY PER DAY

## 2023-06-07 PROCEDURE — 2580000003 HC RX 258: Performed by: ANESTHESIOLOGY

## 2023-06-07 PROCEDURE — 2500000003 HC RX 250 WO HCPCS

## 2023-06-07 PROCEDURE — 2000000000 HC ICU R&B

## 2023-06-07 PROCEDURE — 2500000003 HC RX 250 WO HCPCS: Performed by: SURGERY

## 2023-06-07 PROCEDURE — 2500000003 HC RX 250 WO HCPCS: Performed by: NURSE ANESTHETIST, CERTIFIED REGISTERED

## 2023-06-07 PROCEDURE — 7100000001 HC PACU RECOVERY - ADDTL 15 MIN: Performed by: SURGERY

## 2023-06-07 PROCEDURE — 93882 EXTRACRANIAL UNI/LTD STUDY: CPT

## 2023-06-07 PROCEDURE — 2720000010 HC SURG SUPPLY STERILE: Performed by: SURGERY

## 2023-06-07 PROCEDURE — 80069 RENAL FUNCTION PANEL: CPT

## 2023-06-07 PROCEDURE — 6360000002 HC RX W HCPCS: Performed by: STUDENT IN AN ORGANIZED HEALTH CARE EDUCATION/TRAINING PROGRAM

## 2023-06-07 PROCEDURE — 83735 ASSAY OF MAGNESIUM: CPT

## 2023-06-07 PROCEDURE — 2580000003 HC RX 258: Performed by: NURSE ANESTHETIST, CERTIFIED REGISTERED

## 2023-06-07 PROCEDURE — 6370000000 HC RX 637 (ALT 250 FOR IP): Performed by: STUDENT IN AN ORGANIZED HEALTH CARE EDUCATION/TRAINING PROGRAM

## 2023-06-07 PROCEDURE — 7100000000 HC PACU RECOVERY - FIRST 15 MIN: Performed by: SURGERY

## 2023-06-07 PROCEDURE — 99232 SBSQ HOSP IP/OBS MODERATE 35: CPT | Performed by: HOSPITALIST

## 2023-06-07 PROCEDURE — C1889 IMPLANT/INSERT DEVICE, NOC: HCPCS | Performed by: SURGERY

## 2023-06-07 PROCEDURE — 35001 REPAIR DEFECT OF ARTERY: CPT | Performed by: SURGERY

## 2023-06-07 PROCEDURE — 94761 N-INVAS EAR/PLS OXIMETRY MLT: CPT

## 2023-06-07 PROCEDURE — 2780000010 HC IMPLANT OTHER: Performed by: SURGERY

## 2023-06-07 RX ORDER — HYDROMORPHONE HYDROCHLORIDE 1 MG/ML
0.5 INJECTION, SOLUTION INTRAMUSCULAR; INTRAVENOUS; SUBCUTANEOUS
Status: DISCONTINUED | OUTPATIENT
Start: 2023-06-07 | End: 2023-06-08 | Stop reason: HOSPADM

## 2023-06-07 RX ORDER — ATROPINE SULFATE 0.1 MG/ML
INJECTION INTRAVENOUS PRN
Status: DISCONTINUED | OUTPATIENT
Start: 2023-06-07 | End: 2023-06-07 | Stop reason: SDUPTHER

## 2023-06-07 RX ORDER — PROTAMINE SULFATE 10 MG/ML
INJECTION, SOLUTION INTRAVENOUS PRN
Status: DISCONTINUED | OUTPATIENT
Start: 2023-06-07 | End: 2023-06-07 | Stop reason: SDUPTHER

## 2023-06-07 RX ORDER — ENOXAPARIN SODIUM 100 MG/ML
30 INJECTION SUBCUTANEOUS 2 TIMES DAILY
Status: DISCONTINUED | OUTPATIENT
Start: 2023-06-07 | End: 2023-06-08 | Stop reason: ALTCHOICE

## 2023-06-07 RX ORDER — DEXAMETHASONE SODIUM PHOSPHATE 4 MG/ML
INJECTION, SOLUTION INTRA-ARTICULAR; INTRALESIONAL; INTRAMUSCULAR; INTRAVENOUS; SOFT TISSUE PRN
Status: DISCONTINUED | OUTPATIENT
Start: 2023-06-07 | End: 2023-06-07 | Stop reason: SDUPTHER

## 2023-06-07 RX ORDER — LIDOCAINE HYDROCHLORIDE 20 MG/ML
INJECTION, SOLUTION INFILTRATION; PERINEURAL PRN
Status: DISCONTINUED | OUTPATIENT
Start: 2023-06-07 | End: 2023-06-07 | Stop reason: SDUPTHER

## 2023-06-07 RX ORDER — SODIUM CHLORIDE 0.9 % (FLUSH) 0.9 %
5-40 SYRINGE (ML) INJECTION EVERY 12 HOURS SCHEDULED
Status: DISCONTINUED | OUTPATIENT
Start: 2023-06-07 | End: 2023-06-08 | Stop reason: HOSPADM

## 2023-06-07 RX ORDER — OXYCODONE HYDROCHLORIDE 5 MG/1
5 TABLET ORAL EVERY 4 HOURS PRN
Status: DISCONTINUED | OUTPATIENT
Start: 2023-06-07 | End: 2023-06-08 | Stop reason: HOSPADM

## 2023-06-07 RX ORDER — SODIUM CHLORIDE 0.9 % (FLUSH) 0.9 %
5-40 SYRINGE (ML) INJECTION PRN
Status: DISCONTINUED | OUTPATIENT
Start: 2023-06-07 | End: 2023-06-08 | Stop reason: HOSPADM

## 2023-06-07 RX ORDER — LABETALOL HYDROCHLORIDE 5 MG/ML
10 INJECTION, SOLUTION INTRAVENOUS ONCE
Status: COMPLETED | OUTPATIENT
Start: 2023-06-07 | End: 2023-06-07

## 2023-06-07 RX ORDER — FAMOTIDINE 10 MG/ML
INJECTION, SOLUTION INTRAVENOUS PRN
Status: DISCONTINUED | OUTPATIENT
Start: 2023-06-07 | End: 2023-06-07 | Stop reason: SDUPTHER

## 2023-06-07 RX ORDER — ONDANSETRON 2 MG/ML
4 INJECTION INTRAMUSCULAR; INTRAVENOUS
Status: DISCONTINUED | OUTPATIENT
Start: 2023-06-07 | End: 2023-06-07 | Stop reason: HOSPADM

## 2023-06-07 RX ORDER — HYDRALAZINE HYDROCHLORIDE 20 MG/ML
10 INJECTION INTRAMUSCULAR; INTRAVENOUS
Status: DISCONTINUED | OUTPATIENT
Start: 2023-06-07 | End: 2023-06-07 | Stop reason: HOSPADM

## 2023-06-07 RX ORDER — SODIUM CHLORIDE, SODIUM LACTATE, POTASSIUM CHLORIDE, CALCIUM CHLORIDE 600; 310; 30; 20 MG/100ML; MG/100ML; MG/100ML; MG/100ML
INJECTION, SOLUTION INTRAVENOUS CONTINUOUS
Status: DISCONTINUED | OUTPATIENT
Start: 2023-06-07 | End: 2023-06-08

## 2023-06-07 RX ORDER — CEFAZOLIN SODIUM 1 G/3ML
INJECTION, POWDER, FOR SOLUTION INTRAMUSCULAR; INTRAVENOUS PRN
Status: DISCONTINUED | OUTPATIENT
Start: 2023-06-07 | End: 2023-06-07 | Stop reason: SDUPTHER

## 2023-06-07 RX ORDER — ASPIRIN 81 MG/1
81 TABLET ORAL DAILY
Status: DISCONTINUED | OUTPATIENT
Start: 2023-06-07 | End: 2023-06-07

## 2023-06-07 RX ORDER — FENTANYL CITRATE 50 UG/ML
INJECTION, SOLUTION INTRAMUSCULAR; INTRAVENOUS PRN
Status: DISCONTINUED | OUTPATIENT
Start: 2023-06-07 | End: 2023-06-07 | Stop reason: SDUPTHER

## 2023-06-07 RX ORDER — NITROGLYCERIN 20 MG/100ML
5-200 INJECTION INTRAVENOUS CONTINUOUS
Status: DISCONTINUED | OUTPATIENT
Start: 2023-06-07 | End: 2023-06-07

## 2023-06-07 RX ORDER — HYDROMORPHONE HYDROCHLORIDE 1 MG/ML
0.5 INJECTION, SOLUTION INTRAMUSCULAR; INTRAVENOUS; SUBCUTANEOUS EVERY 5 MIN PRN
Status: COMPLETED | OUTPATIENT
Start: 2023-06-07 | End: 2023-06-07

## 2023-06-07 RX ORDER — SODIUM CHLORIDE, SODIUM LACTATE, POTASSIUM CHLORIDE, CALCIUM CHLORIDE 600; 310; 30; 20 MG/100ML; MG/100ML; MG/100ML; MG/100ML
INJECTION, SOLUTION INTRAVENOUS CONTINUOUS PRN
Status: DISCONTINUED | OUTPATIENT
Start: 2023-06-07 | End: 2023-06-07 | Stop reason: SDUPTHER

## 2023-06-07 RX ORDER — LIDOCAINE HYDROCHLORIDE 10 MG/ML
INJECTION, SOLUTION EPIDURAL; INFILTRATION; INTRACAUDAL; PERINEURAL PRN
Status: DISCONTINUED | OUTPATIENT
Start: 2023-06-07 | End: 2023-06-07 | Stop reason: ALTCHOICE

## 2023-06-07 RX ORDER — ROCURONIUM BROMIDE 10 MG/ML
INJECTION, SOLUTION INTRAVENOUS PRN
Status: DISCONTINUED | OUTPATIENT
Start: 2023-06-07 | End: 2023-06-07 | Stop reason: SDUPTHER

## 2023-06-07 RX ORDER — GLYCOPYRROLATE 1 MG/5 ML
SYRINGE (ML) INTRAVENOUS PRN
Status: DISCONTINUED | OUTPATIENT
Start: 2023-06-07 | End: 2023-06-07 | Stop reason: SDUPTHER

## 2023-06-07 RX ORDER — PROCHLORPERAZINE EDISYLATE 5 MG/ML
5 INJECTION INTRAMUSCULAR; INTRAVENOUS
Status: DISCONTINUED | OUTPATIENT
Start: 2023-06-07 | End: 2023-06-07 | Stop reason: HOSPADM

## 2023-06-07 RX ORDER — LABETALOL HYDROCHLORIDE 5 MG/ML
10 INJECTION, SOLUTION INTRAVENOUS
Status: DISCONTINUED | OUTPATIENT
Start: 2023-06-07 | End: 2023-06-07 | Stop reason: HOSPADM

## 2023-06-07 RX ORDER — DOPAMINE HYDROCHLORIDE 160 MG/100ML
1-20 INJECTION, SOLUTION INTRAVENOUS CONTINUOUS PRN
Status: DISCONTINUED | OUTPATIENT
Start: 2023-06-07 | End: 2023-06-08 | Stop reason: HOSPADM

## 2023-06-07 RX ORDER — OXYCODONE HYDROCHLORIDE 5 MG/1
5 TABLET ORAL
Status: DISCONTINUED | OUTPATIENT
Start: 2023-06-07 | End: 2023-06-07 | Stop reason: HOSPADM

## 2023-06-07 RX ORDER — NITROGLYCERIN 20 MG/100ML
5-200 INJECTION INTRAVENOUS CONTINUOUS PRN
Status: DISCONTINUED | OUTPATIENT
Start: 2023-06-07 | End: 2023-06-08 | Stop reason: HOSPADM

## 2023-06-07 RX ORDER — HYDRALAZINE HYDROCHLORIDE 20 MG/ML
INJECTION INTRAMUSCULAR; INTRAVENOUS PRN
Status: DISCONTINUED | OUTPATIENT
Start: 2023-06-07 | End: 2023-06-07 | Stop reason: SDUPTHER

## 2023-06-07 RX ORDER — SODIUM CHLORIDE 0.9 % (FLUSH) 0.9 %
5-40 SYRINGE (ML) INJECTION EVERY 12 HOURS SCHEDULED
Status: DISCONTINUED | OUTPATIENT
Start: 2023-06-07 | End: 2023-06-07 | Stop reason: HOSPADM

## 2023-06-07 RX ORDER — SODIUM CHLORIDE 9 MG/ML
INJECTION, SOLUTION INTRAVENOUS PRN
Status: DISCONTINUED | OUTPATIENT
Start: 2023-06-07 | End: 2023-06-07 | Stop reason: HOSPADM

## 2023-06-07 RX ORDER — HYDROMORPHONE HYDROCHLORIDE 1 MG/ML
0.25 INJECTION, SOLUTION INTRAMUSCULAR; INTRAVENOUS; SUBCUTANEOUS
Status: DISCONTINUED | OUTPATIENT
Start: 2023-06-07 | End: 2023-06-08 | Stop reason: HOSPADM

## 2023-06-07 RX ORDER — PHENYLEPHRINE HYDROCHLORIDE 10 MG/ML
INJECTION INTRAVENOUS PRN
Status: DISCONTINUED | OUTPATIENT
Start: 2023-06-07 | End: 2023-06-07 | Stop reason: SDUPTHER

## 2023-06-07 RX ORDER — LORAZEPAM 2 MG/ML
0.5 INJECTION INTRAMUSCULAR EVERY 10 MIN PRN
Status: DISCONTINUED | OUTPATIENT
Start: 2023-06-07 | End: 2023-06-08 | Stop reason: HOSPADM

## 2023-06-07 RX ORDER — SODIUM CHLORIDE 9 MG/ML
INJECTION, SOLUTION INTRAVENOUS PRN
Status: DISCONTINUED | OUTPATIENT
Start: 2023-06-07 | End: 2023-06-08 | Stop reason: HOSPADM

## 2023-06-07 RX ORDER — OXYCODONE HYDROCHLORIDE 5 MG/1
10 TABLET ORAL EVERY 4 HOURS PRN
Status: DISCONTINUED | OUTPATIENT
Start: 2023-06-07 | End: 2023-06-08 | Stop reason: HOSPADM

## 2023-06-07 RX ORDER — DOPAMINE HYDROCHLORIDE 160 MG/100ML
1-20 INJECTION, SOLUTION INTRAVENOUS CONTINUOUS
Status: DISCONTINUED | OUTPATIENT
Start: 2023-06-07 | End: 2023-06-07

## 2023-06-07 RX ORDER — HEPARIN SODIUM AND DEXTROSE 10000; 5 [USP'U]/100ML; G/100ML
750 INJECTION INTRAVENOUS CONTINUOUS
Status: DISCONTINUED | OUTPATIENT
Start: 2023-06-07 | End: 2023-06-08 | Stop reason: ALTCHOICE

## 2023-06-07 RX ORDER — ONDANSETRON 2 MG/ML
INJECTION INTRAMUSCULAR; INTRAVENOUS PRN
Status: DISCONTINUED | OUTPATIENT
Start: 2023-06-07 | End: 2023-06-07 | Stop reason: SDUPTHER

## 2023-06-07 RX ORDER — SODIUM CHLORIDE 0.9 % (FLUSH) 0.9 %
5-40 SYRINGE (ML) INJECTION PRN
Status: DISCONTINUED | OUTPATIENT
Start: 2023-06-07 | End: 2023-06-07 | Stop reason: HOSPADM

## 2023-06-07 RX ORDER — MEPERIDINE HYDROCHLORIDE 25 MG/ML
12.5 INJECTION INTRAMUSCULAR; INTRAVENOUS; SUBCUTANEOUS EVERY 5 MIN PRN
Status: DISCONTINUED | OUTPATIENT
Start: 2023-06-07 | End: 2023-06-07 | Stop reason: HOSPADM

## 2023-06-07 RX ORDER — HEPARIN SODIUM 1000 [USP'U]/ML
INJECTION, SOLUTION INTRAVENOUS; SUBCUTANEOUS PRN
Status: DISCONTINUED | OUTPATIENT
Start: 2023-06-07 | End: 2023-06-07 | Stop reason: SDUPTHER

## 2023-06-07 RX ORDER — PROPOFOL 10 MG/ML
INJECTION, EMULSION INTRAVENOUS PRN
Status: DISCONTINUED | OUTPATIENT
Start: 2023-06-07 | End: 2023-06-07 | Stop reason: SDUPTHER

## 2023-06-07 RX ADMIN — ATROPINE SULFATE 0.2 MG: 0.1 INJECTION, SOLUTION INTRAVENOUS at 13:14

## 2023-06-07 RX ADMIN — OXYCODONE HYDROCHLORIDE 10 MG: 5 TABLET ORAL at 23:23

## 2023-06-07 RX ADMIN — ROCURONIUM BROMIDE 50 MG: 10 INJECTION INTRAVENOUS at 13:17

## 2023-06-07 RX ADMIN — HYDROMORPHONE HYDROCHLORIDE 0.5 MG: 1 INJECTION, SOLUTION INTRAMUSCULAR; INTRAVENOUS; SUBCUTANEOUS at 16:36

## 2023-06-07 RX ADMIN — DEXAMETHASONE SODIUM PHOSPHATE 8 MG: 4 INJECTION, SOLUTION INTRAMUSCULAR; INTRAVENOUS at 12:32

## 2023-06-07 RX ADMIN — LABETALOL HYDROCHLORIDE 10 MG: 5 INJECTION, SOLUTION INTRAVENOUS at 23:23

## 2023-06-07 RX ADMIN — PRAVASTATIN SODIUM 20 MG: 20 TABLET ORAL at 10:02

## 2023-06-07 RX ADMIN — SODIUM CHLORIDE, SODIUM LACTATE, POTASSIUM CHLORIDE, AND CALCIUM CHLORIDE: .6; .31; .03; .02 INJECTION, SOLUTION INTRAVENOUS at 12:00

## 2023-06-07 RX ADMIN — LEVOTHYROXINE SODIUM 125 MCG: 125 TABLET ORAL at 05:55

## 2023-06-07 RX ADMIN — LIDOCAINE HYDROCHLORIDE 100 MG: 20 INJECTION, SOLUTION INFILTRATION; PERINEURAL at 12:10

## 2023-06-07 RX ADMIN — SODIUM CHLORIDE, POTASSIUM CHLORIDE, SODIUM LACTATE AND CALCIUM CHLORIDE: 600; 310; 30; 20 INJECTION, SOLUTION INTRAVENOUS at 16:40

## 2023-06-07 RX ADMIN — SODIUM CHLORIDE, SODIUM LACTATE, POTASSIUM CHLORIDE, CALCIUM CHLORIDE: 600; 310; 30; 20 INJECTION, SOLUTION INTRAVENOUS at 12:30

## 2023-06-07 RX ADMIN — FAMOTIDINE 20 MG: 10 INJECTION, SOLUTION INTRAVENOUS at 12:06

## 2023-06-07 RX ADMIN — HYDRALAZINE HYDROCHLORIDE 8 MG: 20 INJECTION INTRAMUSCULAR; INTRAVENOUS at 15:49

## 2023-06-07 RX ADMIN — FENTANYL CITRATE 100 MCG: 50 INJECTION, SOLUTION INTRAMUSCULAR; INTRAVENOUS at 12:55

## 2023-06-07 RX ADMIN — HEPARIN SODIUM 5000 UNITS: 1000 INJECTION INTRAVENOUS; SUBCUTANEOUS at 13:38

## 2023-06-07 RX ADMIN — SODIUM CHLORIDE, PRESERVATIVE FREE 10 ML: 5 INJECTION INTRAVENOUS at 10:03

## 2023-06-07 RX ADMIN — ATENOLOL 50 MG: 50 TABLET ORAL at 10:02

## 2023-06-07 RX ADMIN — HYDROMORPHONE HYDROCHLORIDE 0.5 MG: 1 INJECTION, SOLUTION INTRAMUSCULAR; INTRAVENOUS; SUBCUTANEOUS at 16:30

## 2023-06-07 RX ADMIN — PROTAMINE SULFATE 20 MG: 10 INJECTION, SOLUTION INTRAVENOUS at 15:19

## 2023-06-07 RX ADMIN — FENTANYL CITRATE 100 MCG: 50 INJECTION, SOLUTION INTRAMUSCULAR; INTRAVENOUS at 12:51

## 2023-06-07 RX ADMIN — HEPARIN SODIUM 2000 UNITS: 1000 INJECTION INTRAVENOUS; SUBCUTANEOUS at 14:35

## 2023-06-07 RX ADMIN — SODIUM CHLORIDE, SODIUM LACTATE, POTASSIUM CHLORIDE, CALCIUM CHLORIDE: 600; 310; 30; 20 INJECTION, SOLUTION INTRAVENOUS at 15:56

## 2023-06-07 RX ADMIN — PHENYLEPHRINE HYDROCHLORIDE 100 MCG: 10 INJECTION INTRAVENOUS at 13:14

## 2023-06-07 RX ADMIN — LORAZEPAM 0.5 MG: 2 INJECTION INTRAMUSCULAR; INTRAVENOUS at 17:25

## 2023-06-07 RX ADMIN — ROCURONIUM BROMIDE 20 MG: 10 INJECTION INTRAVENOUS at 15:00

## 2023-06-07 RX ADMIN — ONDANSETRON 8 MG: 2 INJECTION INTRAMUSCULAR; INTRAVENOUS at 12:06

## 2023-06-07 RX ADMIN — ROCURONIUM BROMIDE 30 MG: 10 INJECTION INTRAVENOUS at 14:36

## 2023-06-07 RX ADMIN — FENTANYL CITRATE 100 MCG: 50 INJECTION, SOLUTION INTRAMUSCULAR; INTRAVENOUS at 15:28

## 2023-06-07 RX ADMIN — HEPARIN SODIUM 750 UNITS/HR: 10000 INJECTION, SOLUTION INTRAVENOUS at 21:11

## 2023-06-07 RX ADMIN — Medication 0.4 MG: at 13:10

## 2023-06-07 RX ADMIN — PROPOFOL 150 MG: 10 INJECTION, EMULSION INTRAVENOUS at 12:10

## 2023-06-07 RX ADMIN — METHOCARBAMOL 1000 MG: 100 INJECTION INTRAMUSCULAR; INTRAVENOUS at 18:16

## 2023-06-07 RX ADMIN — SODIUM CHLORIDE, PRESERVATIVE FREE 10 ML: 5 INJECTION INTRAVENOUS at 20:09

## 2023-06-07 RX ADMIN — HYDROMORPHONE HYDROCHLORIDE 0.5 MG: 1 INJECTION, SOLUTION INTRAMUSCULAR; INTRAVENOUS; SUBCUTANEOUS at 17:05

## 2023-06-07 RX ADMIN — CEFAZOLIN SODIUM 3 G: 1 POWDER, FOR SOLUTION INTRAMUSCULAR; INTRAVENOUS at 12:25

## 2023-06-07 RX ADMIN — HYDROMORPHONE HYDROCHLORIDE 0.5 MG: 1 INJECTION, SOLUTION INTRAMUSCULAR; INTRAVENOUS; SUBCUTANEOUS at 19:10

## 2023-06-07 RX ADMIN — HYDROMORPHONE HYDROCHLORIDE 0.5 MG: 1 INJECTION, SOLUTION INTRAMUSCULAR; INTRAVENOUS; SUBCUTANEOUS at 23:23

## 2023-06-07 RX ADMIN — HYDROMORPHONE HYDROCHLORIDE 0.5 MG: 1 INJECTION, SOLUTION INTRAMUSCULAR; INTRAVENOUS; SUBCUTANEOUS at 17:19

## 2023-06-07 RX ADMIN — SUGAMMADEX 300 MG: 100 INJECTION, SOLUTION INTRAVENOUS at 15:53

## 2023-06-07 RX ADMIN — ROCURONIUM BROMIDE 100 MG: 10 INJECTION INTRAVENOUS at 12:11

## 2023-06-07 RX ADMIN — PROPOFOL 50 MG: 10 INJECTION, EMULSION INTRAVENOUS at 15:40

## 2023-06-07 RX ADMIN — HEPARIN SODIUM 2000 UNITS: 1000 INJECTION INTRAVENOUS; SUBCUTANEOUS at 13:59

## 2023-06-07 ASSESSMENT — PAIN DESCRIPTION - DESCRIPTORS
DESCRIPTORS: DISCOMFORT

## 2023-06-07 ASSESSMENT — PAIN DESCRIPTION - FREQUENCY
FREQUENCY: CONTINUOUS

## 2023-06-07 ASSESSMENT — PAIN DESCRIPTION - ONSET
ONSET: ON-GOING

## 2023-06-07 ASSESSMENT — PAIN SCALES - GENERAL
PAINLEVEL_OUTOF10: 7
PAINLEVEL_OUTOF10: 4
PAINLEVEL_OUTOF10: 10
PAINLEVEL_OUTOF10: 3
PAINLEVEL_OUTOF10: 10
PAINLEVEL_OUTOF10: 10
PAINLEVEL_OUTOF10: 8
PAINLEVEL_OUTOF10: 10
PAINLEVEL_OUTOF10: 4
PAINLEVEL_OUTOF10: 4

## 2023-06-07 ASSESSMENT — PAIN DESCRIPTION - ORIENTATION
ORIENTATION: RIGHT

## 2023-06-07 ASSESSMENT — PAIN DESCRIPTION - PAIN TYPE
TYPE: ACUTE PAIN

## 2023-06-07 ASSESSMENT — PAIN DESCRIPTION - LOCATION
LOCATION: ELBOW

## 2023-06-07 NOTE — ANESTHESIA POSTPROCEDURE EVALUATION
Department of Anesthesiology  Postprocedure Note    Patient: Reji May  MRN: [id number]  YOB: 1945  Date of evaluation: 6/7/2023      Procedure Summary     Date: 06/07/23 Room / Location: [address] [place] [address]    Anesthesia Start: 1200 Anesthesia Stop: 7023    Procedure: LEFT CAROTID ARTERY ANEURYSM REPAIR (Left) Diagnosis:       Aneurysm of left carotid artery (HCC)      (Aneurysm of left carotid artery ([address] [I72.0])    Surgeons: Aracelis Zhang MD Responsible Provider: Agnes Ferrari MD    Anesthesia Type: general ASA Status: 3          Anesthesia Type: No value filed.     Joslyn Phase I: Joslyn Score: 8    Joslyn Phase II:        Anesthesia Post Evaluation    Patient location during evaluation: PACU  Patient participation: complete - patient participated  Level of consciousness: awake and alert  Airway patency: patent  Nausea & Vomiting: no nausea and no vomiting  Complications: no  Cardiovascular status: hemodynamically stable  Respiratory status: acceptable  Hydration status: euvolemic  Multimodal analgesia pain management approach

## 2023-06-07 NOTE — BRIEF OP NOTE
Brief Postoperative Note      Patient: Brandy Baca  YOB: 1945  MRN: [id number]    Date of Procedure: 6/7/2023    Pre-Op Diagnosis Codes:     * Aneurysm of left carotid artery ([address] [I72.0]    Post-Op Diagnosis: Same       Procedure(s):  LEFT CAROTID ARTERY ANEURYSM REPAIR    Surgeon(s):  Tiffanie Mckeon MD    Assistant:  Resident: Belinda Figueroa DO; Ned Andre MD    Anesthesia: General    Estimated Blood Loss (mL): Minimal    Complications: None    Specimens:   * No specimens in log *    Implants:  * No implants in log *      Drains:   Urinary Catheter 06/07/23 Lazo (Active)       Findings: Excision of aneurysm of proximal right internal carotid artery with transposition of normal mid segment onto proximal external carotid artery.        Electronically signed by Belinda Figueroa DO on 6/7/2023 at 3:51 PM

## 2023-06-07 NOTE — OP NOTE
tension, though there was not a lot of tension. At this point, the suture line was taken down. The stump of the internal carotid artery was debrided back to healthier tissue distally. The arteriotomy site of the carotid bulb was oversewn with 4-0 Prolene suture. The external carotid artery, which had been previously evaluated and was quite healthy appearing, was then transected approximately 3 cm distal to its origin. It was spatulated and then transposed and anastomosed end to end to the internal carotid artery, leaving inline flow from the common carotid artery through the proximal external carotid artery and then immediately through the internal carotid artery outflow. The distal stump of the external carotid artery was then oversewn with 4 Prolene suture. Hemostasis was achieved. Duplex imaging in the operating room showed widely patent outflow through the internal carotid artery with normal velocities. All bleeding points were controlled. The wound was irrigated with AntiBac saline solution. The platysma was closed with running 3-0 Vicryl suture followed closure of the skin with 4 Monocryl and Prineo. He was awakened in the operating room, moving all extremities, tongue was midline he is following commands. He was taken to recovery in stable condition. Needle, sponge, and counts were correct. Due to the presence of the aneurysm and poor tissue quality, the anastomosis had to be taken down and be done with transposition of the external carotid artery. Due to the tearing of the arterial tissues, further bleeding was approximately 150 cc and approximately 1 hour added to the case with additional complexity.     Electronically signed by Enma Walters MD on 6/7/2023 at 4:25 PM

## 2023-06-07 NOTE — ANESTHESIA PROCEDURE NOTES
Arterial Line:    An arterial line was placed using ultrasound guidance, in the OR for the following indication(s): continuous blood pressure monitoring, blood sampling needed and unable to use non-invasive cuff. A 20 gauge (size), 1 and 3/4 inch (length) (type) catheter was placed, Seldinger technique used, into the left radial artery, secured by tape and Tegaderm. Anesthesia type: General    Events:  patient tolerated procedure well with no complications and EBL < 5mL. 6/7/2023 12:29 PM6/7/2023 12:30 PM  Performed: Resident/CRNA   Preanesthetic Checklist  Completed: patient identified, IV checked, site marked, risks and benefits discussed, surgical/procedural consents, equipment checked, pre-op evaluation, timeout performed, anesthesia consent given, oxygen available, monitors applied/VS acknowledged, fire risk safety assessment completed and verbalized and blood product R/B/A discussed and consented

## 2023-06-07 NOTE — CARE COORDINATION
Patient is from home with s/o, independent pta. Plans are for OR today for Repair of L internal carotid aneurysm. Will need PT/OT post-op for any further recommendations. CM will continue to follow.     Parker Wing RN, BSN,    Ortho/Neuro   [phone]

## 2023-06-08 VITALS
HEART RATE: 105 BPM | DIASTOLIC BLOOD PRESSURE: 73 MMHG | HEIGHT: 71 IN | SYSTOLIC BLOOD PRESSURE: 131 MMHG | WEIGHT: 242.73 LBS | OXYGEN SATURATION: 94 % | BODY MASS INDEX: 33.98 KG/M2 | RESPIRATION RATE: 21 BRPM | TEMPERATURE: 98.4 F

## 2023-06-08 LAB
ALBUMIN SERPL-MCNC: 3.2 G/DL (ref 3.4–5)
ANION GAP SERPL CALCULATED.3IONS-SCNC: 10 MMOL/L (ref 3–16)
BUN SERPL-MCNC: 11 MG/DL (ref 7–20)
CALCIUM SERPL-MCNC: 8.6 MG/DL (ref 8.3–10.6)
CHLORIDE SERPL-SCNC: 96 MMOL/L (ref 99–110)
CO2 SERPL-SCNC: 24 MMOL/L (ref 21–32)
CREAT SERPL-MCNC: <0.5 MG/DL (ref 0.8–1.3)
DEPRECATED RDW RBC AUTO: 13.1 % (ref 12.4–15.4)
GFR SERPLBLD CREATININE-BSD FMLA CKD-EPI: >60 ML/MIN/{1.73_M2}
GLUCOSE SERPL-MCNC: 158 MG/DL (ref 70–99)
HCT VFR BLD AUTO: 39.2 % (ref 40.5–52.5)
HGB BLD-MCNC: 13.3 G/DL (ref 13.5–17.5)
MAGNESIUM SERPL-MCNC: 1.7 MG/DL (ref 1.8–2.4)
MCH RBC QN AUTO: 31.3 PG (ref 26–34)
MCHC RBC AUTO-ENTMCNC: 33.9 G/DL (ref 31–36)
MCV RBC AUTO: 92.3 FL (ref 80–100)
PHOSPHATE SERPL-MCNC: 3.5 MG/DL (ref 2.5–4.9)
PLATELET # BLD AUTO: 275 K/UL (ref 135–450)
PMV BLD AUTO: 9.2 FL (ref 5–10.5)
POTASSIUM SERPL-SCNC: 4.3 MMOL/L (ref 3.5–5.1)
RBC # BLD AUTO: 4.24 M/UL (ref 4.2–5.9)
SODIUM SERPL-SCNC: 130 MMOL/L (ref 136–145)
WBC # BLD AUTO: 10.2 K/UL (ref 4–11)

## 2023-06-08 PROCEDURE — 6360000002 HC RX W HCPCS

## 2023-06-08 PROCEDURE — 94761 N-INVAS EAR/PLS OXIMETRY MLT: CPT

## 2023-06-08 PROCEDURE — 2580000003 HC RX 258: Performed by: STUDENT IN AN ORGANIZED HEALTH CARE EDUCATION/TRAINING PROGRAM

## 2023-06-08 PROCEDURE — 83735 ASSAY OF MAGNESIUM: CPT

## 2023-06-08 PROCEDURE — 2500000003 HC RX 250 WO HCPCS

## 2023-06-08 PROCEDURE — 6370000000 HC RX 637 (ALT 250 FOR IP): Performed by: STUDENT IN AN ORGANIZED HEALTH CARE EDUCATION/TRAINING PROGRAM

## 2023-06-08 PROCEDURE — 80069 RENAL FUNCTION PANEL: CPT

## 2023-06-08 PROCEDURE — 6360000002 HC RX W HCPCS: Performed by: STUDENT IN AN ORGANIZED HEALTH CARE EDUCATION/TRAINING PROGRAM

## 2023-06-08 PROCEDURE — 85027 COMPLETE CBC AUTOMATED: CPT

## 2023-06-08 RX ORDER — DEXTROSE, SODIUM CHLORIDE, AND POTASSIUM CHLORIDE 5; .9; .15 G/100ML; G/100ML; G/100ML
INJECTION INTRAVENOUS CONTINUOUS
Status: DISCONTINUED | OUTPATIENT
Start: 2023-06-08 | End: 2023-06-08 | Stop reason: SDUPTHER

## 2023-06-08 RX ORDER — ASPIRIN 81 MG/1
81 TABLET, CHEWABLE ORAL DAILY
Qty: 30 TABLET | Refills: 5 | Status: SHIPPED | OUTPATIENT
Start: 2023-06-08 | End: 2023-12-05

## 2023-06-08 RX ORDER — WARFARIN SODIUM 5 MG/1
5 TABLET ORAL
Status: DISCONTINUED | OUTPATIENT
Start: 2023-06-08 | End: 2023-06-08 | Stop reason: HOSPADM

## 2023-06-08 RX ORDER — POLYETHYLENE GLYCOL 3350 17 G/17G
17 POWDER, FOR SOLUTION ORAL 2 TIMES DAILY
Status: DISCONTINUED | OUTPATIENT
Start: 2023-06-08 | End: 2023-06-08 | Stop reason: HOSPADM

## 2023-06-08 RX ORDER — ENOXAPARIN SODIUM 100 MG/ML
1 INJECTION SUBCUTANEOUS 2 TIMES DAILY
Qty: 12 ML | Refills: 0 | Status: SHIPPED | OUTPATIENT
Start: 2023-06-08 | End: 2023-06-13

## 2023-06-08 RX ORDER — ENOXAPARIN SODIUM 100 MG/ML
1 INJECTION SUBCUTANEOUS ONCE
Status: COMPLETED | OUTPATIENT
Start: 2023-06-08 | End: 2023-06-08

## 2023-06-08 RX ORDER — MAGNESIUM SULFATE IN WATER 40 MG/ML
2000 INJECTION, SOLUTION INTRAVENOUS ONCE
Status: COMPLETED | OUTPATIENT
Start: 2023-06-08 | End: 2023-06-08

## 2023-06-08 RX ADMIN — POTASSIUM CHLORIDE: 2 INJECTION, SOLUTION, CONCENTRATE INTRAVENOUS at 05:58

## 2023-06-08 RX ADMIN — ATENOLOL 50 MG: 50 TABLET ORAL at 08:10

## 2023-06-08 RX ADMIN — NITROGLYCERIN 100 MCG/MIN: 20 INJECTION INTRAVENOUS at 07:59

## 2023-06-08 RX ADMIN — LEVOTHYROXINE SODIUM 125 MCG: 125 TABLET ORAL at 05:57

## 2023-06-08 RX ADMIN — MAGNESIUM SULFATE HEPTAHYDRATE 2000 MG: 40 INJECTION, SOLUTION INTRAVENOUS at 08:06

## 2023-06-08 RX ADMIN — SODIUM CHLORIDE, POTASSIUM CHLORIDE, SODIUM LACTATE AND CALCIUM CHLORIDE: 600; 310; 30; 20 INJECTION, SOLUTION INTRAVENOUS at 03:33

## 2023-06-08 RX ADMIN — PRAVASTATIN SODIUM 20 MG: 20 TABLET ORAL at 08:10

## 2023-06-08 RX ADMIN — ENOXAPARIN SODIUM 100 MG: 100 INJECTION SUBCUTANEOUS at 16:56

## 2023-06-08 RX ADMIN — POLYETHYLENE GLYCOL 3350 17 G: 17 POWDER, FOR SOLUTION ORAL at 08:10

## 2023-06-08 RX ADMIN — NITROGLYCERIN 5 MCG/MIN: 20 INJECTION INTRAVENOUS at 00:26

## 2023-06-08 ASSESSMENT — PAIN SCALES - GENERAL: PAINLEVEL_OUTOF10: 3

## 2023-06-08 NOTE — CONSULTS
Clinical Pharmacy Progress Note    Warfarin - Management by Pharmacy    Consult Date(s): 6/8  Consulting Provider(s): Dr Jerry Vázquez / Plan  Afib - Warfarin  Goal INR: 1.8-2.2 - as per outpatient goal per Cardiology  Concurrent Anticoagulants / Antiplatelets: Heparin gtt at fixed rate  Interactions: No significant interactions noted. Current Regimen / Plan:   INR last checked 6/5 = 1.05. No warfarin received since 5/30, thus expect INR to still be ~1 today. Will begin with 5mg PO x1 for tonight. For now, will enter placeholder and dose daily; may consider resuming home regimen of 5mg daily as per patient's [address] [facility] home regimen. Recommend to continue bridge with heparin gtt. PS sent to Surgery to discuss monitoring of this. Will monitor pt's clinical status and INR daily, and make dose adjustments as needed. Thank you for consulting pharmacy,    Rey KingD., BCPS   6/8/2023 1:53 PM  Wireless: [phone]      Subjective/Objective:   Leeanne Faust is a 66 y.o. male with a PMHx significant for Afib on warfarin, HTN, HLP, Grave's dx, polio during childhood, basal cell carcinoma of forehead, who is admitted with L ICA aneurysm with mural thrombus s/p repair with Dr Jono Wylie on 6/7. Patient placed on heparin gtt at fixed rate post-op, and now warfarin to resume 6/8 PM.     Pharmacy is consulted to dose warfarin. Ht Readings from Last 1 Encounters:   06/08/23 5' 11\" (1.803 m)     Wt Readings from Last 1 Encounters:   06/08/23 242 lb 11.6 oz (110.1 kg)     Prior / Home Warfarin Regimen:  Patient [facility] per Cardiology with [facility]. Target INR range 1.8-2.2, with acceptable range noted as 1.5-2.5 as patient refuses frequent monitoring. Last INR check (prior to this hospitalization) was 5/24; INR was 1.9 at that time. Patient was continued on 5mg daily (35 mg/week), which he has been essentially stable on since Feb 2023.        Date INR Warfarin   5/30 2.43

## 2023-06-08 NOTE — PLAN OF CARE
Neurosensory - Adult  Goal: Achieves stable or improved neurological status  Outcome: Progressing  Note: PT continues to ambulate to the bathroom with his crutches, continues to report R elbow pain. Safety - Adult  Goal: Free from fall injury  Outcome: Progressing  Note: Utilizing GB for transfers and STEDY when needed.
Plan of Care    The patient's surgery today has been cancelled due to an emergency surgery requiring vascular surgery at another [facility]. The operation has been re-scheduled for Wednesday 6/7/2023.  Recommend remaining inpatient until surgery for anticoagulation.     - OK for general diet   - Resume therapeutic lovenox  - Please reduce disturbances over night    Ivan Gaming MD, MPH  PGY-4 General Surgery  06/05/23  2:00 PM
Problem: Neurosensory - Adult  Goal: Achieves maximal functionality and self care  Outcome: Progressing     Problem: Discharge Planning  Goal: Discharge to home or other facility with appropriate resources  Outcome: Progressing     Problem: Safety - Adult  Goal: Free from fall injury  6/6/2023 0812 by Brandon Brumfield RN  Outcome: Progressing    Fall risk precaution in place. Bed is locked and in lowest position. 2/4 side rails are up. Call light with in reach. Fall risk bracelet in place, non slip socks on.frequent check on patient. free from falls at this time. will continue to monitor. Problem: ABCDS Injury Assessment  Goal: Absence of physical injury  6/6/2023 0812 by Brandon Brumfield RN  Outcome: Progressing     Problem: Skin/Tissue Integrity  Goal: Absence of new skin breakdown  Description: 1. Monitor for areas of redness and/or skin breakdown  2. Assess vascular access sites hourly  3. Every 4-6 hours minimum:  Change oxygen saturation probe site  4. Every 4-6 hours:  If on nasal continuous positive airway pressure, respiratory therapy assess nares and determine need for appliance change or resting period.   Outcome: Progressing
Problem: Neurosensory - Adult  Goal: Achieves stable or improved neurological status  Outcome: Progressing     Problem: Safety - Adult  Goal: Free from fall injury  Outcome: Progressing     Problem: ABCDS Injury Assessment  Goal: Absence of physical injury  Outcome: Progressing
Problem: Neurosensory - Adult  Goal: Achieves stable or improved neurological status  Outcome: Progressing     Problem: Safety - Adult  Goal: Free from fall injury  Outcome: Progressing     Problem: ABCDS Injury Assessment  Goal: Absence of physical injury  Outcome: Progressing     Problem: Skin/Tissue Integrity  Goal: Absence of new skin breakdown  Description: 1. Monitor for areas of redness and/or skin breakdown  2. Assess vascular access sites hourly  3. Every 4-6 hours minimum:  Change oxygen saturation probe site  4. Every 4-6 hours:  If on nasal continuous positive airway pressure, respiratory therapy assess nares and determine need for appliance change or resting period.   Outcome: Progressing
Problem: Neurosensory - Adult  Goal: Achieves stable or improved neurological status  Outcome: Progressing  Flowsheets (Taken 6/7/2023 2000)  Achieves stable or improved neurological status:   Assess for and report changes in neurological status   Initiate measures to prevent increased intracranial pressure   Monitor temperature, glucose, and sodium.  Initiate appropriate interventions as ordered   Maintain blood pressure and fluid volume within ordered parameters to optimize cerebral perfusion and minimize risk of hemorrhage  Goal: Achieves maximal functionality and self care  Outcome: Progressing  Flowsheets (Taken 6/7/2023 2000)  Achieves maximal functionality and self care:   Monitor swallowing and airway patency with patient fatigue and changes in neurological status   Encourage and assist patient to increase activity and self care with guidance from physical therapy/occupational therapy   Encourage visually impaired, hearing impaired and aphasic patients to use assistive/communication devices     Problem: Discharge Planning  Goal: Discharge to home or other facility with appropriate resources  Outcome: Progressing  Flowsheets (Taken 6/7/2023 2000)  Discharge to home or other facility with appropriate resources:   Identify barriers to discharge with patient and caregiver   Arrange for needed discharge resources and transportation as appropriate   Identify discharge learning needs (meds, wound care, etc)   Arrange for interpreters to assist at discharge as needed     Problem: Safety - Adult  Goal: Free from fall injury  Outcome: Progressing  Flowsheets (Taken 6/7/2023 1946)  Free From Fall Injury:   Instruct family/caregiver on patient safety   Based on caregiver fall risk screen, instruct family/caregiver to ask for assistance with transferring infant if caregiver noted to have fall risk factors     Problem: ABCDS Injury Assessment  Goal: Absence of physical injury  Outcome: Progressing  Flowsheets (Taken
Problem: Safety - Adult  Goal: Free from fall injury  6/6/2023 0013 by Crow Acosta RN  Outcome: Progressing   Fall precautions are in place. Problem: ABCDS Injury Assessment  Goal: Absence of physical injury  Outcome: Progressing     Problem: Pain  Goal: Verbalizes/displays adequate comfort level or baseline comfort level  Outcome: Progressing   Pain is managed by rest and medication.
Collaborate with multidisciplinary team to address chronic and comorbid conditions and prevent exacerbation or deterioration   Monitor and assess patient's chronic conditions and comorbid symptoms for stability, deterioration, or improvement   Update acute care plan with appropriate goals if chronic or comorbid symptoms are exacerbated and prevent overall improvement and discharge     Problem: Pain  Goal: Verbalizes/displays adequate comfort level or baseline comfort level  6/8/2023 1532 by Olman Aguilera RN  Outcome: Adequate for Discharge

## 2023-06-08 NOTE — DISCHARGE INSTR - MEDS
Please resume taking your oral warfarin medication at home 5 mg daily. Please, if you are able, make it to the warfarin clinic on Monday or as soon as you can around Monday to have your PT and INR checked. This is very important to her restarting warfarin. Please inject 1 mL of Lovenox into the skin as instructed here in the hospital 2 times daily. Once in the morning and once in the evening. The vascular surgery team as well would like you to take 1 aspirin 81 mg daily.

## 2023-06-08 NOTE — DISCHARGE SUMMARY
over the years his right leg has become a bit weaker. He uses crutches to get around. In the ED, vitals were /82, HR 65, RR 18, SpO2 97% on room air, and temp 98.1F. Labs were within normal limits. CT head did not show any acute hemorrhage or mass effect. CTA head and neck showed a \"Fusiform aneurysm of the left proximal internal carotid artery with mural thrombus and aneurysmal sac, which measures 1.4 cm\". Vascular surgery was consulted and recommend aneurysm repair, so patient will be admitted. The following issues were addressed during hospitalization:    Right upper extremity tingling and weakness secondary to stroke likely secondary to aneurysm in the proximal left ICA. Patient presented to the hospital with right upper extremity tingling as well as weakness. Patient had significant imaging which showed strokes as well as left proximal ICA aneurysm. Patient was transitioned off of his at home warfarin and onto Lovenox for prophylaxis. Patient was able to undergo left ICA aneurysmal repair with vascular surgery Dr. Casi Palafox. Patient transition for 1 day to the ICU as there were some intraoperative difficulties in which vascular surgeon encountered some liquefied atherosclerotic plaque in the left proximal ICA. Patient needed some dopamine intraoperatively. Patient was also on a nitro drip for short course in the ICU. Patient was able to be transitioned off of the nitro drip soon after he transition to the ICU. After extensive conversations with the vascular surgery team, it was determined that he could be also transitioned off of his heparin drip and onto Lovenox bridge back onto her outpatient warfarin. Patient was also very eager to leave, so he is in agreement with this course of treatment. Right elbow pain  Patient noted that he had right upper elbow stiffness as well as pain with range of motion.   Seen by orthopedic surgery while inpatient and had radiographs which showed slight

## 2023-06-08 NOTE — PROGRESS NOTES
A-line rewired and tubing changed per surgery as A-line was not correlating. New dressing in place. HTN meds ordered and SBP 90 - 180.
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 Continues to note R elbow pain, as well as minor L neck pain. Dry mouth; no nausea/vomiting or cough. ROS: A 14 point review of systems was conducted, significant findings as noted above. All other systems negative. OBJECTIVE:   Vitals:   Vitals:    06/07/23 1805 06/07/23 1815 06/07/23 1816 06/07/23 1857   BP:  128/88  (!) 138/96   Pulse: 99 96 96 (!) 104   Resp:  10 14 16   Temp:  97 °F (36.1 °C)  97.8 °F (36.6 °C)   TempSrc:  Temporal  Oral   SpO2:  97%  98%   Weight:       Height:           I/O:     I/O    Intra op    Post op     Fluids  2300 mL 466 mL      mL 0 mL     Urine 395 mL 400 mL       Diet: ADULT DIET; Clear Liquid      Physical Examination:   General appearance: alert, no acute distress, grooming appropriate  HEENT: Normocephalic, EOMI, PERRLA, no scleral icterus  Neck: Left-sided incision clean/dry/intact, hemostatic, overlying Prineo, no underlying hematoma  Chest/Lungs: Normal work of breathing on 3L NC, symmetric chest rise  Cardiovascular: Regular rate, irregularly irregular rhythm  Abdomen: soft, non-tender, non-distended  Skin: warm and dry, no rashes  Extremities: no edema, no cyanosis  Neuro: A&Ox3, CN II-XII intact, no gross peripheral motor deficits    Labs:  CBC:   Recent Labs     06/05/23  0710 06/07/23  0508   WBC 7.4 6.9   HGB 14.5 14.7   HCT 43.0 43.3    222       BMP:   Recent Labs     06/05/23  0710 06/07/23  0508    136   K 4.6 3.9    101   CO2 30 25   BUN 12 12   CREATININE <0.5* <0.5*   GLUCOSE 87 92     LFT's: No results for input(s): AST, ALT, ALB, BILITOT, ALKPHOS in the last 72 hours. Troponin: No results for input(s): TROPONINI in the last 72 hours. BNP: No results for input(s): BNP in the last 72 hours.   ABGs: No results for input(s): PHART, LMG6RKU, PO2ART in the last 72
Informed consent within chart, PT bathed before surgery, IVF infusing. PT has been NPO since midnight.
Internal Medicine - Progress Note    Date:  2023   Patient: Gabriel Cisneros   Patient : 1945      CC:  Right arm weakness    Interval History  Patient seen the bedside this morning. No new complaints overnight. Blood pressure has been in the 160s over 80s. Heart rate has been well controlled however. Patient remains afebrile. Laboratory studies this morning well within normal limits. Plan is for patient to have left ICA aneurysm with mural thrombus repair today with vascular surgery. We will follow-up on postoperative recommendations. Patient has been having some elbow pain, will reassess once out of surgery to see if xrays are warranted at this time. HPI:  Elana Molina is a 68 y.o. male who presented to the ED on 2023 due to right arm weakness and tingling. Medical history includes A fib on warfarin, Grave's diease, polio in childhood. Further medical history listed below. Patient reports that on , he woke up with right arm weakness and tingling. In particular, he had problems with dexterity, noting it was hard to use a fork. He did not have any numbness or pain and felt like his gross motor strength was in tact. The next day (), he reports that his symptoms were no longer present. He still wanted to come to the ED and get checked out since these symptoms have not occurred before. The only reason he didn't come into the ED on day of presentation, was because he imagined the wait would be much longer since it was a holiday. During this time, he denies dizziness, headaches, N/V, SOB, chest pain, abdominal pain, changes in bowel habits, or dysuria. He has decreased vision at baseline resulting from his Grave's disease, but that has not changed in the past few days. Of note, patient has some baseline weakness in his leg since having polio when he was 9years old. He reports that it was mostly left leg weakness but over the years his right leg has become a bit weaker.  He
Internal Medicine - Progress Note    Date:  2023   Patient: Pauline Cisneros   Patient : 1945      CC:  Right arm weakness    Interval History  Patient seen the bedside this morning. No new complaints overnight. Patient's laboratory studies have been appreciated and have been noted to be largely stable. Vitals appreciated also largely stable. Diet resumed as patient will have left internal carotid artery aneurysmal repair now on Wednesday after operation was canceled for yesterday. Patient's left arm is still having some reduced range of motion and is sore. Orthopedic surgery following. HPI:  Milad Chahal is a 68 y.o. male who presented to the ED on 2023 due to right arm weakness and tingling. Medical history includes A fib on warfarin, Grave's diease, polio in childhood. Further medical history listed below. Patient reports that on , he woke up with right arm weakness and tingling. In particular, he had problems with dexterity, noting it was hard to use a fork. He did not have any numbness or pain and felt like his gross motor strength was in tact. The next day (), he reports that his symptoms were no longer present. He still wanted to come to the ED and get checked out since these symptoms have not occurred before. The only reason he didn't come into the ED on day of presentation, was because he imagined the wait would be much longer since it was a holiday. During this time, he denies dizziness, headaches, N/V, SOB, chest pain, abdominal pain, changes in bowel habits, or dysuria. He has decreased vision at baseline resulting from his Grave's disease, but that has not changed in the past few days. Of note, patient has some baseline weakness in his leg since having polio when he was 9years old. He reports that it was mostly left leg weakness but over the years his right leg has become a bit weaker. He uses crutches to get around.       In the ED, vitals were /82,
Internal Medicine - Progress Note    Date:  2023   Patient: Surinder Cisneros   Patient : 1945      CC:  Right arm weakness    Interval History  Spoke to the patient at the bedside this morning. No new complaints overnight. Patient tolerated left ICA aneurysmal repair well yesterday. However, patient did have some liquefied atherosclerotic plaques in the left ICA which added an additional layer of complexity to the patient's operation. Otherwise, patient denies any new complaints including shortness of breath, chest pain, headache, nausea, vomiting, or diarrhea. Laboratory studies appreciated this morning and have been noted to be largely stable. Vitals appreciated this morning also noted to be largely stable. HPI:  Nikhil Chua is a 66 y.o. male who presented to the ED on 2023 due to right arm weakness and tingling. Medical history includes A fib on warfarin, Grave's diease, polio in childhood. Further medical history listed below. Patient reports that on , he woke up with right arm weakness and tingling. In particular, he had problems with dexterity, noting it was hard to use a fork. He did not have any numbness or pain and felt like his gross motor strength was in tact. The next day (), he reports that his symptoms were no longer present. He still wanted to come to the ED and get checked out since these symptoms have not occurred before. The only reason he didn't come into the ED on day of presentation, was because he imagined the wait would be much longer since it was a holiday. During this time, he denies dizziness, headaches, N/V, SOB, chest pain, abdominal pain, changes in bowel habits, or dysuria. He has decreased vision at baseline resulting from his Grave's disease, but that has not changed in the past few days. Of note, patient has some baseline weakness in his leg since having polio when he was 9years old.  He reports that it was mostly left leg weakness but
No family in family waiting room at [phone].
Nutrition Note       NUTRITION RECOMMENDATIONS:   1. PO Diet: Continue Regular diet  2. ONS: No current indication    NUTRITION ASSESSMENT:  Nutritional summary & status: LOS: Pt on a regular diet, consumed 100% of breakfast this morning, has been NPO on and off during admission, however during periods where he can eat intakes are >50%. No current indication for ONS. No N/V or abdominal pain, pt reports his appetite was fine prior to admission. No recent wt loss, pt endorses trying to lose wt. Continues on heparin, nitro drip stopped this AM. Pt is at low nutritional risk, will continue to monitor per protocol. 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 Consult dietitian if nutrition interventions essential to patient care is needed.      Raoul Jackson, [address] [place] [address], [address]:  [phone]
Occupational/Physical Therapy    Attempted PT/OT per orders. Pt just received breakfast and declined evaluations until after he eats. Will return later today. Addendum: Noted canceled therapy order after attempt. Notified RN that PT/OT will need new orders when team feels pt is ready for therapy.      Linda Lu, OTR/L, [address] [facility], PT [phone]
Ortho  Contacted via [employer] re consult on this 68yo male admitted post CVA and fall for \"R elbow pain\". Review of images demonstrates severe OA with erosive changes, often noted in inflammatory arthritis. NO fractures. Nonetheless, no casting or immobilization indicated due to propensity of elbow for stiffness. Encourage gentle ROM, edema control, may need OT for hand ROM and supportive care with simple sling or meds as indicated.   Will see him this afternoon and full consult to follow  TW
PACU Transfer to Floor Note    Procedure(s):  LEFT CAROTID ARTERY ANEURYSM REPAIR    Current Allergies: Naproxen    Pt meets criteria as per Lacie Score and ASPAN Standards to transfer to next phase of care. No results for input(s): POCGLU in the last 72 hours. Vitals:    06/07/23 1816   BP:    Pulse: 96   Resp: 14   Temp:    SpO2:      Vitals within 20% of pt's admission vitals as per LACIE SCORE    SpO2: 97 %    O2 Flow Rate (L/min): 3 L/min      Intake/Output Summary (Last 24 hours) at 6/7/2023 1826  Last data filed at 6/7/2023 1815  Gross per 24 hour   Intake 2666 ml   Output 995 ml   Net 1671 ml       Pain assessment:  present - adequately treated    Pain Level: 4    Patient was assessed for alterations to skin integrity. There were not alterations observed. Personally spoke to patient's girlfriend, Mai Hutchinson, with update given along with inpatient room number. Mai Hutchinson will be here in the morning. Is patient incontinent: no    Handoff report given at bedside.    Family updated and directed to pt room      6/7/2023 6:26 PM
PRE-OP NOTE  Department of Surgery      Chief Complaint or Reason for Surgery: Left proximal internal carotid artery aneurysm    Procedure: Left carotid artery aneurysm repair  Expected time: 23, 1200    Plan  1. Diet: NPO at midnight  2. IVF:  cc/hr  3. Antibiotics: Ancef OCTOR  4. Labs to be drawn: CBC, renal, mag. Type and Screen (), INR ()  5. Anesthesia: to see patient  6. Consent: on chart  7. Pulmonary: CXR:   8. Cardiac: EK/31  9.  Prophylaxis: therapeutic-dose lovenox to be held at midnight    JOSE Bass NP  23  8:18 AM
PT's surgery cancelled d/t Dr. Rizwana Ceballos needing to complete surgery at another hospital. Notified residents and physician.
Patient admitted to PACU #16 from OR per ICU bed at 1606 s/p LEFT CAROTID ARTERY ANEURYSM REPAIR - Left. Report received at bedside in PACU per CRNA, OR nurse and Dr. Sandi Larios. Patient was reported to be hypotensive in OR which he received medication for, see anesthesia record. No complications reported. OR nurse informed me that patient states right elbow hurts very bad and CRNA stated that patient had this same complaint prior to surgery. Dr. Lilo Navarrete at bedside soon after patient arrived to PACU and was informed of patient's complaint of his right elbow being very painful. Patient connected to PACU monitoring equipment  IVF's infusing with site unremarkable. Left neck surgical dressing remains C,D,I with surgical glue with incision well approximated without any puffiness noted. Neurological assessment remains negative, see flow sheet. Lazo catheter in place and draining. Left radial arterial line in place and connected to PACU monitor. No further changes. Will continue to monitor.
Patient bed not inflating. Pt moved over to new ICU bed. Tolerated well. Heparin gtt started. Neuro checks unchanged. Clear liquid diet started and tolerating well. Will continue to monitor.
Patient brought to PACU for pre-op at 1125 in bed. Dr. Karishma Espinoza and Dr. Jl Browne have both been in to speak to patient with both consents signed. Patient states his girlfriend is here with him today. IV right AC intact and flushes without any difficulties and has a good blood return noted. Antibiotic hanging on IV pole on patient's bed. Patient verbalizes no complaints.  See pre-op check list.
Patient is alert and oriented x 4, neuro and neurovascular checks are WNL, surgical incision is clean/dry/intact.
Point of Care Note:  Vascular Surgery  Rony Cisneros    11:13 PM  6/7/2023    Discordant L radial A-line and cuff pressures    RN notified residents regarding discordant blood pressures between A-line and cuff pressures. Attempted replacing A-line over a wire, however, once the A-line was disconnected from the tubing, arterial pulsatile flow was appreciated. New tubing was obtained and reconnected to the current A-line catheter; and was sutured for security. A-line SBPs ranging from 170-190s with -110s. Cuff pressures ranging from 140-160s. Patient tolerated procedures without issues.     Order 10 mg labetalol IV  SBP goals  mmHg  Continue Q1H neurovascular checks      Codey Ann DO, MSMEd  PGY-1, General Surgery  06/07/23  11:16 PM  [employer]  Pager: [phone]
Pt A&O x4, continent of bowel and bladder. SBA with crutches. Complaining of pain in RUE, limited mobility, edema noted with erythema. Encouraged pt to use arm as much as possible. BLE weakness, pt stated that is baseline. Consents confirmed and placed in chart. Pt denied any further needs at time, all safety measures in place.
Pt is A&O x4, VSS with exception to elevated blood pressure, medicated as per MAR. Pt has weakness on RUE. Up x1 with walker and GB. Tolerating diet and fluids, voiding adequately as per BRP. Plan for surgery tomorrow. Pt need to be NPO after midnight. All fall precaution is in place. Call light is within the reach.
Pt is A&Ox4. Stable vitals exception elevated BP, attending Resident aware no new order ,will continue monitor. No acute changes overnight. Pain controlled per MAR. Voiding adequately to the bathroom. Maintaining NPO from this midnight for surgery today. All fall precaution are in place. call light within a reach.
Pt is A&Ox4. VSS on RA. No acute changes this shift. Pt endorsing pain to elbow. Pain managed via medication per MAR. NPO at 0000. Ambulation x2 steady. Voiding via urinal. Denies needs at this time. Fall precautions in place, bed alarm on.
Pt transferred to OR, antibiotic sent with pt, all belongings secured to be taken to ICU room when assigned, significant other notified.
Report called to [facility], RN receiving patient in ICU at [phone] with all information provided including all medications given while in PACU.
VSS. Alert and oriented x4. Pain is managed by medication and rest. Pt voiding adequately via BRP. Ambulated with gait belt and crutches x1 assist. Call light is within pt's reach. Fall precautions are in place. Plan of care is ongoing.
Vascular Surgery Daily Progress Note      CC: L ICA aneurysm with mural thrombus     Subjective :  Complaining of RUE pain that radiates to the elbow. Rates at 5/10. Sensation intact. No N/T. Able to wiggle fingers. No overnight events. HDS and on RA. Objective     Exam:  Vitals:    06/04/23 2230 06/05/23 0155 06/05/23 0230 06/05/23 0615   BP: (!) 166/82  (!) 144/75 (!) 163/84   Pulse: 71  69 85   Resp: 16 16 16 16   Temp: 98.2 °F (36.8 °C)  98.7 °F (37.1 °C) 97.9 °F (36.6 °C)   TempSrc: Oral  Oral Oral   SpO2: 96%  95% 97%   Weight:       Height:           General appearance: alert, no acute distress, grooming appropriate  Eyes: No scleral icterus, EOM grossly intact  Neck: trachea midline, no JVD, no lymphadenopathy, neck supple  Chest/Lungs: Normal effort with no accessory muscle use on RA  Cardiovascular: RRR  Abdomen: Soft, non-tender, non-distended, no rebound, guarding, or rigidity. Skin: warm and dry, no rashes  Extremities: no edema, no cyanosis. RUE pain w/ limited  ROM. Neuro: A&Ox3, no focal deficits, sensation intact, CN 2-12 grossly intact. BLE motor weakness 4/5 (baseline). ASSESSMENT/PLAN:   This is a 68 y.o. male w/ L ICA aneurysm with mural thrombus and R sided weakness. Brain MRI was completed which showed recent, likely early subacute, infarcts within the left frontal precentral gyrus on the left parietal postcentral gyrus as well as an additional focus of recent infarct within the left frontal corona radiata white matter. - Continue to hold warfarin  - Continue therapeutic Lovenox today.  Will hold AM dose   - NPO since midnight  - OR today: Repair of L internal carotid aneurysm  - Continue to monitor for neurologic changes  - Remainder of care per primary       Jaky Kearns MS3  Vascular surgery  6/5/23 6:20AM
Vascular Surgery Daily Progress Note      CC: L ICA aneurysm with mural thrombus     Subjective :  Rested well overnight. No complaints at this time. Ready for surgery today    Interval history:  Remained afebrile and HDS overnight      Objective     Exam:  Vitals:    06/04/23 2230 06/05/23 0155 06/05/23 0230 06/05/23 0615   BP: (!) 166/82  (!) 144/75 (!) 163/84   Pulse: 71  69 85   Resp: 16 16 16 16   Temp: 98.2 °F (36.8 °C)  98.7 °F (37.1 °C) 97.9 °F (36.6 °C)   TempSrc: Oral  Oral Oral   SpO2: 96%  95% 97%   Weight:       Height:           General appearance: alert, no acute distress, grooming appropriate  Eyes: No scleral icterus, EOM grossly intact  Neck: trachea midline, no JVD, no lymphadenopathy, neck supple  Chest/Lungs: Normal effort with no accessory muscle use on RA  Cardiovascular: RRR  Abdomen: Soft, non-tender, non-distended, no rebound, guarding, or rigidity. Skin: warm and dry, no rashes  Extremities: no edema, no cyanosis  Neuro: A&Ox3, no focal deficits, sensation intact, CN 2-12 grossly intact. BLE motor weakness 4/5 (baseline). ASSESSMENT/PLAN:   This is a 68 y.o. male w/ L ICA aneurysm with mural thrombus and R sided weakness. Brain MRI was completed which showed recent, likely early subacute, infarcts within the left frontal precentral gyrus on the left parietal postcentral gyrus as well as an additional focus of recent infarct within the left frontal corona radiata white matter.     - Continue to hold warfarin  - Hold AM lovenox today  - OR today; Repair of L internal carotid aneurysm  - Continue to monitor for neurologic changes  - Remainder of care per primary       Moreno Lopez DO  PGY1, General Surgery  06/05/23  6:47 AM  [employer]  Pager: [phone]
Vascular Surgery Daily Progress Note      CC: L ICA aneurysm with mural thrombus     Subjective :  Rested well overnight. No complaints at this time. Ready for surgery today    Interval history:  Remained afebrile and HDS overnight      Objective     Exam:  Vitals:    06/06/23 1432 06/06/23 1856 06/06/23 2230 06/07/23 0300   BP: (!) 161/83 (!) 168/89 (!) 174/89 (!) 166/88   Pulse: (!) 101 86 (!) 101 96   Resp:   16 18   Temp: 99.2 °F (37.3 °C) 98.5 °F (36.9 °C) 98.5 °F (36.9 °C) 98 °F (36.7 °C)   TempSrc: Oral Oral Oral Oral   SpO2: 95% 94% 95% 92%   Weight:       Height:           General appearance: alert, no acute distress, grooming appropriate  Eyes: No scleral icterus, EOM grossly intact  Neck: trachea midline, no JVD, no lymphadenopathy, neck supple  Chest/Lungs: Normal effort with no accessory muscle use on RA  Cardiovascular: RRR  Abdomen: Soft, non-tender, non-distended, no rebound, guarding, or rigidity. Skin: warm and dry, no rashes  Extremities: no edema, no cyanosis  Neuro: A&Ox3, no focal deficits, sensation intact, CN 2-12 grossly intact. BLE motor weakness 4/5 (baseline). ASSESSMENT/PLAN:   This is a 66 y.o. male w/ L ICA aneurysm with mural thrombus and R sided weakness.  Brain MRI was completed which showed recent, likely early subacute, infarcts within the left frontal precentral gyrus on the left parietal postcentral gyrus as well as an additional focus of recent infarct within the left frontal corona radiata white matter.    - NPO  - Continue to hold warfarin  - Hold AM lovenox today  - OR today; Repair of L internal carotid aneurysm  - Continue to monitor for neurologic changes  - Remainder of care per primary     Chiquita Denver, CNP  6/7/2023  6:16 AM  carmelo
66 y.o. male with left ICA aneurysm w mural thrombus s/p L ICA aneurysm repair (6/7). POD#1     Neuro:   Analgesia  - prn acetaminophen, hydromorphone, oxycodone     Sedation  - none     Psych  - prn ativan for anxiety     Elevated post-op stroke risk  - q1 hr neurovascular checks      Hx of polio  - chronic LLE weakness     Cardiovascular:   L ICA aneurysm  - s/p L ICA aneurysm repair (6/7), POD #1  - SBP goals               - prn nitroglycerine, dopamine drips to maintain range  - Will remove a-line after nitro gtt is discontinued   - Heparin gtt 750U/hr started at 6/7 20:30     Chronic atrial fibrillation  - Outpatient INR goal 1.8-2.2 on Warfarin  - Heparin gtt 750U/hr started at 6/7 20:30  - Will talk to staff today about resuming Warfarin      Hypertension  - continue home atenolol     Hyperlipidemia  - continue home pravastain     Last Echo (5/31/23): LVEF = 55-60%     Pulmonary:   Extubated post-operatively  Aspiration precautions: elevated HOB, IS  Pulmonary toilet: IS, avoid aggressive coughing if possible     GI:   Bowel regimen: Miralax BID     FEN:   IV fluids: Will DC this morning   Replace electrolytes per protocol  Diet: will advance to general diet today      /Renal:   Creatinine <0.5 from <0.5  UOP: 1,402 cc/24 hrs = 0.53 cc/kg   - Will remove Lazo this morning.  Pt will be a void check      Hem/ID:   - 200cc EBL  - Hemoglobin 14.5 from 14.7 pre-op  - Hgb 13.3 this morning      - WBC 10.8 from 6.9 pre-op   - WBC 10.2 this morning      Endo:   Hypothyroidism, history of Grave's disease  - continue home Synthroid 125mcg  - last pre-op TSH normal (3.12)     Glucose: 158, 92, 87, 105     Prophylaxis:   DVT Ppx: Heparin gtt  GI Ppx: n/a     Access:  Central Access: n/a  Peripheral Access: R AC, L basilic  Arterial Line Access: L radial (6/7, replaced 6/8)                                 Lazo Date placed: 6/7  NGT Date placed: n/a  ETT Date placed: 6/7-6/7      Mobility:  Up ad enrrique     Dispo:
INR:   Recent Labs     06/05/23  0709   INR 1.05       Imaging:  XR ELBOW RIGHT (MIN 3 VIEWS)   Final Result      Osteoarthritis in the elbow. There is soft tissue swelling along with an effusion. There appears to be erosive change of the radial head but this is not well profiled on this exam. Underlying inflammatory arthropathy could be present. MRI BRAIN WO CONTRAST   Final Result   1. Recent, likely early subacute, infarcts within the left frontal precentral gyrus on the left parietal postcentral gyrus as well as an additional focus of recent infarct within the left frontal corona radiata white matter. Electronically signed by Rosangela Dolan MD      CTA [facility] Neck W/Contrast   Final Result   1. No evidence of flow significant stenosis in the head or neck. 2. Fusiform aneurysm of the left proximal internal carotid artery with mural thrombus and aneurysmal sac, which measures 1.4 cm. 3. Nodule in the right parotid gland, measuring 14 mm. Differential diagnosis includes benign versus malignant parotid neoplasms versus an intraparotid lymph node. 4. Groundglass opacities with areas of interlobular septal thickening in the in the upper lungs, in a pattern suggestive of pulmonary edema. 5. 7 mm right upper lobe nodule. Following the Fleischner Society 2017 guidelines for single solid nodules 6-8 mm, if patient is low risk, then CT is suggested at 6-12 months from initial scan with subsequent CT at 18-24 months. If patient is high risk,    then CT is suggested at 6-12 months from initial scan and then at 18-24 months.  qzxv      CT Head W/O Contrast   Final Result      No acute intracranial hemorrhage or mass effect. Mild chronic small vessel ischemic change. Lobular soft tissue lesion centered in the right parotid gland, nonspecific. Consider follow-up nonemergent ENT evaluation. XR CHEST (2 VW)   Final Result   1.  Low lung volumes with cardiomegaly and vascular redistribution

## 2023-06-09 ENCOUNTER — CARE COORDINATION (OUTPATIENT)
Dept: CASE MANAGEMENT | Age: 78
End: 2023-06-09

## 2023-06-09 NOTE — CARE COORDINATION
Care Transitions Outreach Attempt    Call within 2 business days of discharge: Yes   Attempted to reach patient for transitions of care follow up. Unable to reach patient. Patient: Drake Leroy Patient : 1945 MRN: [id number]    Last Discharge  Street       Date Complaint Diagnosis Description Type Department Provider    23 Extremity Weakness Dysmetria ED to Hosp-Admission (Discharged) (ADMITTED) [facility] Sujata Hernandez MD; [place]. .. Was this an external facility discharge?  No Discharge Facility: NA    Noted following upcoming appointments from discharge chart review:   [facility] follow up appointment(s):   Future Appointments   Date Time Provider Ewa Feliz   7/3/2023  2:30 PM Cecil Rider MD [facility] [place] - [facility]   2023  9:15 AM Duglas Gray [facility]   2023  2:45 PM Renay Taylor MD [facility]     Non-[facility] follow up appointment(s): NA

## 2023-06-14 ENCOUNTER — TELEPHONE (OUTPATIENT)
Dept: CASE MANAGEMENT | Age: 78
End: 2023-06-14

## 2023-06-15 PROBLEM — K11.8 PAROTID NODULE: Status: ACTIVE | Noted: 2023-06-15

## 2023-06-15 PROBLEM — R91.1 LUNG NODULE: Status: ACTIVE | Noted: 2023-06-15

## 2023-06-22 ENCOUNTER — OFFICE VISIT (OUTPATIENT)
Dept: VASCULAR SURGERY | Age: 78
End: 2023-06-22

## 2023-06-22 VITALS
BODY MASS INDEX: 33.88 KG/M2 | HEIGHT: 71 IN | OXYGEN SATURATION: 99 % | SYSTOLIC BLOOD PRESSURE: 158 MMHG | TEMPERATURE: 98.7 F | DIASTOLIC BLOOD PRESSURE: 83 MMHG | WEIGHT: 242 LBS | HEART RATE: 93 BPM

## 2023-06-22 DIAGNOSIS — I65.22 STENOSIS OF LEFT CAROTID ARTERY: Primary | ICD-10-CM

## 2023-06-22 PROCEDURE — 99024 POSTOP FOLLOW-UP VISIT: CPT | Performed by: SURGERY

## 2023-06-22 NOTE — PROGRESS NOTES
2023     Margarette Cisneros (:  1945) is a 66 y.o. male,Established patient, here for evaluation of the following chief complaint(s):  Post-Op Check (1st post-op s/p left carotid artery aneurysm repair 23)        ASSESSMENT/PLAN:  1. Stenosis of left carotid artery  -     VL Carotid Left; Future  Doing well. Return in 3 months with duplex. SUBJECTIVE/OBJECTIVE:  POV#1:  Left CEA 2023. No complaints. Home now with S.O. Feels at baseline. No further neuro events. Walking at baseline with crutches. Meds reviewed:  ASA, Pravastatin, Warfarin    Exam:  Awake, alert, oriented x 3. NAD. Affect appropriate. Left neck with mild expected post-op swelling. Incision c/d/I without drainage or erythema. Neuro no slurred speech, no facial droop. Physical Exam  Vitals:    23 0859   BP: (!) 158/83   Site: Left Upper Arm   Position: Sitting   Pulse: 93   Temp: 98.7 °F (37.1 °C)   TempSrc: Infrared   SpO2: 99%   Weight: 242 lb (109.8 kg)   Height: 5' 11\" (1.803 m)       An electronic signature was used to authenticate this note.     --Gautam Mcgill MD

## 2023-06-26 DIAGNOSIS — K04.7 TOOTH ABSCESS: ICD-10-CM

## 2023-06-26 DIAGNOSIS — N20.0 KIDNEY STONES: ICD-10-CM

## 2023-06-26 DIAGNOSIS — M62.81 POST-POLIO MUSCLE WEAKNESS: ICD-10-CM

## 2023-06-26 DIAGNOSIS — B91 POST-POLIO MUSCLE WEAKNESS: ICD-10-CM

## 2023-06-26 DIAGNOSIS — Z12.5 PROSTATE CANCER SCREENING: ICD-10-CM

## 2023-06-26 DIAGNOSIS — Z12.11 COLON CANCER SCREENING: ICD-10-CM

## 2023-06-26 DIAGNOSIS — E78.5 HYPERLIPIDEMIA, UNSPECIFIED HYPERLIPIDEMIA TYPE: ICD-10-CM

## 2023-06-26 DIAGNOSIS — Z00.00 WELL ADULT EXAM: ICD-10-CM

## 2023-06-26 DIAGNOSIS — K59.00 CONSTIPATION, UNSPECIFIED CONSTIPATION TYPE: ICD-10-CM

## 2023-06-26 DIAGNOSIS — I48.91 ATRIAL FIBRILLATION, UNSPECIFIED TYPE (HCC): ICD-10-CM

## 2023-06-26 DIAGNOSIS — E07.9 THYROID DISEASE: ICD-10-CM

## 2023-06-28 ENCOUNTER — TELEPHONE (OUTPATIENT)
Dept: ENT CLINIC | Age: 78
End: 2023-06-28

## 2023-06-29 RX ORDER — LEVOTHYROXINE SODIUM 0.12 MG/1
TABLET ORAL
Qty: 90 TABLET | Refills: 1 | Status: SHIPPED | OUTPATIENT
Start: 2023-06-29

## 2023-07-03 ENCOUNTER — OFFICE VISIT (OUTPATIENT)
Dept: INTERNAL MEDICINE CLINIC | Age: 78
End: 2023-07-03
Payer: MEDICARE

## 2023-07-03 VITALS
OXYGEN SATURATION: 97 % | WEIGHT: 242 LBS | BODY MASS INDEX: 33.75 KG/M2 | SYSTOLIC BLOOD PRESSURE: 130 MMHG | TEMPERATURE: 97.5 F | HEART RATE: 55 BPM | DIASTOLIC BLOOD PRESSURE: 80 MMHG

## 2023-07-03 DIAGNOSIS — R60.9 PERIPHERAL EDEMA: ICD-10-CM

## 2023-07-03 DIAGNOSIS — Z00.00 MEDICARE ANNUAL WELLNESS VISIT, SUBSEQUENT: Primary | ICD-10-CM

## 2023-07-03 DIAGNOSIS — K11.8 PAROTID NODULE: ICD-10-CM

## 2023-07-03 DIAGNOSIS — R91.1 LUNG NODULE: ICD-10-CM

## 2023-07-03 DIAGNOSIS — I63.529 ACUTE ISCHEMIC CEREBROVASCULAR ACCIDENT (CVA) INVOLVING ANTERIOR CEREBRAL ARTERY TERRITORY (HCC): ICD-10-CM

## 2023-07-03 DIAGNOSIS — I48.20 CHRONIC ATRIAL FIBRILLATION (HCC): ICD-10-CM

## 2023-07-03 PROBLEM — D68.69 SECONDARY HYPERCOAGULABLE STATE (HCC): Status: RESOLVED | Noted: 2023-04-27 | Resolved: 2023-07-03

## 2023-07-03 PROCEDURE — 1123F ACP DISCUSS/DSCN MKR DOCD: CPT | Performed by: INTERNAL MEDICINE

## 2023-07-03 PROCEDURE — 3079F DIAST BP 80-89 MM HG: CPT | Performed by: INTERNAL MEDICINE

## 2023-07-03 PROCEDURE — 3075F SYST BP GE 130 - 139MM HG: CPT | Performed by: INTERNAL MEDICINE

## 2023-07-03 PROCEDURE — G0439 PPPS, SUBSEQ VISIT: HCPCS | Performed by: INTERNAL MEDICINE

## 2023-07-03 RX ORDER — FUROSEMIDE 40 MG/1
40 TABLET ORAL DAILY
Qty: 30 TABLET | Refills: 5 | Status: SHIPPED | OUTPATIENT
Start: 2023-07-03

## 2023-07-03 SDOH — ECONOMIC STABILITY: HOUSING INSECURITY
IN THE LAST 12 MONTHS, WAS THERE A TIME WHEN YOU DID NOT HAVE A STEADY PLACE TO SLEEP OR SLEPT IN A SHELTER (INCLUDING NOW)?: NO

## 2023-07-03 SDOH — ECONOMIC STABILITY: INCOME INSECURITY: HOW HARD IS IT FOR YOU TO PAY FOR THE VERY BASICS LIKE FOOD, HOUSING, MEDICAL CARE, AND HEATING?: NOT HARD AT ALL

## 2023-07-03 SDOH — ECONOMIC STABILITY: FOOD INSECURITY: WITHIN THE PAST 12 MONTHS, YOU WORRIED THAT YOUR FOOD WOULD RUN OUT BEFORE YOU GOT MONEY TO BUY MORE.: NEVER TRUE

## 2023-07-03 SDOH — ECONOMIC STABILITY: FOOD INSECURITY: WITHIN THE PAST 12 MONTHS, THE FOOD YOU BOUGHT JUST DIDN'T LAST AND YOU DIDN'T HAVE MONEY TO GET MORE.: NEVER TRUE

## 2023-07-03 ASSESSMENT — LIFESTYLE VARIABLES
HOW MANY STANDARD DRINKS CONTAINING ALCOHOL DO YOU HAVE ON A TYPICAL DAY: 1 OR 2
HOW OFTEN DO YOU HAVE A DRINK CONTAINING ALCOHOL: 2-3 TIMES A WEEK

## 2023-07-03 ASSESSMENT — PATIENT HEALTH QUESTIONNAIRE - PHQ9
SUM OF ALL RESPONSES TO PHQ QUESTIONS 1-9: 0
2. FEELING DOWN, DEPRESSED OR HOPELESS: 0
1. LITTLE INTEREST OR PLEASURE IN DOING THINGS: 0
SUM OF ALL RESPONSES TO PHQ QUESTIONS 1-9: 0
SUM OF ALL RESPONSES TO PHQ QUESTIONS 1-9: 0
SUM OF ALL RESPONSES TO PHQ9 QUESTIONS 1 & 2: 0
SUM OF ALL RESPONSES TO PHQ QUESTIONS 1-9: 0

## 2023-07-03 NOTE — ASSESSMENT & PLAN NOTE
-Seen incidentally on CT 05/30 during admission, 7 mm upper lobe will need CT chest to eval entire lung fields and in 6 months for f/u

## 2023-07-03 NOTE — PROGRESS NOTES
(General Surgery)  Chris Van MD as Consulting Physician (Cardiology)  Stephanie Hernandez MD as Consulting Physician (Vascular Surgery)     Reviewed and updated this visit:  Tobacco  Allergies  Meds  Med Hx  Surg Hx  Soc Hx  Fam Hx

## 2023-07-03 NOTE — ASSESSMENT & PLAN NOTE
Well controlled  -continue atenolol   -on coumadin per coumadin clinic  -sees dr Reeves [facility]

## 2023-07-03 NOTE — ASSESSMENT & PLAN NOTE
-recently admitted to the hospital with acute stroke s/p left ICA aneurysmal repair by Dr. Virgilio Bermeo.  Still with residual weakness  -declines PT  -plan for repeat carotid US for stenosis in 3 months   -on coumadin for Afib, statin

## 2023-07-12 DIAGNOSIS — I48.91 ATRIAL FIBRILLATION, UNSPECIFIED TYPE (HCC): Primary | ICD-10-CM

## 2023-07-14 ENCOUNTER — HOSPITAL ENCOUNTER (OUTPATIENT)
Dept: CT IMAGING | Age: 78
Discharge: HOME | End: 2023-07-14
Payer: MEDICARE

## 2023-07-14 DIAGNOSIS — R91.1 LUNG NODULE: ICD-10-CM

## 2023-07-14 PROCEDURE — 71250 CT THORAX DX C-: CPT

## 2023-07-19 ENCOUNTER — ANTI-COAG VISIT (OUTPATIENT)
Dept: PHARMACY | Age: 78
End: 2023-07-19

## 2023-07-19 DIAGNOSIS — I48.20 CHRONIC ATRIAL FIBRILLATION (HCC): Primary | ICD-10-CM

## 2023-07-19 NOTE — PROGRESS NOTES
ANTICOAGULATION SERVICE    Isaias Mcdonald is a 66 y.o. male with PMHx significant for A-fib, HTN, HLD who presents to clinic 7/19/2023 for anticoagulation monitoring and adjustment.     Anticoagulation Indication(s):  Afib    Referring Physician:  Dr. Beatriz Ruiz  Goal INR Range:  1.8-2.2, but 1.5-2.5 acceptable as patient refuses frequent INR monitoring  Duration of Anticoagulation Therapy:  Indefinite  Time of day dose taken:  PM  Product patient has at home:  warfarin 5 mg (peach)    [id number] Score for Atrial Fibrillation Stroke Risk   Risk   Factors  Component Value   C CHF No 0   H HTN Yes 1   A2 Age >= 76 Yes,  (74 y.o.) 2   D DM No 0   S2 Prior Stroke/TIA Yes 2   V Vascular Disease No 0   A Age 77-78 No,  (74 y.o.) 0   Sc Sex male 0    [id number]  Score  5   Score last updated 1/9/20 9:42 AM    INR Summary                            Warfarin regimen (mg)  Date INR   A/P    Sun Mon Tue Wed Thu Fri Sat Mg/wk  7/17 2.15 At goal, continue   5 5 5 5 5 5 5 35  6/14 1.7 Below goal, continue   5 5 5 5 5 5 5 35  5/24 1.9 At goal, continue   5 5 5 5 5 5 5 35  3/29 2.5 At goal, continue   5 5 5 5 5 5 5 35  2/1 2.1 At goal, continue   5 5 5 5 5 5 5 35  12/7 1.7 Below goal, continue   5 5 5 5 5 5 5 35  10/12 2.5 Above goal, continue   5 5 5 5 5 5 5 35  8/17 1.7 Below goal, continue  5 5 5 5 5 5 5 35  6/22 2.0 At goal, no change  5 5 5 5 5 5 5 35  4/27 2.2 At goal, no change  5 5 5 5 5 5 5 35  3/2 1.9 At goal, no change  5 5 5 5 5 5 5 35  1/12 1.8 At goal, no change  5 5 5 5 5 5 5 35   12/1 1.5 Below goal, continue  5 5 5 5 5 5 5 35  10/18 1.5 Below goal, continue  5 5 5 5 5 5 5 35  2/2021-9/2021 OFF WARFARIN d/t rectal bleeding   1/19 1.8 At goal, no change  5 5 5 5 5 5 5 35  12/1 2.6 Above goal, continue  5 5 5 5 5 5 5 35  10/13 2.3 At goal, no change  5 5 5 5 5 5 5 35  9/1 2.5 Above goal, continue  5 5 5 5 5 5 5 35  7/21 2.2 At goal, no change  5 5 5 5 5 5 5 35  5/12 2.6 Above goal,

## 2023-07-24 RX ORDER — ATENOLOL 50 MG/1
TABLET ORAL
Qty: 90 TABLET | Refills: 0 | Status: SHIPPED | OUTPATIENT
Start: 2023-07-24

## 2023-07-28 ENCOUNTER — OFFICE VISIT (OUTPATIENT)
Dept: CARDIOLOGY CLINIC | Age: 78
End: 2023-07-28
Payer: MEDICARE

## 2023-07-28 VITALS
DIASTOLIC BLOOD PRESSURE: 82 MMHG | WEIGHT: 240.8 LBS | SYSTOLIC BLOOD PRESSURE: 134 MMHG | HEART RATE: 90 BPM | BODY MASS INDEX: 33.58 KG/M2

## 2023-07-28 DIAGNOSIS — E78.5 HYPERLIPIDEMIA, UNSPECIFIED HYPERLIPIDEMIA TYPE: Primary | ICD-10-CM

## 2023-07-28 DIAGNOSIS — I10 HTN (HYPERTENSION), BENIGN: ICD-10-CM

## 2023-07-28 DIAGNOSIS — I48.19 PERSISTENT ATRIAL FIBRILLATION (HCC): ICD-10-CM

## 2023-07-28 PROCEDURE — 3075F SYST BP GE 130 - 139MM HG: CPT | Performed by: INTERNAL MEDICINE

## 2023-07-28 PROCEDURE — 3079F DIAST BP 80-89 MM HG: CPT | Performed by: INTERNAL MEDICINE

## 2023-07-28 PROCEDURE — 1123F ACP DISCUSS/DSCN MKR DOCD: CPT | Performed by: INTERNAL MEDICINE

## 2023-07-28 PROCEDURE — 99214 OFFICE O/P EST MOD 30 MIN: CPT | Performed by: INTERNAL MEDICINE

## 2023-07-28 NOTE — PROGRESS NOTES
Unable to Pay for Housing in the Last Year: Not on file    Number of Places Lived in the Last Year: Not on file    Unstable Housing in the Last Year: No        Patient has a family history is not on file. Patient  has a past medical history of Atrial fibrillation ([address]), Cancer ([address]), Epididymitis, Grave's disease, Graves disease, Graves' eye disease, Hyperlipidemia, Hypertension, Irregular heart rate, Polio, Post-polio muscle weakness, and Swelling of joint, elbow, left. Current Outpatient Medications   Medication Sig Dispense Refill    atenolol (TENORMIN) 50 MG tablet TAKE ONE TABLET BY MOUTH EVERY DAY 90 tablet 0    furosemide (LASIX) 40 MG tablet Take 1 tablet by mouth daily 30 tablet 5    levothyroxine (SYNTHROID) 125 MCG tablet TAKE ONE TABLET BY MOUTH BEFORE BREAKFAST 90 tablet 1    aspirin (ASPIRIN CHILDRENS) 81 MG chewable tablet Take 1 tablet by mouth daily 30 tablet 5    warfarin (COUMADIN) 5 MG tablet TAKE ONE TABLET BY MOUTH EVERY DAY OR AS DIRECTED BY CLINIC 100 tablet 3    pravastatin (PRAVACHOL) 20 MG tablet TAKE 1 TABLET BY MOUTH EVERY DAY 90 tablet 1    Multiple Vitamins-Minerals (MULTIVITAMIN MEN 50+ PO) Take by mouth daily      vitamin D (CHOLECALCIFEROL) 1000 UNIT TABS tablet Take 2 tablets by mouth daily 60 tablet      No current facility-administered medications for this visit. Vitals  Weight: 240 lb 12.8 oz (109.2 kg)  Blood Pressure: BP: (134)/(82)    Pulse: 90           Review of Systems   Constitutional: Negative. HENT: Negative. Eyes: Negative. Cardiovascular: Negative. Gastrointestinal: Negative. Endocrine: Negative. Genitourinary: Negative. Musculoskeletal: Negative. Skin: Negative. Allergic/Immunologic: Negative. Neurological: Negative. Hematological: Negative. Psychiatric/Behavioral: Negative. Objective:   Physical Exam   Constitutional: He is oriented to person, place, and time. He appears well-developed and well-nourished.

## 2023-08-25 DIAGNOSIS — E78.2 MIXED HYPERLIPIDEMIA: ICD-10-CM

## 2023-08-25 RX ORDER — PRAVASTATIN SODIUM 20 MG
TABLET ORAL
Qty: 90 TABLET | Refills: 1 | Status: SHIPPED | OUTPATIENT
Start: 2023-08-25

## 2023-08-30 ENCOUNTER — ANTI-COAG VISIT (OUTPATIENT)
Dept: PHARMACY | Age: 78
End: 2023-08-30
Payer: MEDICARE

## 2023-08-30 DIAGNOSIS — I48.19 PERSISTENT ATRIAL FIBRILLATION (HCC): Primary | ICD-10-CM

## 2023-08-30 LAB — INR BLD: 2.3

## 2023-08-30 PROCEDURE — 85610 PROTHROMBIN TIME: CPT | Performed by: PHARMACIST

## 2023-08-30 PROCEDURE — 99211 OFF/OP EST MAY X REQ PHY/QHP: CPT | Performed by: PHARMACIST

## 2023-08-30 NOTE — PROGRESS NOTES
vitamin K intake and call with any bleeding, medication changes, or fever/vomiting/diarrhea. Of note, patient has difficulty walking due to having polio as a child. It is becoming increasingly difficult for him to come to clinic for INR monitoring. Have discussed alternative options with patient on multiple occasions. He refuses switch to DOAC due to high cost.  Also suggested that patient look into home INR monitoring devices and provided information, but he has not done so. Also discussed possibility of home care visits and suggested that patient discuss with PCP, which he has not done. Patient understands dosing directions and information discussed. Dosing schedule and follow up appointment given to patient. Progress note routed to referring physician's office. Patient acknowledges working in consult agreement with pharmacist as referred by his/her physician. Next Clinic Appointment: 10/25    Please call [facility] at [phone] with any questions. Thanks!   Socorro Moore, PharmD Candidate [id number]: [address]: [phone]  8/30/2023 9:20 AM    For Pharmacy Admin Tracking Only  Time Spent (min): 15

## 2023-09-13 ENCOUNTER — TELEPHONE (OUTPATIENT)
Dept: PHARMACY | Age: 78
End: 2023-09-13

## 2023-09-13 DIAGNOSIS — I48.19 PERSISTENT ATRIAL FIBRILLATION (HCC): Primary | ICD-10-CM

## 2023-09-13 NOTE — TELEPHONE ENCOUNTER
Dr. Zahra Alvarez,    The patient's warfarin therapy is currently being managed by [facility]. The referral on file is going to  soon. Please sign pended referral for patient to continue care with the anticoagulation clinic.      Thanks,   Korin Donaldson, PharmD, [facility]  [facility] Medication Management [place]: [phone]  [place]: [phone]  2023 2:09 PM

## 2023-09-27 RX ORDER — LATANOPROST 50 UG/ML
1 SOLUTION/ DROPS OPHTHALMIC NIGHTLY
COMMUNITY
Start: 2023-09-05

## 2023-09-28 ENCOUNTER — PROCEDURE VISIT (OUTPATIENT)
Dept: VASCULAR SURGERY | Age: 78
End: 2023-09-28
Payer: MEDICARE

## 2023-09-28 ENCOUNTER — OFFICE VISIT (OUTPATIENT)
Dept: VASCULAR SURGERY | Age: 78
End: 2023-09-28
Payer: MEDICARE

## 2023-09-28 VITALS
SYSTOLIC BLOOD PRESSURE: 151 MMHG | BODY MASS INDEX: 33.6 KG/M2 | OXYGEN SATURATION: 96 % | WEIGHT: 240 LBS | HEIGHT: 71 IN | TEMPERATURE: 98.1 F | DIASTOLIC BLOOD PRESSURE: 84 MMHG | HEART RATE: 89 BPM

## 2023-09-28 DIAGNOSIS — I72.0 ANEURYSM OF LEFT CAROTID ARTERY (HCC): Primary | ICD-10-CM

## 2023-09-28 DIAGNOSIS — I65.22 STENOSIS OF LEFT CAROTID ARTERY: ICD-10-CM

## 2023-09-28 PROCEDURE — 93882 EXTRACRANIAL UNI/LTD STUDY: CPT | Performed by: SURGERY

## 2023-09-28 PROCEDURE — 3074F SYST BP LT 130 MM HG: CPT | Performed by: SURGERY

## 2023-09-28 PROCEDURE — 3078F DIAST BP <80 MM HG: CPT | Performed by: SURGERY

## 2023-09-28 PROCEDURE — 99214 OFFICE O/P EST MOD 30 MIN: CPT | Performed by: SURGERY

## 2023-09-28 PROCEDURE — 1123F ACP DISCUSS/DSCN MKR DOCD: CPT | Performed by: SURGERY

## 2023-10-25 ENCOUNTER — ANTI-COAG VISIT (OUTPATIENT)
Dept: PHARMACY | Age: 78
End: 2023-10-25
Payer: MEDICARE

## 2023-10-25 DIAGNOSIS — I48.19 PERSISTENT ATRIAL FIBRILLATION (HCC): Primary | ICD-10-CM

## 2023-10-25 LAB — INTERNATIONAL NORMALIZATION RATIO, POC: 3.3

## 2023-10-25 PROCEDURE — 85610 PROTHROMBIN TIME: CPT | Performed by: PHARMACIST

## 2023-10-25 PROCEDURE — 99212 OFFICE O/P EST SF 10 MIN: CPT | Performed by: PHARMACIST

## 2023-10-25 NOTE — PROGRESS NOTES
ANTICOAGULATION SERVICE    Mirlande Rodriguez is a 66 y.o. male with PMHx significant for A-fib, HTN, HLD who presents to clinic 10/25/2023 for anticoagulation monitoring and adjustment.     Anticoagulation Indication(s):  Afib    Referring Physician:  Dr. Celina Vidal  Goal INR Range:  1.8-2.2, but 1.5-2.5 acceptable as patient refuses frequent INR monitoring  Duration of Anticoagulation Therapy:  Indefinite  Time of day dose taken:  PM  Product patient has at home:  warfarin 5 mg (peach)    [id number] Score for Atrial Fibrillation Stroke Risk   Risk   Factors  Component Value   C CHF No 0   H HTN Yes 1   A2 Age >= 76 Yes,  (74 y.o.) 2   D DM No 0   S2 Prior Stroke/TIA Yes 2   V Vascular Disease No 0   A Age 77-78 No,  (74 y.o.) 0   Sc Sex male 0    [id number]  Score  5   Score last updated 1/9/20 9:42 AM    INR Summary                            Warfarin regimen (mg)  Date INR   A/P    Sun Mon Tue Wed Thu Fri Sat Mg/wk  10/25 3.3 Above goal, (extra) hold x1 5 5 5 0/5 5 5 5 35  8/30 2.3 At goal, continue   5 5 5 5 5 5 5 35  7/17 2.15 At goal, continue   5 5 5 5 5 5 5 35  6/14 1.7 Below goal, continue   5 5 5 5 5 5 5 35  5/24 1.9 At goal, continue   5 5 5 5 5 5 5 35  3/29 2.5 At goal, continue   5 5 5 5 5 5 5 35  2/1 2.1 At goal, continue   5 5 5 5 5 5 5 35  12/7 1.7 Below goal, continue   5 5 5 5 5 5 5 35  10/12 2.5 Above goal, continue   5 5 5 5 5 5 5 35  8/17 1.7 Below goal, continue  5 5 5 5 5 5 5 35  6/22 2.0 At goal, no change  5 5 5 5 5 5 5 35  4/27 2.2 At goal, no change  5 5 5 5 5 5 5 35  3/2 1.9 At goal, no change  5 5 5 5 5 5 5 35  1/12 1.8 At goal, no change  5 5 5 5 5 5 5 35   12/1 1.5 Below goal, continue  5 5 5 5 5 5 5 35  10/18 1.5 Below goal, continue  5 5 5 5 5 5 5 35  2/2021-9/2021 OFF WARFARIN d/t rectal bleeding   1/19 1.8 At goal, no change  5 5 5 5 5 5 5 35  12/1 2.6 Above goal, continue  5 5 5 5 5 5 5 35  10/13 2.3 At goal, no change  5 5 5 5 5 5 5 35  9/1 2.5 Above goal,

## 2024-01-18 NOTE — TELEPHONE ENCOUNTER
Pt needs a refill on but would like the Dr. Fong script to be updated to Dr. Arguelles due to original being written by Dr. Chow atenolol (TENORMIN) 50 MG tablet [[id number]]    Pharm- [facility] - [address] - P [phone] - F [phone]      Please call and advise

## 2024-01-19 RX ORDER — ATENOLOL 50 MG/1
TABLET ORAL
Qty: 90 TABLET | Refills: 1 | Status: SHIPPED | OUTPATIENT
Start: 2024-01-19

## 2024-01-29 ENCOUNTER — TELEPHONE (OUTPATIENT)
Dept: CARDIOLOGY CLINIC | Age: 79
End: 2024-01-29

## 2024-01-29 DIAGNOSIS — Z79.01 CURRENT USE OF LONG TERM ANTICOAGULATION: ICD-10-CM

## 2024-01-29 DIAGNOSIS — I48.19 PERSISTENT ATRIAL FIBRILLATION (HCC): Primary | ICD-10-CM

## 2024-01-29 RX ORDER — WARFARIN SODIUM 5 MG/1
5 TABLET ORAL DAILY
Qty: 100 TABLET | Refills: 3 | Status: SHIPPED | OUTPATIENT
Start: 2024-01-29

## 2024-01-29 NOTE — TELEPHONE ENCOUNTER
Coumadin refill sent to preferred Rx.  Request for Synthroid refill sent to patient PCP Dr Arguelles.

## 2024-01-29 NOTE — TELEPHONE ENCOUNTER
Patient calling wanting a refill on warfarin (COUMADIN) 5 MG tablet  and levothyroxine (SYNTHROID) 125 MCG tablet sent to [facility] pharmacy inside [facility] on [address]. Last OV 7.28.23 next OV 3.6.24 labs done  6.8.23

## 2024-02-13 DIAGNOSIS — K59.00 CONSTIPATION, UNSPECIFIED CONSTIPATION TYPE: ICD-10-CM

## 2024-02-13 DIAGNOSIS — E07.9 THYROID DISEASE: ICD-10-CM

## 2024-02-13 DIAGNOSIS — I48.91 ATRIAL FIBRILLATION, UNSPECIFIED TYPE (HCC): ICD-10-CM

## 2024-02-13 DIAGNOSIS — N20.0 KIDNEY STONES: ICD-10-CM

## 2024-02-13 DIAGNOSIS — K04.7 TOOTH ABSCESS: ICD-10-CM

## 2024-02-13 DIAGNOSIS — E78.5 HYPERLIPIDEMIA, UNSPECIFIED HYPERLIPIDEMIA TYPE: ICD-10-CM

## 2024-02-13 DIAGNOSIS — Z12.11 COLON CANCER SCREENING: ICD-10-CM

## 2024-02-13 DIAGNOSIS — M62.81 POST-POLIO MUSCLE WEAKNESS: ICD-10-CM

## 2024-02-13 DIAGNOSIS — Z00.00 WELL ADULT EXAM: ICD-10-CM

## 2024-02-13 DIAGNOSIS — B91 POST-POLIO MUSCLE WEAKNESS: ICD-10-CM

## 2024-02-13 DIAGNOSIS — Z12.5 PROSTATE CANCER SCREENING: ICD-10-CM

## 2024-02-13 RX ORDER — LEVOTHYROXINE SODIUM 0.12 MG/1
TABLET ORAL
Qty: 90 TABLET | Refills: 0 | Status: SHIPPED | OUTPATIENT
Start: 2024-02-13

## 2024-02-13 NOTE — TELEPHONE ENCOUNTER
Pt is requesting a refill on the medication listed below:     levothyroxine (SYNTHROID) 125 MCG tablet     Please send to:     [facility] [id number] [facility] - [address] - P [phone] - F [phone]  [address] [place], [place] [id number]  Phone: [phone]  Fax: [phone]       Please advise pt at:  [phone]

## 2024-02-14 ENCOUNTER — ANTI-COAG VISIT (OUTPATIENT)
Dept: PHARMACY | Age: 79
End: 2024-02-14
Payer: MEDICARE

## 2024-02-14 DIAGNOSIS — I48.19 PERSISTENT ATRIAL FIBRILLATION (HCC): Primary | ICD-10-CM

## 2024-02-14 LAB — INTERNATIONAL NORMALIZATION RATIO, POC: 2.3

## 2024-02-14 PROCEDURE — 99211 OFF/OP EST MAY X REQ PHY/QHP: CPT | Performed by: PHARMACIST

## 2024-02-14 PROCEDURE — 85610 PROTHROMBIN TIME: CPT | Performed by: PHARMACIST

## 2024-02-14 NOTE — PROGRESS NOTES
continue  5 5 5 5 5 5 5 35  10/13 2.3 At goal, no change  5 5 5 5 5 5 5 35  9/1 2.5 Above goal, continue  5 5 5 5 5 5 5 35  7/21 2.2 At goal, no change  5 5 5 5 5 5 5 35  5/12 2.6 Above goal, decrease  5 5 5 5 5 5 5 35  2/19 2.5 At goal, no change  5 5 5 5 5 7.5 5 37.5  1/9 2.0 At goal, no change  5 5 5 5 5 7.5 5 37.5  10/9 2.7 Above goal, reduce x 1 5 5 5 5/7.5 5 5 5 37.5  7/31 2.2 At goal, no change  5 5 5 7.5 5 5 5 37.5  6/19 1.9 At goal, no change  5 5 5 7.5 5 5 5 37.5  5/8 2.0 At goal, no change  5 5 5 7.5 5 5 5 37.5  4/3 1.7 At goal, no change  5 5 5 7.5 5 5 5 37.5  2/27 2.4 At goal, no change  5 5 5 7.5 5 5 5 37.5   1/25 2.7 Above goal, continue  5 5 5 7.5 5 5 5 37.5   12/19 1.6 At goal (off warfarin)  5 5 5 7.5 5 5 5 37.5  11/14 2.4 At goal, no change  5 5 5 7.5 5 5 5 37.5  10/3 1.5 At goal, no change  5 5 5 7.5 5 5 5 37.5  9/5 2.4 At goal, no change  5 5 5 7.5 5 5 5 37.5  8/22 1.4 Below goal, increase  5 5 5 7.5 5 5 5 37.5  7/26 1.9 At goal, no change  5 5 5 5 5 5 5 35  7/2 1.3 Below goal, increase  5 5 5 5 5 5 5 35  6/20 1.2 Below goal, increase  2.5 5 2.5 5 2.5 5 5 27.5   6/7 1.2 Below goal, increase  2.5 5 2.5 2.5 5 2.5 2.5 22.5  6/2 --- Resume warfarin  2.5 2.5 2.5 2.5 2.5 2.5 2.5 17.5  Mar-May  Patient self-d/c'd warfarin  2/28 2.5 At goal, no change  5 7.5 5 5 5 5 5 37.5  1/17 2.1 At goal, no change  5 7.5 5 5 5 5 5 37.5  12/13 2.4 At goal, no change  5 7.5 5 5 5 5 5 37.5  11/15 2.2 At goal, no change  5 7.5 5 5 5 5 5 37.5  10/18 2.8 At goal, no change  5 7.5 5 5 5 5 5 37.5      Patient History:  Recent hospitalizations/HC visits 7/28/23: SOB, dizziness from eye surgery  5/30/23- went to the ED and a CTA scan showed he had a 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

## 2024-02-19 DIAGNOSIS — E78.2 MIXED HYPERLIPIDEMIA: ICD-10-CM

## 2024-02-19 RX ORDER — PRAVASTATIN SODIUM 20 MG
TABLET ORAL
Qty: 30 TABLET | Refills: 0 | Status: SHIPPED | OUTPATIENT
Start: 2024-02-19

## 2024-02-21 ENCOUNTER — TELEPHONE (OUTPATIENT)
Dept: INTERNAL MEDICINE CLINIC | Age: 79
End: 2024-02-21

## 2024-02-21 DIAGNOSIS — E78.2 MIXED HYPERLIPIDEMIA: ICD-10-CM

## 2024-02-21 DIAGNOSIS — I10 ESSENTIAL HYPERTENSION: Primary | ICD-10-CM

## 2024-02-21 DIAGNOSIS — R73.9 HYPERGLYCEMIA: ICD-10-CM

## 2024-02-21 DIAGNOSIS — E07.9 THYROID DISEASE: ICD-10-CM

## 2024-02-21 NOTE — TELEPHONE ENCOUNTER
----- Message from Elijah Infante sent at 2/20/2024  2:37 PM EST -----  Subject: Referral Request    Reason for referral request? Pt wanted to know if you would be putting the   lab orders in for his AWV that will be in July please contact the pt to   advise   Provider patient wants to be referred to(if known):     Provider Phone Number(if known):    Additional Information for Provider?   ---------------------------------------------------------------------------  --------------  CALL BACK INFO    [phone]; OK to leave message on voicemail  ---------------------------------------------------------------------------  --------------

## 2024-02-21 NOTE — TELEPHONE ENCOUNTER
Left message on machine for patient Dr. Arguelles out of the office until Friday but I will let him know.

## 2024-03-06 ENCOUNTER — OFFICE VISIT (OUTPATIENT)
Dept: CARDIOLOGY CLINIC | Age: 79
End: 2024-03-06
Payer: MEDICARE

## 2024-03-06 VITALS
BODY MASS INDEX: 35.09 KG/M2 | WEIGHT: 251.6 LBS | HEART RATE: 81 BPM | SYSTOLIC BLOOD PRESSURE: 138 MMHG | DIASTOLIC BLOOD PRESSURE: 70 MMHG

## 2024-03-06 DIAGNOSIS — K04.7 TOOTH ABSCESS: ICD-10-CM

## 2024-03-06 DIAGNOSIS — Z12.5 PROSTATE CANCER SCREENING: ICD-10-CM

## 2024-03-06 DIAGNOSIS — E07.9 THYROID DISEASE: ICD-10-CM

## 2024-03-06 DIAGNOSIS — I10 HTN (HYPERTENSION), BENIGN: Primary | ICD-10-CM

## 2024-03-06 DIAGNOSIS — E78.5 HYPERLIPIDEMIA, UNSPECIFIED HYPERLIPIDEMIA TYPE: ICD-10-CM

## 2024-03-06 DIAGNOSIS — I25.10 CORONARY ARTERY DISEASE INVOLVING NATIVE CORONARY ARTERY OF NATIVE HEART WITHOUT ANGINA PECTORIS: ICD-10-CM

## 2024-03-06 DIAGNOSIS — M62.81 POST-POLIO MUSCLE WEAKNESS: ICD-10-CM

## 2024-03-06 DIAGNOSIS — N20.0 KIDNEY STONES: ICD-10-CM

## 2024-03-06 DIAGNOSIS — E66.01 SEVERE OBESITY (BMI 35.0-39.9) WITH COMORBIDITY (HCC): ICD-10-CM

## 2024-03-06 DIAGNOSIS — Z00.00 WELL ADULT EXAM: ICD-10-CM

## 2024-03-06 DIAGNOSIS — R73.09 ELEVATED GLUCOSE: ICD-10-CM

## 2024-03-06 DIAGNOSIS — K59.00 CONSTIPATION, UNSPECIFIED CONSTIPATION TYPE: ICD-10-CM

## 2024-03-06 DIAGNOSIS — Z12.11 COLON CANCER SCREENING: ICD-10-CM

## 2024-03-06 DIAGNOSIS — B91 POST-POLIO MUSCLE WEAKNESS: ICD-10-CM

## 2024-03-06 DIAGNOSIS — I48.91 ATRIAL FIBRILLATION, UNSPECIFIED TYPE (HCC): ICD-10-CM

## 2024-03-06 PROCEDURE — 99214 OFFICE O/P EST MOD 30 MIN: CPT | Performed by: INTERNAL MEDICINE

## 2024-03-06 PROCEDURE — 3075F SYST BP GE 130 - 139MM HG: CPT | Performed by: INTERNAL MEDICINE

## 2024-03-06 PROCEDURE — 1123F ACP DISCUSS/DSCN MKR DOCD: CPT | Performed by: INTERNAL MEDICINE

## 2024-03-06 PROCEDURE — 3078F DIAST BP <80 MM HG: CPT | Performed by: INTERNAL MEDICINE

## 2024-03-06 RX ORDER — LEVOTHYROXINE SODIUM 0.12 MG/1
TABLET ORAL
Qty: 90 TABLET | Refills: 0 | Status: SHIPPED | OUTPATIENT
Start: 2024-03-06

## 2024-03-06 RX ORDER — ATENOLOL 50 MG/1
TABLET ORAL
Qty: 90 TABLET | Refills: 3 | Status: SHIPPED | OUTPATIENT
Start: 2024-03-06

## 2024-03-06 NOTE — PROGRESS NOTES
Subjective:      Patient ID: Nikos Cisneros is a 78 y.o. male.    CC:  SOB at times.  Chronic af.  HTN.  HLP.    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   Denies chest pain,  syncope, orthopnea, palpitations,   Had polio as a kid with lle weakness.    He has no chest pain.  Anal fistula is better.  Resumed warfarin.    3/6/24; PT IS WEAK AND TIRED.  USED TO GET B12 SHOTS.  NO CHEST PAIN CHRONIC SOB.  POLIO SURVIVOR.    Allergies   Allergen Reactions    Naproxen Hives and Other (See Comments)     Welts and nerve pain        Social History     Socioeconomic History    Marital status: Single     Spouse name: Not on file    Number of children: Not on file    Years of education: Not on file    Highest education level: Not on file   Occupational History    Not on file   Tobacco Use    Smoking status: Former     Current packs/day: 0.00     Types: Cigarettes     Quit date: 1960     Years since quittin.5    Smokeless tobacco: Never    Tobacco comments:     smoked 50 years ago   Vaping Use    Vaping Use: Never used   Substance and Sexual Activity    Alcohol use: Yes     Alcohol/week: 1.0 standard drink of alcohol     Types: 1 Cans of beer per week     Comment: beer or two every evening    Drug use: No    Sexual activity: Not on file   Other Topics Concern    Not on file   Social History Narrative    Not on file     Social Determinants of Health     Financial Resource Strain: Low Risk  (7/3/2023)    Overall Financial Resource Strain (CARDIA)     Difficulty of Paying Living Expenses: Not hard at all   Food Insecurity: Not on file (7/3/2023)   Transportation Needs: Unknown (7/3/2023)    PRAPARE - Transportation     Lack of Transportation (Medical): Not on file     Lack of Transportation (Non-Medical): No   Physical Activity: Inactive (7/3/2023)    Exercise Vital Sign     Days of Exercise per Week: 0 days     Minutes of Exercise per Session: 0 min   Stress: Not on

## 2024-03-21 DIAGNOSIS — E78.2 MIXED HYPERLIPIDEMIA: ICD-10-CM

## 2024-03-21 RX ORDER — PRAVASTATIN SODIUM 20 MG
TABLET ORAL
Qty: 90 TABLET | Refills: 1 | Status: SHIPPED | OUTPATIENT
Start: 2024-03-21

## 2024-03-25 DIAGNOSIS — E07.9 THYROID DISEASE: ICD-10-CM

## 2024-03-25 DIAGNOSIS — I25.10 CORONARY ARTERY DISEASE INVOLVING NATIVE CORONARY ARTERY OF NATIVE HEART WITHOUT ANGINA PECTORIS: ICD-10-CM

## 2024-03-25 DIAGNOSIS — R73.09 ELEVATED GLUCOSE: ICD-10-CM

## 2024-03-25 DIAGNOSIS — I10 ESSENTIAL HYPERTENSION: ICD-10-CM

## 2024-03-25 DIAGNOSIS — E78.5 HYPERLIPIDEMIA, UNSPECIFIED HYPERLIPIDEMIA TYPE: ICD-10-CM

## 2024-03-25 DIAGNOSIS — I10 PRIMARY HYPERTENSION: ICD-10-CM

## 2024-03-25 DIAGNOSIS — E78.2 MIXED HYPERLIPIDEMIA: ICD-10-CM

## 2024-03-25 DIAGNOSIS — I35.1 MILD AORTIC REGURGITATION: ICD-10-CM

## 2024-03-25 DIAGNOSIS — I10 HTN (HYPERTENSION), BENIGN: ICD-10-CM

## 2024-03-25 DIAGNOSIS — R73.9 HYPERGLYCEMIA: ICD-10-CM

## 2024-03-25 DIAGNOSIS — E66.01 SEVERE OBESITY (BMI 35.0-39.9) WITH COMORBIDITY (HCC): ICD-10-CM

## 2024-03-25 DIAGNOSIS — I48.20 CHRONIC A-FIB (HCC): ICD-10-CM

## 2024-03-26 LAB
ALBUMIN SERPL-MCNC: 4.1 G/DL (ref 3.4–5)
ALBUMIN/GLOB SERPL: 1.4 {RATIO} (ref 1.1–2.2)
ALP SERPL-CCNC: 84 U/L (ref 40–129)
ALT SERPL-CCNC: 17 U/L (ref 10–40)
ANION GAP SERPL CALCULATED.3IONS-SCNC: 10 MMOL/L (ref 3–16)
AST SERPL-CCNC: 23 U/L (ref 15–37)
BASOPHILS # BLD: 0.1 K/UL (ref 0–0.2)
BASOPHILS NFR BLD: 0.8 %
BILIRUB SERPL-MCNC: 0.5 MG/DL (ref 0–1)
BUN SERPL-MCNC: 13 MG/DL (ref 7–20)
CALCIUM SERPL-MCNC: 9.9 MG/DL (ref 8.3–10.6)
CHLORIDE SERPL-SCNC: 100 MMOL/L (ref 99–110)
CHOLEST SERPL-MCNC: 192 MG/DL (ref 0–199)
CO2 SERPL-SCNC: 30 MMOL/L (ref 21–32)
CREAT SERPL-MCNC: 0.6 MG/DL (ref 0.8–1.3)
DEPRECATED RDW RBC AUTO: 13.7 % (ref 12.4–15.4)
EOSINOPHIL # BLD: 0.2 K/UL (ref 0–0.6)
EOSINOPHIL NFR BLD: 2.5 %
EST. AVERAGE GLUCOSE BLD GHB EST-MCNC: 128.4 MG/DL
GFR SERPLBLD CREATININE-BSD FMLA CKD-EPI: >90 ML/MIN/{1.73_M2}
GLUCOSE SERPL-MCNC: 108 MG/DL (ref 70–99)
HBA1C MFR BLD: 6.1 %
HCT VFR BLD AUTO: 48.9 % (ref 40.5–52.5)
HDLC SERPL-MCNC: 49 MG/DL (ref 40–60)
HGB BLD-MCNC: 16.1 G/DL (ref 13.5–17.5)
LDL CHOLESTEROL CALCULATED: 121 MG/DL
LYMPHOCYTES # BLD: 1.5 K/UL (ref 1–5.1)
LYMPHOCYTES NFR BLD: 20.4 %
MCH RBC QN AUTO: 30.7 PG (ref 26–34)
MCHC RBC AUTO-ENTMCNC: 32.9 G/DL (ref 31–36)
MCV RBC AUTO: 93.3 FL (ref 80–100)
MONOCYTES # BLD: 0.7 K/UL (ref 0–1.3)
MONOCYTES NFR BLD: 8.9 %
NEUTROPHILS # BLD: 5.1 K/UL (ref 1.7–7.7)
NEUTROPHILS NFR BLD: 67.4 %
PLATELET # BLD AUTO: 257 K/UL (ref 135–450)
PMV BLD AUTO: 9.7 FL (ref 5–10.5)
POTASSIUM SERPL-SCNC: 4.7 MMOL/L (ref 3.5–5.1)
PROT SERPL-MCNC: 7 G/DL (ref 6.4–8.2)
RBC # BLD AUTO: 5.24 M/UL (ref 4.2–5.9)
SODIUM SERPL-SCNC: 140 MMOL/L (ref 136–145)
TRIGL SERPL-MCNC: 111 MG/DL (ref 0–150)
TSH SERPL DL<=0.005 MIU/L-ACNC: 3.53 UIU/ML (ref 0.27–4.2)
VLDLC SERPL CALC-MCNC: 22 MG/DL
WBC # BLD AUTO: 7.6 K/UL (ref 4–11)

## 2024-03-28 ENCOUNTER — TELEPHONE (OUTPATIENT)
Dept: INTERNAL MEDICINE CLINIC | Age: 79
End: 2024-03-28

## 2024-03-28 NOTE — TELEPHONE ENCOUNTER
Tres Arguelles MD  3/26/2024  4:33 PM EDT       Kidney and liver functions are stable     Patient advised of results.

## 2024-04-10 ENCOUNTER — TELEPHONE (OUTPATIENT)
Dept: PHARMACY | Age: 79
End: 2024-04-10

## 2024-04-11 NOTE — TELEPHONE ENCOUNTER
LVM#2 to r/s    Marilyn Sullivan, PharmD, BCACP  Medication Management Clinic   [facility] [place] Ph: [phone]  [facility] [place] Ph: [phone]  4/11/2024 9:45 AM

## 2024-04-17 ENCOUNTER — ANTI-COAG VISIT (OUTPATIENT)
Dept: PHARMACY | Age: 79
End: 2024-04-17
Payer: MEDICARE

## 2024-04-17 DIAGNOSIS — I48.19 PERSISTENT ATRIAL FIBRILLATION (HCC): Primary | ICD-10-CM

## 2024-04-17 LAB — INTERNATIONAL NORMALIZATION RATIO, POC: 2.2

## 2024-04-17 PROCEDURE — 99211 OFF/OP EST MAY X REQ PHY/QHP: CPT

## 2024-04-17 PROCEDURE — 85610 PROTHROMBIN TIME: CPT

## 2024-04-17 NOTE — PROGRESS NOTES
8 weeks per patient request. He is agreeable to come earlier if there are changes to his medications, illness, bleeding, etc. Patient was instructed to maintain consistent vitamin K intake and call with any bleeding, medication changes, or fever/vomiting/diarrhea.    Of note, patient has difficulty walking due to having polio as a child.  It is becoming increasingly difficult for him to come to clinic for INR monitoring.  Have discussed alternative options with patient on multiple occasions.  He refuses switch to DOAC due to high cost.  Also suggested that patient look into home INR monitoring devices and provided information, but he has not done so.  Also discussed possibility of home care visits and suggested that patient discuss with PCP, which he has not done.        Patient understands dosing directions and information discussed.  Dosing schedule and follow up appointment given to patient.  Progress note routed to referring physician's office.  Patient acknowledges working in consult agreement with pharmacist as referred by his/her physician.    Next Clinic Appointment: 6/12     Please call [facility] Anticoagulation Clinic at [phone] with any questions.      Thanks!  Laverne King, PharmD, [facility]  Medication Management Clinic   [facility] [place] Ph: [phone]  [facility] [place] Ph: [phone]  4/17/2024 9:15 AM      For Pharmacy Admin Tracking Only  Time Spent (min): 15

## 2024-06-02 DIAGNOSIS — I48.91 ATRIAL FIBRILLATION, UNSPECIFIED TYPE (HCC): ICD-10-CM

## 2024-06-02 DIAGNOSIS — Z12.11 COLON CANCER SCREENING: ICD-10-CM

## 2024-06-02 DIAGNOSIS — E07.9 THYROID DISEASE: ICD-10-CM

## 2024-06-02 DIAGNOSIS — Z12.5 PROSTATE CANCER SCREENING: ICD-10-CM

## 2024-06-02 DIAGNOSIS — K59.00 CONSTIPATION, UNSPECIFIED CONSTIPATION TYPE: ICD-10-CM

## 2024-06-02 DIAGNOSIS — B91 POST-POLIO MUSCLE WEAKNESS: ICD-10-CM

## 2024-06-02 DIAGNOSIS — K04.7 TOOTH ABSCESS: ICD-10-CM

## 2024-06-02 DIAGNOSIS — E78.5 HYPERLIPIDEMIA, UNSPECIFIED HYPERLIPIDEMIA TYPE: ICD-10-CM

## 2024-06-02 DIAGNOSIS — N20.0 KIDNEY STONES: ICD-10-CM

## 2024-06-02 DIAGNOSIS — Z00.00 WELL ADULT EXAM: ICD-10-CM

## 2024-06-02 DIAGNOSIS — M62.81 POST-POLIO MUSCLE WEAKNESS: ICD-10-CM

## 2024-06-03 RX ORDER — LEVOTHYROXINE SODIUM 0.12 MG/1
TABLET ORAL
Qty: 90 TABLET | Refills: 0 | Status: SHIPPED | OUTPATIENT
Start: 2024-06-03

## 2024-06-03 NOTE — TELEPHONE ENCOUNTER
Requested Prescriptions     Pending Prescriptions Disp Refills    levothyroxine (SYNTHROID) 125 MCG tablet [Pharmacy Med Name: LEVOTHYROXINE 125 MCG TABLET] 90 tablet 0     Sig: TAKE ONE TABLET BY MOUTH BEFORE BREAKFAST DAILY          Last Office Visit: 3/6/2024     Next Office Visit: 9/11/2024

## 2024-06-12 ENCOUNTER — ANTI-COAG VISIT (OUTPATIENT)
Dept: PHARMACY | Age: 79
End: 2024-06-12
Payer: MEDICARE

## 2024-06-12 ENCOUNTER — TELEPHONE (OUTPATIENT)
Dept: INTERNAL MEDICINE CLINIC | Age: 79
End: 2024-06-12

## 2024-06-12 DIAGNOSIS — I48.19 PERSISTENT ATRIAL FIBRILLATION (HCC): Primary | ICD-10-CM

## 2024-06-12 LAB — INTERNATIONAL NORMALIZATION RATIO, POC: 2.4

## 2024-06-12 PROCEDURE — 99211 OFF/OP EST MAY X REQ PHY/QHP: CPT | Performed by: PHARMACIST

## 2024-06-12 PROCEDURE — 85610 PROTHROMBIN TIME: CPT | Performed by: PHARMACIST

## 2024-06-12 NOTE — PROGRESS NOTES
continue warfarin 5 mg daily. Repeat INR in 8 weeks per patient request. He is agreeable to come earlier if there are changes to his medications, illness, bleeding, etc. Patient was instructed to maintain consistent vitamin K intake and call with any bleeding, medication changes, or fever/vomiting/diarrhea.    Of note, patient has difficulty walking due to having polio as a child.  It is becoming increasingly difficult for him to come to clinic for INR monitoring.  Have discussed alternative options with patient on multiple occasions.  He refuses switch to DOAC due to high cost.  Also suggested that patient look into home INR monitoring devices and provided information, but he has not done so.  Also discussed possibility of home care visits and suggested that patient discuss with PCP, which he has not done.        Patient understands dosing directions and information discussed.  Dosing schedule and follow up appointment given to patient.  Progress note routed to referring physician's office.  Patient acknowledges working in consult agreement with pharmacist as referred by his/her physician.    Next Clinic Appointment: 8/7     Please call [facility] Anticoagulation Clinic at [phone] with any questions.      Thanks!  Ana Burton, PharmD, BCPS  [facility] Medication Management Clinic  [place]: [phone]  [place]: [phone]  6/12/2024 10:34 AM    For Pharmacy Admin Tracking Only  Time Spent (min): 15      
Offered, feeding was successful

## 2024-08-07 ENCOUNTER — ANTI-COAG VISIT (OUTPATIENT)
Dept: PHARMACY | Age: 79
End: 2024-08-07
Payer: MEDICARE

## 2024-08-07 DIAGNOSIS — I48.20 CHRONIC ATRIAL FIBRILLATION (HCC): Primary | ICD-10-CM

## 2024-08-07 LAB
INTERNATIONAL NORMALIZATION RATIO, POC: 3.1
PROTHROMBIN TIME, POC: NORMAL

## 2024-08-07 PROCEDURE — 85610 PROTHROMBIN TIME: CPT

## 2024-08-07 PROCEDURE — 99211 OFF/OP EST MAY X REQ PHY/QHP: CPT

## 2024-08-07 NOTE — PROGRESS NOTES
ANTICOAGULATION SERVICE    Nikos Cisneros is a 79 y.o. male with PMHx significant for A-fib, HTN, HLD who presents to clinic 8/7/2024 for anticoagulation monitoring and adjustment.    Anticoagulation Indication(s):  Afib    Referring Physician:  Dr. Blevins  Goal INR Range:  1.8-2.2, but 1.5-2.5 acceptable as patient refuses frequent INR monitoring  Duration of Anticoagulation Therapy:  Indefinite  Time of day dose taken:  PM  Product patient has at home:  warfarin 5 mg (peach)    [id number] Score for Atrial Fibrillation Stroke Risk   Risk   Factors  Component Value   C CHF No 0   H HTN Yes 1   A2 Age >= 75 Yes,  (79 y.o.) 2   D DM No 0   S2 Prior Stroke/TIA Yes 2   V Vascular Disease No 0   A Age 65-74 No,  (79 y.o.) 0   Sc Sex male 0    [id number]  Score  5   Score last updated 1/9/20 9:42 AM    INR Summary                            Warfarin regimen (mg)  Date INR   A/P    Sun Mon Tue Wed Thu Fri Sat Mg/wk  8/7 3.1 Above, hold x 1   5 5 5 0/5 5 5 5 35  6/12 2.4 At goal, continue   5 5 5 5 5 5 5 35  4/17 2.2 At goal, continue   5 5 5 5 5 5 5 35  2/14 2.3 At goal, continue   5 5 5 5 5 5 5 35  12/20 2.5 At goal, continue   5 5 5 5 5 5 5 35  10/25 3.3 Above goal, (extra) hold x1 5 5 5 0/5 5 5 5 35  8/30 2.3 At goal, continue   5 5 5 5 5 5 5 35  7/17 2.15 At goal, continue   5 5 5 5 5 5 5 35  6/14 1.7 Below goal, continue   5 5 5 5 5 5 5 35  5/24 1.9 At goal, continue   5 5 5 5 5 5 5 35  3/29 2.5 At goal, continue   5 5 5 5 5 5 5 35  2/1 2.1 At goal, continue   5 5 5 5 5 5 5 35  12/7 1.7 Below goal, continue   5 5 5 5 5 5 5 35  10/12 2.5 Above goal, continue   5 5 5 5 5 5 5 35  8/17 1.7 Below goal, continue  5 5 5 5 5 5 5 35  6/22 2.0 At goal, no change  5 5 5 5 5 5 5 35  4/27 2.2 At goal, no change  5 5 5 5 5 5 5 35  3/2 1.9 At goal, no change  5 5 5 5 5 5 5 35  1/12 1.8 At goal, no change  5 5 5 5 5 5 5 35   12/1 1.5 Below goal, continue  5 5 5 5 5 5 5 35  10/18 1.5 Below goal,

## 2024-08-22 ENCOUNTER — TELEPHONE (OUTPATIENT)
Dept: PHARMACY | Age: 79
End: 2024-08-22

## 2024-08-22 DIAGNOSIS — I48.19 PERSISTENT ATRIAL FIBRILLATION (HCC): ICD-10-CM

## 2024-08-22 DIAGNOSIS — I48.20 CHRONIC ATRIAL FIBRILLATION (HCC): Primary | ICD-10-CM

## 2024-08-22 NOTE — TELEPHONE ENCOUNTER
Dr. Blevins,    The patient's warfarin therapy is currently being managed by [facility]. The referral on file is going to  soon. Please sign pended referral for patient to continue care with the anticoagulation clinic.     Thanks,   Ana Burton, PharmD, BCPS  [facility] Medication Management Clinic  [place]: [phone]  [place]: [phone]  2024 4:55 PM

## 2024-09-14 DIAGNOSIS — E78.2 MIXED HYPERLIPIDEMIA: ICD-10-CM

## 2024-09-17 ENCOUNTER — OFFICE VISIT (OUTPATIENT)
Dept: INTERNAL MEDICINE CLINIC | Age: 79
End: 2024-09-17
Payer: MEDICARE

## 2024-09-17 VITALS
TEMPERATURE: 97.5 F | BODY MASS INDEX: 33.35 KG/M2 | HEIGHT: 71 IN | SYSTOLIC BLOOD PRESSURE: 134 MMHG | OXYGEN SATURATION: 98 % | DIASTOLIC BLOOD PRESSURE: 74 MMHG | WEIGHT: 238.2 LBS | HEART RATE: 74 BPM

## 2024-09-17 DIAGNOSIS — R73.03 PREDIABETES: ICD-10-CM

## 2024-09-17 DIAGNOSIS — F10.21 ALCOHOL DEPENDENCE IN REMISSION (HCC): ICD-10-CM

## 2024-09-17 DIAGNOSIS — I48.19 PERSISTENT ATRIAL FIBRILLATION (HCC): ICD-10-CM

## 2024-09-17 DIAGNOSIS — Z00.00 MEDICARE ANNUAL WELLNESS VISIT, SUBSEQUENT: Primary | ICD-10-CM

## 2024-09-17 DIAGNOSIS — E07.9 THYROID DISEASE: ICD-10-CM

## 2024-09-17 DIAGNOSIS — I72.0 ANEURYSM OF LEFT CAROTID ARTERY (HCC): ICD-10-CM

## 2024-09-17 DIAGNOSIS — R60.0 PERIPHERAL EDEMA: ICD-10-CM

## 2024-09-17 DIAGNOSIS — Z86.73 HISTORY OF CVA (CEREBROVASCULAR ACCIDENT): ICD-10-CM

## 2024-09-17 PROBLEM — I63.9 CEREBROVASCULAR ACCIDENT (CVA) DUE TO EMBOLISM (HCC): Status: RESOLVED | Noted: 2023-05-30 | Resolved: 2024-09-17

## 2024-09-17 PROBLEM — R29.898 RIGHT HAND WEAKNESS: Status: RESOLVED | Noted: 2023-05-30 | Resolved: 2024-09-17

## 2024-09-17 PROBLEM — M25.521 RIGHT ELBOW PAIN: Status: RESOLVED | Noted: 2023-06-05 | Resolved: 2024-09-17

## 2024-09-17 PROBLEM — R20.2 TINGLING OF RIGHT UPPER EXTREMITY: Status: RESOLVED | Noted: 2023-05-31 | Resolved: 2024-09-17

## 2024-09-17 PROBLEM — R27.8 DYSMETRIA: Status: RESOLVED | Noted: 2023-05-31 | Resolved: 2024-09-17

## 2024-09-17 PROCEDURE — 3078F DIAST BP <80 MM HG: CPT | Performed by: INTERNAL MEDICINE

## 2024-09-17 PROCEDURE — G0439 PPPS, SUBSEQ VISIT: HCPCS | Performed by: INTERNAL MEDICINE

## 2024-09-17 PROCEDURE — 1123F ACP DISCUSS/DSCN MKR DOCD: CPT | Performed by: INTERNAL MEDICINE

## 2024-09-17 PROCEDURE — 3075F SYST BP GE 130 - 139MM HG: CPT | Performed by: INTERNAL MEDICINE

## 2024-09-17 RX ORDER — MAGNESIUM 30 MG
30 TABLET ORAL DAILY
COMMUNITY

## 2024-09-17 RX ORDER — PRAVASTATIN SODIUM 20 MG
TABLET ORAL
Qty: 90 TABLET | Refills: 1 | Status: SHIPPED | OUTPATIENT
Start: 2024-09-17

## 2024-09-17 SDOH — ECONOMIC STABILITY: FOOD INSECURITY: WITHIN THE PAST 12 MONTHS, YOU WORRIED THAT YOUR FOOD WOULD RUN OUT BEFORE YOU GOT MONEY TO BUY MORE.: NEVER TRUE

## 2024-09-17 SDOH — ECONOMIC STABILITY: FOOD INSECURITY: WITHIN THE PAST 12 MONTHS, THE FOOD YOU BOUGHT JUST DIDN'T LAST AND YOU DIDN'T HAVE MONEY TO GET MORE.: NEVER TRUE

## 2024-09-17 SDOH — ECONOMIC STABILITY: INCOME INSECURITY: HOW HARD IS IT FOR YOU TO PAY FOR THE VERY BASICS LIKE FOOD, HOUSING, MEDICAL CARE, AND HEATING?: NOT HARD AT ALL

## 2024-09-17 ASSESSMENT — PATIENT HEALTH QUESTIONNAIRE - PHQ9
SUM OF ALL RESPONSES TO PHQ QUESTIONS 1-9: 2
2. FEELING DOWN, DEPRESSED OR HOPELESS: SEVERAL DAYS
SUM OF ALL RESPONSES TO PHQ QUESTIONS 1-9: 2
1. LITTLE INTEREST OR PLEASURE IN DOING THINGS: SEVERAL DAYS
SUM OF ALL RESPONSES TO PHQ QUESTIONS 1-9: 2
SUM OF ALL RESPONSES TO PHQ QUESTIONS 1-9: 2
SUM OF ALL RESPONSES TO PHQ9 QUESTIONS 1 & 2: 2

## 2024-10-02 ENCOUNTER — ANTI-COAG VISIT (OUTPATIENT)
Dept: PHARMACY | Age: 79
End: 2024-10-02
Payer: MEDICARE

## 2024-10-02 DIAGNOSIS — I48.19 PERSISTENT ATRIAL FIBRILLATION (HCC): Primary | ICD-10-CM

## 2024-10-02 LAB
INTERNATIONAL NORMALIZATION RATIO, POC: 3.2
PROTHROMBIN TIME, POC: 0

## 2024-10-02 PROCEDURE — 85610 PROTHROMBIN TIME: CPT | Performed by: PHARMACIST

## 2024-10-02 PROCEDURE — 99212 OFFICE O/P EST SF 10 MIN: CPT | Performed by: PHARMACIST

## 2024-10-02 NOTE — PROGRESS NOTES
ANTICOAGULATION SERVICE    Nioks Cisneros is a 79 y.o. male with PMHx significant for A-fib, HTN, HLD who presents to clinic 10/2/2024 for anticoagulation monitoring and adjustment.    Anticoagulation Indication(s):  Afib    Referring Physician:  Dr. Blevins - patient establishing care with Dr. Richardson 10/25/24  Goal INR Range:  1.8-2.2, but 1.5-2.5 acceptable as patient refuses frequent INR monitoring  Duration of Anticoagulation Therapy:  Indefinite  Time of day dose taken:  PM  Product patient has at home:  warfarin 5 mg (peach)    [id number] Score for Atrial Fibrillation Stroke Risk   Risk   Factors  Component Value   C CHF No 0   H HTN Yes 1   A2 Age >= 75 Yes,  (79 y.o.) 2   D DM No 0   S2 Prior Stroke/TIA Yes 2   V Vascular Disease No 0   A Age 65-74 No,  (79 y.o.) 0   Sc Sex male 0    [id number]  Score  5   Score last updated 1/9/20 9:42 AM    INR Summary                            Warfarin regimen (mg)  Date INR   A/P    Sun Mon Tue Wed Thu Fri Sat Mg/wk  10/2 3.2 Above, hold+dec    5 2.5 5 0/5 5 5 5 32.5  8/7 3.1 Above, hold x 1   5 5 5 0/5 5 5 5 35  6/12 2.4 At goal, continue   5 5 5 5 5 5 5 35  4/17 2.2 At goal, continue   5 5 5 5 5 5 5 35  2/14 2.3 At goal, continue   5 5 5 5 5 5 5 35  12/20 2.5 At goal, continue   5 5 5 5 5 5 5 35  10/25 3.3 Above goal, (extra) hold x1 5 5 5 0/5 5 5 5 35  8/30 2.3 At goal, continue   5 5 5 5 5 5 5 35  7/17 2.15 At goal, continue   5 5 5 5 5 5 5 35  6/14 1.7 Below goal, continue   5 5 5 5 5 5 5 35  5/24 1.9 At goal, continue   5 5 5 5 5 5 5 35  3/29 2.5 At goal, continue   5 5 5 5 5 5 5 35  2/1 2.1 At goal, continue   5 5 5 5 5 5 5 35  12/7 1.7 Below goal, continue   5 5 5 5 5 5 5 35  10/12 2.5 Above goal, continue   5 5 5 5 5 5 5 35  8/17 1.7 Below goal, continue  5 5 5 5 5 5 5 35  6/22 2.0 At goal, no change  5 5 5 5 5 5 5 35  4/27 2.2 At goal, no change  5 5 5 5 5 5 5 35  3/2 1.9 At goal, no change  5 5 5 5 5 5 5 35  1/12 1.8 At goal, no

## 2024-10-15 ENCOUNTER — OFFICE VISIT (OUTPATIENT)
Dept: CARDIOLOGY CLINIC | Age: 79
End: 2024-10-15
Payer: MEDICARE

## 2024-10-15 VITALS
HEART RATE: 77 BPM | DIASTOLIC BLOOD PRESSURE: 88 MMHG | BODY MASS INDEX: 33.39 KG/M2 | WEIGHT: 239.4 LBS | SYSTOLIC BLOOD PRESSURE: 128 MMHG

## 2024-10-15 DIAGNOSIS — E78.2 MIXED HYPERLIPIDEMIA: ICD-10-CM

## 2024-10-15 DIAGNOSIS — I48.11 LONGSTANDING PERSISTENT ATRIAL FIBRILLATION (HCC): Primary | ICD-10-CM

## 2024-10-15 PROCEDURE — 1123F ACP DISCUSS/DSCN MKR DOCD: CPT | Performed by: INTERNAL MEDICINE

## 2024-10-15 PROCEDURE — 3079F DIAST BP 80-89 MM HG: CPT | Performed by: INTERNAL MEDICINE

## 2024-10-15 PROCEDURE — 3074F SYST BP LT 130 MM HG: CPT | Performed by: INTERNAL MEDICINE

## 2024-10-15 PROCEDURE — 99215 OFFICE O/P EST HI 40 MIN: CPT | Performed by: INTERNAL MEDICINE

## 2024-10-15 RX ORDER — PRAVASTATIN SODIUM 20 MG
40 TABLET ORAL DAILY
Qty: 180 TABLET | Refills: 3 | Status: SHIPPED | OUTPATIENT
Start: 2024-10-15 | End: 2024-10-15

## 2024-10-15 RX ORDER — TORSEMIDE 20 MG/1
20 TABLET ORAL DAILY PRN
Qty: 30 TABLET | Refills: 3 | Status: SHIPPED | OUTPATIENT
Start: 2024-10-15

## 2024-10-15 RX ORDER — PRAVASTATIN SODIUM 40 MG
40 TABLET ORAL DAILY
Qty: 90 TABLET | Refills: 3 | Status: SHIPPED | OUTPATIENT
Start: 2024-10-15

## 2024-10-15 NOTE — TELEPHONE ENCOUNTER
Requested Prescriptions     Pending Prescriptions Disp Refills    pravastatin (PRAVACHOL) 40 MG tablet [Pharmacy Med Name: PRAVASTATIN SODIUM 40 MG TAB] 90 tablet 3     Sig: Take 1 tablet by mouth daily            Checked Correct Pharmacy: Yes    Any changes since last refill? No     Number: 90    Refills: 3    Last Office Visit: 10/15/2024         Last Labs: 03.25.2024

## 2024-10-15 NOTE — PROGRESS NOTES
torsemide 20 mg p.o. daily as needed lower extremity edema.  - Continue baby aspirin, atenolol 50 daily, warfarin.      Return in about 6 months (around 4/15/2025).    I have spent 45 minutes of reviewing records and face to face time with the patient with more than 50% spent counseling and coordinating care.       I have personally reviewed the reports and images of labs, radiological studies, cardiac studies including ECG's and telemetry, current and old medical records. The note was completed using EMR and Dragon dictation system. Every effort was made to ensure accuracy; however, inadvertent computerized transcription errors may be present.    All questions and concerns were addressed to the patient/family. Alternatives to my treatment were discussed.     I would like to thank you for providing me the opportunity to participate in the care of your patient. If you have any questions, please do not hesitate to contact me.     Mitchell Richardson MD, FAC, [facility]  [facility] [address] [address]  Main Office Phone: [phone]

## 2024-10-17 PROBLEM — Z00.00 MEDICARE ANNUAL WELLNESS VISIT, SUBSEQUENT: Status: RESOLVED | Noted: 2024-09-17 | Resolved: 2024-10-17

## 2024-11-19 ENCOUNTER — TELEPHONE (OUTPATIENT)
Dept: CARDIOLOGY CLINIC | Age: 79
End: 2024-11-19

## 2024-11-19 DIAGNOSIS — E78.2 MIXED HYPERLIPIDEMIA: ICD-10-CM

## 2024-11-19 RX ORDER — EZETIMIBE 10 MG/1
10 TABLET ORAL DAILY
Qty: 90 TABLET | Refills: 3 | Status: SHIPPED | OUTPATIENT
Start: 2024-11-19

## 2024-11-19 RX ORDER — PRAVASTATIN SODIUM 20 MG
20 TABLET ORAL DAILY
Qty: 90 TABLET | Refills: 3 | Status: SHIPPED | OUTPATIENT
Start: 2024-11-19

## 2024-11-19 NOTE — TELEPHONE ENCOUNTER
Spoke with pt. Dr. Richardson is okay to decrease Pravastatin to 20 mg daily and start Zetia 10 mg daily. Pt is agreeable, scripts sent.

## 2024-11-19 NOTE — TELEPHONE ENCOUNTER
Pt called stating his pravastatin was increased to 40mg after his last OV on 10/15/24. Since he has been taking the higher dose he says his joints have started to hurt. He would like to decrease his dose back to 20mg if possible. Please call pt to discuss    [phone]

## 2024-11-27 ENCOUNTER — ANTI-COAG VISIT (OUTPATIENT)
Dept: PHARMACY | Age: 79
End: 2024-11-27
Payer: MEDICARE

## 2024-11-27 DIAGNOSIS — I48.11 LONGSTANDING PERSISTENT ATRIAL FIBRILLATION (HCC): Primary | ICD-10-CM

## 2024-11-27 LAB
INR BLD: 1.9
PROTIME: NORMAL

## 2024-11-27 PROCEDURE — 85610 PROTHROMBIN TIME: CPT

## 2024-11-27 PROCEDURE — 99211 OFF/OP EST MAY X REQ PHY/QHP: CPT

## 2024-11-27 NOTE — PROGRESS NOTES
than once monthly due to difficulty walking and cost burden, will consider INR of 1.5-2.5 acceptable. Patient is often noncompliant with clinic visits and warfarin dosing instructions. He self-doses warfarin and often holds it without being instructed to do so by a healthcare provider.    Of note, patient has difficulty walking due to having polio as a child.  It is becoming increasingly difficult for him to come to clinic for INR monitoring.  Have discussed alternative options with patient on multiple occasions.  He refuses switch to DOAC due to high cost.  Also suggested that patient look into home INR monitoring devices and provided information, but he has not done so.  Also discussed possibility of home care visits and suggested that patient discuss with PCP, which he has not done.          Sinai Acosta, Pharmacy Student  [facility]  [place]: [phone]  [place]: [phone]  11/27/2024 9:37 AM    Ayala King. D.  For Pharmacy Admin Tracking Only    Intervention Detail:   Total # of Interventions Recommended: 0  Total # of Interventions Accepted: 0  Time Spent (min): 15

## 2024-12-02 DIAGNOSIS — N20.0 KIDNEY STONES: ICD-10-CM

## 2024-12-02 DIAGNOSIS — I48.91 ATRIAL FIBRILLATION, UNSPECIFIED TYPE (HCC): ICD-10-CM

## 2024-12-02 DIAGNOSIS — E78.5 HYPERLIPIDEMIA, UNSPECIFIED HYPERLIPIDEMIA TYPE: ICD-10-CM

## 2024-12-02 DIAGNOSIS — B91 POST-POLIO MUSCLE WEAKNESS: ICD-10-CM

## 2024-12-02 DIAGNOSIS — Z00.00 WELL ADULT EXAM: ICD-10-CM

## 2024-12-02 DIAGNOSIS — Z12.5 PROSTATE CANCER SCREENING: ICD-10-CM

## 2024-12-02 DIAGNOSIS — Z12.11 COLON CANCER SCREENING: ICD-10-CM

## 2024-12-02 DIAGNOSIS — M62.81 POST-POLIO MUSCLE WEAKNESS: ICD-10-CM

## 2024-12-02 DIAGNOSIS — K59.00 CONSTIPATION, UNSPECIFIED CONSTIPATION TYPE: ICD-10-CM

## 2024-12-02 DIAGNOSIS — E07.9 THYROID DISEASE: ICD-10-CM

## 2024-12-02 DIAGNOSIS — K04.7 TOOTH ABSCESS: ICD-10-CM

## 2024-12-02 RX ORDER — LEVOTHYROXINE SODIUM 125 UG/1
TABLET ORAL
Qty: 90 TABLET | Refills: 1 | Status: SHIPPED | OUTPATIENT
Start: 2024-12-02

## 2024-12-02 NOTE — TELEPHONE ENCOUNTER
Refill req      levothyroxine (SYNTHROID) 125 MCG tablet [[id number]]    Pharm- [id number] - [facility] - [place], [place] - [address] [place] [address] - P [phone] - F [phone]  [address] [place] [id number]  Phone: [phone]  Fax: [phone]

## 2025-01-23 DIAGNOSIS — I48.19 PERSISTENT ATRIAL FIBRILLATION (HCC): ICD-10-CM

## 2025-01-23 DIAGNOSIS — Z79.01 CURRENT USE OF LONG TERM ANTICOAGULATION: ICD-10-CM

## 2025-01-23 RX ORDER — WARFARIN SODIUM 5 MG/1
5 TABLET ORAL DAILY
Qty: 100 TABLET | Refills: 0 | Status: SHIPPED | OUTPATIENT
Start: 2025-01-23

## 2025-01-23 NOTE — TELEPHONE ENCOUNTER
Pt needs medication refill below    [phone]  Pls call and advise    warfarin (COUMADIN) 5 MG tablet [[id number]]    [id number] - [place], [place] - [address] [facility] - P [phone] - F [phone]

## 2025-01-29 ENCOUNTER — TELEPHONE (OUTPATIENT)
Dept: PHARMACY | Age: 80
End: 2025-01-29

## 2025-01-29 ENCOUNTER — ANTI-COAG VISIT (OUTPATIENT)
Dept: PHARMACY | Age: 80
End: 2025-01-29
Payer: MEDICARE

## 2025-01-29 DIAGNOSIS — I48.11 LONGSTANDING PERSISTENT ATRIAL FIBRILLATION (HCC): Primary | ICD-10-CM

## 2025-01-29 DIAGNOSIS — I48.20 CHRONIC ATRIAL FIBRILLATION (HCC): ICD-10-CM

## 2025-01-29 DIAGNOSIS — I48.19 PERSISTENT ATRIAL FIBRILLATION (HCC): ICD-10-CM

## 2025-01-29 LAB
INR BLD: 2.4
PROTIME: NORMAL

## 2025-01-29 PROCEDURE — 85610 PROTHROMBIN TIME: CPT | Performed by: PHARMACIST

## 2025-01-29 PROCEDURE — 99211 OFF/OP EST MAY X REQ PHY/QHP: CPT | Performed by: PHARMACIST

## 2025-01-29 NOTE — TELEPHONE ENCOUNTER
Dr. Richardson,    The patient's warfarin therapy is currently being managed by [facility]. The referral on file is from Dr. Blevins. Please sign pended referral for patient to continue care with the anticoagulation clinic.     Note: Dr. Blevins recommended INR goal of 1.8-2.2. Patient refuses frequent INR monitoring due to difficulty getting here with his hx of polio so we have been targeting 1.5-2.5. Please let me know if you would like to change this now that patient is under your care.     Thanks,   Ana Burton, PharmD, BCPS  [facility]  [place]: [phone]  [place]: [phone]  1/29/2025 9:26 AM

## 2025-01-29 NOTE — PROGRESS NOTES
ANTICOAGULATION SERVICE    Nikos Cisneros is a 79 y.o. male with PMHx significant for A-fib, HTN, HLD who presents to clinic 1/29/2025 for anticoagulation monitoring and adjustment.    Anticoagulation Indication(s):  Afib    Referring Physician:  Dr. Blevins - sent new referral to Dr. Richardson   Goal INR Range:  1.8-2.2, but 1.5-2.5 acceptable as patient refuses frequent INR monitoring  Duration of Anticoagulation Therapy:  Indefinite  Time of day dose taken:  PM  Product patient has at home:  warfarin 5 mg (peach)    [id number] Score for Atrial Fibrillation Stroke Risk   Risk   Factors  Component Value   C CHF No 0   H HTN Yes 1   A2 Age >= 75 Yes,  (79 y.o.) 2   D DM No 0   S2 Prior Stroke/TIA Yes 2   V Vascular Disease No 0   A Age 65-74 No,  (79 y.o.) 0   Sc Sex male 0    [id number]  Score  5   Score last updated 1/9/20 9:42 AM    INR Summary                            Warfarin regimen (mg)  Date INR   A/P    Sun Mon Tue Wed Thu Fri Sat Mg/wk  1/29 2.4 At goal, continue    5 2.5 5 5 5 5 5 32.5  11/27 1.9 At goal, continue    5 2.5 5 5 5 5 5 32.5  10/2 3.2 Above, hold+dec    5 2.5 5 0/5 5 5 5 32.5  8/7 3.1 Above, hold x 1   5 5 5 0/5 5 5 5 35  6/12 2.4 At goal, continue   5 5 5 5 5 5 5 35  4/17 2.2 At goal, continue   5 5 5 5 5 5 5 35  2/14 2.3 At goal, continue   5 5 5 5 5 5 5 35  12/20 2.5 At goal, continue   5 5 5 5 5 5 5 35  10/25 3.3 Above goal, (extra) hold x1 5 5 5 0/5 5 5 5 35  8/30 2.3 At goal, continue   5 5 5 5 5 5 5 35  7/17 2.15 At goal, continue   5 5 5 5 5 5 5 35  6/14 1.7 Below goal, continue   5 5 5 5 5 5 5 35  5/24 1.9 At goal, continue   5 5 5 5 5 5 5 35  3/29 2.5 At goal, continue   5 5 5 5 5 5 5 35  2/1 2.1 At goal, continue   5 5 5 5 5 5 5 35  12/7 1.7 Below goal, continue   5 5 5 5 5 5 5 35  10/12 2.5 Above goal, continue   5 5 5 5 5 5 5 35  8/17 1.7 Below goal, continue  5 5 5 5 5 5 5 35  6/22 2.0 At goal, no change  5 5 5 5 5 5 5 35  4/27 2.2 At goal, no

## 2025-02-03 DIAGNOSIS — Z79.01 LONG TERM CURRENT USE OF ANTICOAGULANT: Primary | ICD-10-CM

## 2025-02-03 DIAGNOSIS — I48.20 ATRIAL FIBRILLATION, CHRONIC (HCC): ICD-10-CM

## 2025-02-03 DIAGNOSIS — I63.9 ISCHEMIC STROKE (HCC): ICD-10-CM

## 2025-02-03 DIAGNOSIS — Z79.01 ANTICOAGULATED ON COUMADIN: ICD-10-CM

## 2025-02-05 RX ORDER — TORSEMIDE 20 MG/1
20 TABLET ORAL DAILY PRN
Qty: 30 TABLET | Refills: 3 | Status: SHIPPED | OUTPATIENT
Start: 2025-02-05

## 2025-02-14 ENCOUNTER — TELEPHONE (OUTPATIENT)
Dept: INTERNAL MEDICINE CLINIC | Age: 80
End: 2025-02-14

## 2025-02-14 DIAGNOSIS — R73.03 PREDIABETES: ICD-10-CM

## 2025-02-14 DIAGNOSIS — I48.19 PERSISTENT ATRIAL FIBRILLATION (HCC): ICD-10-CM

## 2025-02-14 DIAGNOSIS — E07.9 THYROID DISEASE: ICD-10-CM

## 2025-02-14 LAB
ALBUMIN SERPL-MCNC: 4 G/DL (ref 3.4–5)
ALBUMIN/GLOB SERPL: 1.4 {RATIO} (ref 1.1–2.2)
ALP SERPL-CCNC: 84 U/L (ref 40–129)
ALT SERPL-CCNC: 24 U/L (ref 10–40)
ANION GAP SERPL CALCULATED.3IONS-SCNC: 10 MMOL/L (ref 3–16)
AST SERPL-CCNC: 28 U/L (ref 15–37)
BASOPHILS # BLD: 0.1 K/UL (ref 0–0.2)
BASOPHILS NFR BLD: 1.5 %
BILIRUB SERPL-MCNC: 0.6 MG/DL (ref 0–1)
BUN SERPL-MCNC: 18 MG/DL (ref 7–20)
CALCIUM SERPL-MCNC: 9.5 MG/DL (ref 8.3–10.6)
CHLORIDE SERPL-SCNC: 101 MMOL/L (ref 99–110)
CO2 SERPL-SCNC: 31 MMOL/L (ref 21–32)
CREAT SERPL-MCNC: 0.7 MG/DL (ref 0.8–1.3)
DEPRECATED RDW RBC AUTO: 14.2 % (ref 12.4–15.4)
EOSINOPHIL # BLD: 0.2 K/UL (ref 0–0.6)
EOSINOPHIL NFR BLD: 2.5 %
EST. AVERAGE GLUCOSE BLD GHB EST-MCNC: 125.5 MG/DL
GFR SERPLBLD CREATININE-BSD FMLA CKD-EPI: >90 ML/MIN/{1.73_M2}
GLUCOSE SERPL-MCNC: 126 MG/DL (ref 70–99)
HBA1C MFR BLD: 6 %
HCT VFR BLD AUTO: 47.9 % (ref 40.5–52.5)
HGB BLD-MCNC: 15.6 G/DL (ref 13.5–17.5)
LYMPHOCYTES # BLD: 2.2 K/UL (ref 1–5.1)
LYMPHOCYTES NFR BLD: 23.1 %
MCH RBC QN AUTO: 31.1 PG (ref 26–34)
MCHC RBC AUTO-ENTMCNC: 32.6 G/DL (ref 31–36)
MCV RBC AUTO: 95.6 FL (ref 80–100)
MONOCYTES # BLD: 0.9 K/UL (ref 0–1.3)
MONOCYTES NFR BLD: 10.1 %
NEUTROPHILS # BLD: 5.9 K/UL (ref 1.7–7.7)
NEUTROPHILS NFR BLD: 62.8 %
PLATELET # BLD AUTO: 294 K/UL (ref 135–450)
PMV BLD AUTO: 9.4 FL (ref 5–10.5)
POTASSIUM SERPL-SCNC: 4.1 MMOL/L (ref 3.5–5.1)
PROT SERPL-MCNC: 6.8 G/DL (ref 6.4–8.2)
RBC # BLD AUTO: 5.01 M/UL (ref 4.2–5.9)
SODIUM SERPL-SCNC: 142 MMOL/L (ref 136–145)
TSH SERPL DL<=0.005 MIU/L-ACNC: 0.8 UIU/ML (ref 0.27–4.2)
WBC # BLD AUTO: 9.4 K/UL (ref 4–11)

## 2025-02-14 NOTE — TELEPHONE ENCOUNTER
Lipid is covered once a year, last time checked 3/25/24.  Pt advised and just wanted to make sure Dr. Arguelles did not need him to repeat testing again.

## 2025-02-14 NOTE — TELEPHONE ENCOUNTER
Pt states the Cholesterol test were not on original lab work and pt would like know if Dr. Arguelles wanted this order to be added or not. Pt has already done lab work    [phone]  Pls call and advise

## 2025-02-17 NOTE — TELEPHONE ENCOUNTER
Ideally yes, I would like to see the cholesterol as well for that visit, but we can order this to be done after visit when it is covered and try to time better follow-ups moving forward

## 2025-03-12 ENCOUNTER — OFFICE VISIT (OUTPATIENT)
Dept: INTERNAL MEDICINE CLINIC | Age: 80
End: 2025-03-12
Payer: MEDICARE

## 2025-03-12 ENCOUNTER — ANTI-COAG VISIT (OUTPATIENT)
Dept: PHARMACY | Age: 80
End: 2025-03-12
Payer: MEDICARE

## 2025-03-12 VITALS
WEIGHT: 233.2 LBS | OXYGEN SATURATION: 94 % | TEMPERATURE: 97.5 F | HEART RATE: 54 BPM | SYSTOLIC BLOOD PRESSURE: 110 MMHG | BODY MASS INDEX: 32.52 KG/M2 | DIASTOLIC BLOOD PRESSURE: 60 MMHG

## 2025-03-12 DIAGNOSIS — E78.2 MIXED HYPERLIPIDEMIA: Primary | ICD-10-CM

## 2025-03-12 DIAGNOSIS — R73.03 PREDIABETES: ICD-10-CM

## 2025-03-12 DIAGNOSIS — I48.11 LONGSTANDING PERSISTENT ATRIAL FIBRILLATION (HCC): Primary | ICD-10-CM

## 2025-03-12 DIAGNOSIS — R60.0 PERIPHERAL EDEMA: ICD-10-CM

## 2025-03-12 DIAGNOSIS — E07.9 THYROID DISEASE: ICD-10-CM

## 2025-03-12 DIAGNOSIS — I48.11 LONGSTANDING PERSISTENT ATRIAL FIBRILLATION (HCC): ICD-10-CM

## 2025-03-12 LAB
INTERNATIONAL NORMALIZATION RATIO, POC: 2.4
PROTHROMBIN TIME, POC: 0

## 2025-03-12 PROCEDURE — 3078F DIAST BP <80 MM HG: CPT | Performed by: INTERNAL MEDICINE

## 2025-03-12 PROCEDURE — 99214 OFFICE O/P EST MOD 30 MIN: CPT | Performed by: INTERNAL MEDICINE

## 2025-03-12 PROCEDURE — 1159F MED LIST DOCD IN RCRD: CPT | Performed by: INTERNAL MEDICINE

## 2025-03-12 PROCEDURE — 1123F ACP DISCUSS/DSCN MKR DOCD: CPT | Performed by: INTERNAL MEDICINE

## 2025-03-12 PROCEDURE — 99211 OFF/OP EST MAY X REQ PHY/QHP: CPT | Performed by: PHARMACIST

## 2025-03-12 PROCEDURE — 85610 PROTHROMBIN TIME: CPT | Performed by: PHARMACIST

## 2025-03-12 PROCEDURE — 3074F SYST BP LT 130 MM HG: CPT | Performed by: INTERNAL MEDICINE

## 2025-03-12 PROCEDURE — G2211 COMPLEX E/M VISIT ADD ON: HCPCS | Performed by: INTERNAL MEDICINE

## 2025-03-12 RX ORDER — LEVOTHYROXINE SODIUM 125 UG/1
TABLET ORAL
Qty: 45 TABLET | Refills: 1 | Status: SHIPPED | OUTPATIENT
Start: 2025-03-12

## 2025-03-12 RX ORDER — LEVOTHYROXINE SODIUM 112 UG/1
TABLET ORAL
Qty: 45 TABLET | Refills: 1 | Status: SHIPPED | OUTPATIENT
Start: 2025-03-12

## 2025-03-12 RX ORDER — METFORMIN HYDROCHLORIDE 500 MG/1
500 TABLET, EXTENDED RELEASE ORAL
Qty: 90 TABLET | Refills: 1 | Status: SHIPPED | OUTPATIENT
Start: 2025-03-12

## 2025-03-12 RX ORDER — TORSEMIDE 10 MG/1
5-10 TABLET ORAL DAILY
Qty: 30 TABLET | Refills: 3 | Status: SHIPPED | OUTPATIENT
Start: 2025-03-12

## 2025-03-12 SDOH — ECONOMIC STABILITY: FOOD INSECURITY: WITHIN THE PAST 12 MONTHS, THE FOOD YOU BOUGHT JUST DIDN'T LAST AND YOU DIDN'T HAVE MONEY TO GET MORE.: NEVER TRUE

## 2025-03-12 SDOH — ECONOMIC STABILITY: FOOD INSECURITY: WITHIN THE PAST 12 MONTHS, YOU WORRIED THAT YOUR FOOD WOULD RUN OUT BEFORE YOU GOT MONEY TO BUY MORE.: NEVER TRUE

## 2025-03-12 ASSESSMENT — ENCOUNTER SYMPTOMS: SHORTNESS OF BREATH: 0

## 2025-03-12 ASSESSMENT — PATIENT HEALTH QUESTIONNAIRE - PHQ9
SUM OF ALL RESPONSES TO PHQ QUESTIONS 1-9: 0
1. LITTLE INTEREST OR PLEASURE IN DOING THINGS: NOT AT ALL
SUM OF ALL RESPONSES TO PHQ QUESTIONS 1-9: 0
2. FEELING DOWN, DEPRESSED OR HOPELESS: NOT AT ALL

## 2025-03-12 NOTE — ASSESSMENT & PLAN NOTE
Stable  -continue atenolol   -on coumadin per coumadin clinic  -Sees Dr. Richardson   -As above adjusting thyroid function due to concern to overtreatment

## 2025-03-12 NOTE — PROGRESS NOTES
[facility]  Follow up visit   3/12/2025    Nikos Cisneros (:  1945) is a 79 y.o. male, here for evaluation of the following medical concerns:    Chief Complaint   Patient presents with    Hypertension    Hyperlipidemia           Discuss Labs        ASSESSMENT/ PLAN:  Hyperlipidemia  -continue pravastatin and zetia per cards  -past tx- simvastatin (myalgia)     Thyroid disease  -hx of graves ~16 y ago, s/p thyroidectomy , s/p radioactive iodine   -hx of graves eye disease requiring surgical repair  -does not see endo any more  -TSH now indicates overtreatment for age especially given A-fib and need for dose decrease-lower Synthroid to 125 alternating with 112 mcg every other day  -Repeat TSH in 6 weeks and again before next visit to ensure stability    Prediabetes  Recent A1c stable at 6% but fasting sugar 126 in diabetes range  -Will add low-dose metformin 500 mg  -Repeat A1c before next visit    Peripheral edema  -Was restarted on torsemide 10 mg per Dr. Richardson for leg swelling, he only takes this 2 times a week due to significant urination interfering with quality of life and dizziness.  Possibly making him hypotensive given blood pressure today 110/60 without it.  Can try lower dose to 5 mg to see if helps with swelling while no significant side effects    Atrial fibrillation (HCC)  Stable  -continue atenolol   -on coumadin per coumadin clinic  -Sees Dr. Richardson   -As above adjusting thyroid function due to concern to overtreatment      Orders Placed This Encounter   Procedures    TSH reflex to FT4,FT3 ([place] Only)    Lipid Panel    Comprehensive Metabolic Panel    TSH reflex to FT4, FT3    Hemoglobin A1C     Orders Placed This Encounter   Medications    levothyroxine (SYNTHROID) 112 MCG tablet     Sig: Take 125 mcg alternating with 112 mcg every other day     Dispense:  45 tablet     Refill:  1    levothyroxine (SYNTHROID) 125 MCG tablet     Sig: Take 125 mcg alternating with 112 mcg

## 2025-03-12 NOTE — ASSESSMENT & PLAN NOTE
Recent A1c stable at 6% but fasting sugar 126 in diabetes range  -Will add low-dose metformin 500 mg  -Repeat A1c before next visit

## 2025-03-12 NOTE — PROGRESS NOTES
ANTICOAGULATION SERVICE    Nikos Cisneros is a 79 y.o. male with PMHx significant for A-fib, HTN, HLD who presents to clinic 3/12/2025 for anticoagulation monitoring and adjustment.    Anticoagulation Indication(s):  Afib    Referring Physician:  Dr. Richardson   Goal INR Range:  2-3  Duration of Anticoagulation Therapy:  Indefinite  Time of day dose taken:  PM  Product patient has at home:  warfarin 5 mg (peach)    [id number] Score for Atrial Fibrillation Stroke Risk   Risk   Factors  Component Value   C CHF No 0   H HTN Yes 1   A2 Age >= 75 Yes,  (79 y.o.) 2   D DM No 0   S2 Prior Stroke/TIA Yes 2   V Vascular Disease No 0   A Age 65-74 No,  (79 y.o.) 0   Sc Sex male 0    [id number]  Score  5   Score last updated 1/9/20 9:42 AM    INR Summary                            Warfarin regimen (mg)  Date INR   A/P    Sun Mon Tue Wed Thu Fri Sat Mg/wk  3/12 2.4 At goal, continue    5 2.5 5 5 5 5 5 32.5  INR goal changed to 2-3 per Dr. Richardson   1/29 2.4 At goal, continue    5 2.5 5 5 5 5 5 32.5  11/27 1.9 At goal, continue    5 2.5 5 5 5 5 5 32.5  10/2 3.2 Above, hold+dec    5 2.5 5 0/5 5 5 5 32.5  8/7 3.1 Above, hold x 1   5 5 5 0/5 5 5 5 35  6/12 2.4 At goal, continue   5 5 5 5 5 5 5 35  4/17 2.2 At goal, continue   5 5 5 5 5 5 5 35  2/14 2.3 At goal, continue   5 5 5 5 5 5 5 35  12/20 2.5 At goal, continue   5 5 5 5 5 5 5 35  10/25 3.3 Above goal, (extra) hold x1 5 5 5 0/5 5 5 5 35  8/30 2.3 At goal, continue   5 5 5 5 5 5 5 35  7/17 2.15 At goal, continue   5 5 5 5 5 5 5 35  6/14 1.7 Below goal, continue   5 5 5 5 5 5 5 35  5/24 1.9 At goal, continue   5 5 5 5 5 5 5 35  3/29 2.5 At goal, continue   5 5 5 5 5 5 5 35  2/1 2.1 At goal, continue   5 5 5 5 5 5 5 35  12/7 1.7 Below goal, continue   5 5 5 5 5 5 5 35  10/12 2.5 Above goal, continue   5 5 5 5 5 5 5 35  8/17 1.7 Below goal, continue  5 5 5 5 5 5 5 35  6/22 2.0 At goal, no change  5 5 5 5 5 5 5 35  4/27 2.2 At goal, no change  5 5 5 5 5 5 5 35  3/2 1.9 At goal, no

## 2025-03-12 NOTE — ASSESSMENT & PLAN NOTE
-hx of graves ~16 y ago, s/p thyroidectomy , s/p radioactive iodine   -hx of graves eye disease requiring surgical repair  -does not see endo any more  -TSH now indicates overtreatment for age especially given A-fib and need for dose decrease-lower Synthroid to 125 alternating with 112 mcg every other day  -Repeat TSH in 6 weeks and again before next visit to ensure stability

## 2025-03-12 NOTE — PATIENT INSTRUCTIONS
Change synthroid to 125 mcg alternating with 112 mcg every other day   Add metformin   Lower torsemide to 5 mg

## 2025-03-12 NOTE — ASSESSMENT & PLAN NOTE
-Was restarted on torsemide 10 mg per Dr. Richardson for leg swelling, he only takes this 2 times a week due to significant urination interfering with quality of life and dizziness.  Possibly making him hypotensive given blood pressure today 110/60 without it.  Can try lower dose to 5 mg to see if helps with swelling while no significant side effects

## 2025-03-24 NOTE — TELEPHONE ENCOUNTER
Pt has been having dizzy spells with all the medication he's taking. Pt wants to lower his  atenolol  down to 25 mg as Dr. Geronimo was supposed to but it never happened.     \Please call and advise

## 2025-03-25 RX ORDER — ATENOLOL 50 MG/1
25 TABLET ORAL DAILY
Qty: 30 TABLET | Refills: 3
Start: 2025-03-25

## 2025-03-25 NOTE — TELEPHONE ENCOUNTER
That is okay with me, but might want to come in to evaluate the symptoms, cannot determine this is the cause from a message.   He will only see me back in September, but sees his cardiologist next month.  If lowering atenolol dose will need to ensure good A-fib and hypertension control with this dose when he sees him

## 2025-04-22 ENCOUNTER — OFFICE VISIT (OUTPATIENT)
Dept: CARDIOLOGY CLINIC | Age: 80
End: 2025-04-22
Payer: MEDICARE

## 2025-04-22 VITALS
SYSTOLIC BLOOD PRESSURE: 124 MMHG | WEIGHT: 232.8 LBS | BODY MASS INDEX: 32.47 KG/M2 | HEART RATE: 81 BPM | DIASTOLIC BLOOD PRESSURE: 80 MMHG

## 2025-04-22 DIAGNOSIS — I48.11 LONGSTANDING PERSISTENT ATRIAL FIBRILLATION (HCC): Primary | ICD-10-CM

## 2025-04-22 DIAGNOSIS — E07.9 THYROID DISEASE: ICD-10-CM

## 2025-04-22 LAB — TSH SERPL DL<=0.005 MIU/L-ACNC: 1.41 UIU/ML (ref 0.27–4.2)

## 2025-04-22 PROCEDURE — 3079F DIAST BP 80-89 MM HG: CPT | Performed by: INTERNAL MEDICINE

## 2025-04-22 PROCEDURE — G2211 COMPLEX E/M VISIT ADD ON: HCPCS | Performed by: INTERNAL MEDICINE

## 2025-04-22 PROCEDURE — 3074F SYST BP LT 130 MM HG: CPT | Performed by: INTERNAL MEDICINE

## 2025-04-22 PROCEDURE — 1123F ACP DISCUSS/DSCN MKR DOCD: CPT | Performed by: INTERNAL MEDICINE

## 2025-04-22 PROCEDURE — 99214 OFFICE O/P EST MOD 30 MIN: CPT | Performed by: INTERNAL MEDICINE

## 2025-04-22 PROCEDURE — 1159F MED LIST DOCD IN RCRD: CPT | Performed by: INTERNAL MEDICINE

## 2025-04-22 RX ORDER — ATENOLOL 25 MG/1
25 TABLET ORAL DAILY
Qty: 90 TABLET | Refills: 3 | Status: SHIPPED | OUTPATIENT
Start: 2025-04-22

## 2025-04-22 NOTE — PROGRESS NOTES
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 Cazares), chronic HFpEF.    Echo 05/2023: Mild LVH, LVEF 60%, indeterminate diastolic function, mild AI/MR/TR/PI.  Compared to echo 08/2019, no changes.    ECG 5/30/2023: AF 84 bpm, old inferior MI.    Lipid profile 03/2024: , HDL 49, ,  on pravastatin 20 daily.    Patient returns to clinic today for a follow-up.  His level of activity is severely limited and uses crutches because of history of polio with bilateral lower extremity weakness left more than right.  He reports no concerning ischemic or congestive symptoms. His SBP was low hence PCP changed his atenolol to 1/2 of 50 mg; has been taking it as half for about 2 weeks.  Patient could not tolerate pravastatin 40 mg daily due to myalgias and therefore it was reduced back to 20 mg and Zetia was added.      Histories     Past Medical History:   has a past medical history of Atrial fibrillation (HCC), Cancer (HCC), Epididymitis, Grave's disease, Graves disease, Graves' eye disease, Hyperlipidemia, Hypertension, Irregular heart rate, Polio, Post-polio muscle weakness, and Swelling of joint, elbow, left.    Surgical History:   has a past surgical history that includes Eye surgery; Thyroid surgery; Leg Surgery; bronchoscopy; other surgical history (1/2/14); Abdomen surgery; hernia repair (Right, 1995); Anus surgery (9-18-14); Colonoscopy; Anus surgery (N/A, 3/8/2021); and Carotid artery surgery (Left, 6/7/2023).     Social History:   reports that he quit smoking about 54 years

## 2025-04-23 DIAGNOSIS — Z79.01 CURRENT USE OF LONG TERM ANTICOAGULATION: ICD-10-CM

## 2025-04-23 DIAGNOSIS — I48.19 PERSISTENT ATRIAL FIBRILLATION (HCC): ICD-10-CM

## 2025-04-24 ENCOUNTER — RESULTS FOLLOW-UP (OUTPATIENT)
Dept: INTERNAL MEDICINE CLINIC | Age: 80
End: 2025-04-24

## 2025-04-24 RX ORDER — WARFARIN SODIUM 5 MG/1
5 TABLET ORAL DAILY
Qty: 90 TABLET | Refills: 1 | Status: SHIPPED | OUTPATIENT
Start: 2025-04-24

## 2025-05-07 ENCOUNTER — ANTI-COAG VISIT (OUTPATIENT)
Dept: PHARMACY | Age: 80
End: 2025-05-07
Payer: MEDICARE

## 2025-05-07 DIAGNOSIS — I48.20 CHRONIC ATRIAL FIBRILLATION (HCC): Primary | ICD-10-CM

## 2025-05-07 LAB
INTERNATIONAL NORMALIZATION RATIO, POC: 2.3
PROTHROMBIN TIME, POC: 0

## 2025-05-07 PROCEDURE — 99211 OFF/OP EST MAY X REQ PHY/QHP: CPT

## 2025-05-07 PROCEDURE — 85610 PROTHROMBIN TIME: CPT

## 2025-05-07 NOTE — PROGRESS NOTES
ANTICOAGULATION SERVICE    Nikos Cisneros is a 79 y.o. male with PMHx significant for A-fib, HTN, HLD who presents to clinic 5/7/2025 for anticoagulation monitoring and adjustment.    Anticoagulation Indication(s):  Afib    Referring Physician:  Dr. Richardson   Goal INR Range:  2-3  Duration of Anticoagulation Therapy:  Indefinite  Time of day dose taken:  PM  Product patient has at home:  warfarin 5 mg (peach)    [id number] Score for Atrial Fibrillation Stroke Risk   Risk   Factors  Component Value   C CHF No 0   H HTN Yes 1   A2 Age >= 75 Yes,  (79 y.o.) 2   D DM No 0   S2 Prior Stroke/TIA Yes 2   V Vascular Disease No 0   A Age 65-74 No,  (79 y.o.) 0   Sc Sex male 0    [id number]  Score  5   Score last updated 1/9/20 9:42 AM    INR Summary                            Warfarin regimen (mg)  Date INR   A/P    Sun Mon Tue Wed Thu Fri Sat Mg/wk  5/7 2.3 At goal, continue    5 2.5 5 5 5 5 5 32.5  3/12 2.4 At goal, continue    5 2.5 5 5 5 5 5 32.5  INR goal changed to 2-3 per Dr. Richardson   1/29 2.4 At goal, continue    5 2.5 5 5 5 5 5 32.5  11/27 1.9 At goal, continue    5 2.5 5 5 5 5 5 32.5  10/2 3.2 Above, hold+dec    5 2.5 5 0/5 5 5 5 32.5  8/7 3.1 Above, hold x 1   5 5 5 0/5 5 5 5 35  6/12 2.4 At goal, continue   5 5 5 5 5 5 5 35  4/17 2.2 At goal, continue   5 5 5 5 5 5 5 35  2/14 2.3 At goal, continue   5 5 5 5 5 5 5 35  12/20 2.5 At goal, continue   5 5 5 5 5 5 5 35  10/25 3.3 Above goal, (extra) hold x1 5 5 5 0/5 5 5 5 35  8/30 2.3 At goal, continue   5 5 5 5 5 5 5 35  7/17 2.15 At goal, continue   5 5 5 5 5 5 5 35  6/14 1.7 Below goal, continue   5 5 5 5 5 5 5 35  5/24 1.9 At goal, continue   5 5 5 5 5 5 5 35  3/29 2.5 At goal, continue   5 5 5 5 5 5 5 35  2/1 2.1 At goal, continue   5 5 5 5 5 5 5 35  12/7 1.7 Below goal, continue   5 5 5 5 5 5 5 35  10/12 2.5 Above goal, continue   5 5 5 5 5 5 5 35  8/17 1.7 Below goal, continue  5 5 5 5 5 5 5 35  6/22 2.0 At goal, no change  5 5 5 5 5 5 5 35  4/27 2.2 At

## 2025-07-03 DIAGNOSIS — R60.0 PERIPHERAL EDEMA: ICD-10-CM

## 2025-07-03 RX ORDER — TORSEMIDE 10 MG/1
5-10 TABLET ORAL DAILY
Qty: 90 TABLET | Refills: 1 | Status: SHIPPED | OUTPATIENT
Start: 2025-07-03

## 2025-07-03 NOTE — TELEPHONE ENCOUNTER
Last appointment: 3/12/2025  Next appointment: 9/18/2025        Last refill: (3/12/2025) by Tres Arguelles MD

## 2025-07-07 ENCOUNTER — TELEPHONE (OUTPATIENT)
Dept: INTERNAL MEDICINE CLINIC | Age: 80
End: 2025-07-07

## 2025-07-07 NOTE — TELEPHONE ENCOUNTER
Pt states all of the sudden his stomach started to oumou up and is now sore. Feeling better now but still on the sore side. More like muscle pain not like nausea.   Would like advice, does he need to be seen?

## 2025-07-08 NOTE — TELEPHONE ENCOUNTER
Unable to give  advice without evaluation, if still has symps, should be evaluated, can see NP if needed

## 2025-07-09 ENCOUNTER — ANTI-COAG VISIT (OUTPATIENT)
Dept: PHARMACY | Age: 80
End: 2025-07-09
Payer: MEDICARE

## 2025-07-09 DIAGNOSIS — I48.11 LONGSTANDING PERSISTENT ATRIAL FIBRILLATION (HCC): Primary | ICD-10-CM

## 2025-07-09 LAB
INTERNATIONAL NORMALIZATION RATIO, POC: 2.4
PROTHROMBIN TIME, POC: 0

## 2025-07-09 PROCEDURE — 85610 PROTHROMBIN TIME: CPT

## 2025-07-09 PROCEDURE — 99211 OFF/OP EST MAY X REQ PHY/QHP: CPT

## 2025-07-09 NOTE — PROGRESS NOTES
ANTICOAGULATION SERVICE    Nikos Cisneros is a 80 y.o. male with PMHx significant for A-fib, HTN, HLD who presents to clinic 7/9/2025 for anticoagulation monitoring and adjustment.    Anticoagulation Indication(s):  Afib    Referring Physician:  Dr. Richardson   Goal INR Range:  2-3  Duration of Anticoagulation Therapy:  Indefinite  Time of day dose taken:  PM  Product patient has at home:  warfarin 5 mg (peach)    [id number] Score for Atrial Fibrillation Stroke Risk   Risk   Factors  Component Value   C CHF No 0   H HTN Yes 1   A2 Age >= 75 Yes,  (80 y.o.) 2   D DM No 0   S2 Prior Stroke/TIA Yes 2   V Vascular Disease No 0   A Age 65-74 No,  (80 y.o.) 0   Sc Sex male 0    [id number]  Score  5   Score last updated 1/9/20 9:42 AM    INR Summary                            Warfarin regimen (mg)  Date INR   A/P    Sun Mon Tue Wed Thu Fri Sat Mg/wk  7/8 2.4 At goal, continue    5 2.5 5 5 5 5 5 32.5  5/7 2.3 At goal, continue    5 2.5 5 5 5 5 5 32.5  3/12 2.4 At goal, continue    5 2.5 5 5 5 5 5 32.5  INR goal changed to 2-3 per Dr. Richardson   1/29 2.4 At goal, continue    5 2.5 5 5 5 5 5 32.5  11/27 1.9 At goal, continue    5 2.5 5 5 5 5 5 32.5  10/2 3.2 Above, hold+dec    5 2.5 5 0/5 5 5 5 32.5  8/7 3.1 Above, hold x 1   5 5 5 0/5 5 5 5 35  6/12 2.4 At goal, continue   5 5 5 5 5 5 5 35  4/17 2.2 At goal, continue   5 5 5 5 5 5 5 35  2/14 2.3 At goal, continue   5 5 5 5 5 5 5 35  12/20 2.5 At goal, continue   5 5 5 5 5 5 5 35  10/25 3.3 Above goal, (extra) hold x1 5 5 5 0/5 5 5 5 35  8/30 2.3 At goal, continue   5 5 5 5 5 5 5 35  7/17 2.15 At goal, continue   5 5 5 5 5 5 5 35  6/14 1.7 Below goal, continue   5 5 5 5 5 5 5 35  5/24 1.9 At goal, continue   5 5 5 5 5 5 5 35  3/29 2.5 At goal, continue   5 5 5 5 5 5 5 35  2/1 2.1 At goal, continue   5 5 5 5 5 5 5 35  12/7 1.7 Below goal, continue   5 5 5 5 5 5 5 35  10/12 2.5 Above goal, continue   5 5 5 5 5 5 5 35  8/17 1.7 Below goal, continue  5 5 5 5 5 5 5 35  6/22 2.0 At

## 2025-08-15 DIAGNOSIS — I48.11 LONGSTANDING PERSISTENT ATRIAL FIBRILLATION (HCC): Primary | ICD-10-CM

## 2025-08-19 DIAGNOSIS — R73.03 PREDIABETES: ICD-10-CM

## 2025-08-19 DIAGNOSIS — E07.9 THYROID DISEASE: ICD-10-CM

## 2025-08-19 DIAGNOSIS — E78.2 MIXED HYPERLIPIDEMIA: ICD-10-CM

## 2025-08-19 LAB
ALBUMIN SERPL-MCNC: 4 G/DL (ref 3.4–5)
ALBUMIN/GLOB SERPL: 1.4 {RATIO} (ref 1.1–2.2)
ALP SERPL-CCNC: 77 U/L (ref 40–129)
ALT SERPL-CCNC: 18 U/L (ref 10–40)
ANION GAP SERPL CALCULATED.3IONS-SCNC: 13 MMOL/L (ref 3–16)
AST SERPL-CCNC: 25 U/L (ref 15–37)
BILIRUB SERPL-MCNC: 0.7 MG/DL (ref 0–1)
BUN SERPL-MCNC: 13 MG/DL (ref 7–20)
CALCIUM SERPL-MCNC: 9.6 MG/DL (ref 8.3–10.6)
CHLORIDE SERPL-SCNC: 101 MMOL/L (ref 99–110)
CHOLEST SERPL-MCNC: 154 MG/DL (ref 0–199)
CO2 SERPL-SCNC: 27 MMOL/L (ref 21–32)
CREAT SERPL-MCNC: 0.6 MG/DL (ref 0.8–1.3)
EST. AVERAGE GLUCOSE BLD GHB EST-MCNC: 125.5 MG/DL
GFR SERPLBLD CREATININE-BSD FMLA CKD-EPI: >90 ML/MIN/{1.73_M2}
GLUCOSE SERPL-MCNC: 101 MG/DL (ref 70–99)
HBA1C MFR BLD: 6 %
HDLC SERPL-MCNC: 43 MG/DL (ref 40–60)
LDLC SERPL CALC-MCNC: 87 MG/DL
POTASSIUM SERPL-SCNC: 4.1 MMOL/L (ref 3.5–5.1)
PROT SERPL-MCNC: 6.8 G/DL (ref 6.4–8.2)
SODIUM SERPL-SCNC: 141 MMOL/L (ref 136–145)
TRIGL SERPL-MCNC: 119 MG/DL (ref 0–150)
TSH SERPL DL<=0.005 MIU/L-ACNC: 1.6 UIU/ML (ref 0.27–4.2)
VLDLC SERPL CALC-MCNC: 24 MG/DL

## 2025-08-23 DIAGNOSIS — E07.9 THYROID DISEASE: ICD-10-CM

## 2025-08-25 ENCOUNTER — TELEPHONE (OUTPATIENT)
Dept: INTERNAL MEDICINE CLINIC | Age: 80
End: 2025-08-25

## 2025-08-25 RX ORDER — LEVOTHYROXINE SODIUM 112 UG/1
TABLET ORAL
Qty: 45 TABLET | Refills: 1 | Status: SHIPPED | OUTPATIENT
Start: 2025-08-25

## 2025-08-26 DIAGNOSIS — R73.03 PREDIABETES: ICD-10-CM

## 2025-08-26 RX ORDER — METFORMIN HYDROCHLORIDE 500 MG/1
500 TABLET, EXTENDED RELEASE ORAL
Qty: 90 TABLET | Refills: 1 | Status: SHIPPED | OUTPATIENT
Start: 2025-08-26

## (undated) DEVICE — ST FLUFF LG 1 PLY: Brand: DEROYAL

## (undated) DEVICE — SPONGE,LAP,18"X18",DLX,XR,ST,5/PK,40/PK: Brand: MEDLINE

## (undated) DEVICE — STANDARD INSTRUMENT WIPE: Brand: DEROYAL

## (undated) DEVICE — ADHESIVE SKIN CLOSURE WND 8.661X1.5 IN 22 CM LIQUIBAND SECUR

## (undated) DEVICE — BAG PRSS INFUS 500ML NYL NETTED BK VISIBLE COLOR-CODED G L

## (undated) DEVICE — POSITIONER HD REST FOAM CMFRT TCH

## (undated) DEVICE — PROVE COVER: Brand: UNBRANDED

## (undated) DEVICE — UNDERPANTS INCONT XL 45-70IN KNIT SEAMLESS DSGN COLOR-CODED

## (undated) DEVICE — WIPE INSTR W73XL73CM VISITEC

## (undated) DEVICE — SUTURE CHROMIC GUT SZ 2-0 L27IN ABSRB BRN L40MM CT 1/2 CIR 801H

## (undated) DEVICE — ELECTRODE PT RET AD L9FT HI MOIST COND ADH HYDRGEL CORDED

## (undated) DEVICE — PACK,UNIVERSAL,NO GOWNS: Brand: MEDLINE

## (undated) DEVICE — 500ML,PRESSURE INFUSER W/STOPCOCK: Brand: MEDLINE

## (undated) DEVICE — LARGE BORE STOPCOCK WITH ROTATING MALE LUER LOCK

## (undated) DEVICE — SET ADMIN 70ML FLTR 170UM NRMTHRM FAST FLO FLD WRM DISP LEV

## (undated) DEVICE — TAPE UMB W1 8XL24IN POLY

## (undated) DEVICE — GARMENT,MEDLINE,DVT,INT,CALF,MED, GEN2: Brand: MEDLINE

## (undated) DEVICE — SET ADMIN 25ML L117IN PMP MOD CK VLV RLER CLMP 2 SMRTSITE

## (undated) DEVICE — SHEET,DRAPE,53X77,STERILE: Brand: MEDLINE

## (undated) DEVICE — ELECTROSURGICAL PENCIL ROCKER SWITCH NON COATED BLADE ELECTRODE 10 FT (3 M) CORD HOLSTER: Brand: MEGADYNE

## (undated) DEVICE — GLOVE SURG BIOGEL ECLIPSE PF 6 LTX

## (undated) DEVICE — SYRINGE MED 10ML LUERLOCK TIP W/O SFTY DISP

## (undated) DEVICE — BOWL MED L 32OZ PLAS W/ MOLD GRAD EZ OPN PEEL PCH

## (undated) DEVICE — PRESSURE TUBING: Brand: TRUWAVE

## (undated) DEVICE — E-Z CLEAN, NON-STICK, PTFE COATED, ELECTROSURGICAL BLADE ELECTRODE, 2.5 INCH (6.35 CM): Brand: EZ CLEAN

## (undated) DEVICE — SUTURE PERMAHAND SZ 2-0 L30IN NONABSORBABLE BLK SILK W/O A305H

## (undated) DEVICE — DECANTER BAG 9": Brand: MEDLINE INDUSTRIES, INC.

## (undated) DEVICE — SUTURE NONABSORBABLE MONOFILAMENT 4-0 RB-1 36 IN BLU PROLENE 8557H

## (undated) DEVICE — 1LYRTR 16FR10ML100%SIL UMS SNP: Brand: MEDLINE INDUSTRIES, INC.

## (undated) DEVICE — SUTURE VCRL SZ 3-0 L27IN ABSRB UD L26MM SH 1/2 CIR J416H

## (undated) DEVICE — COVER LT HNDL BLU PLAS

## (undated) DEVICE — 3M™ STERI-DRAPE™ INSTRUMENT POUCH 1018: Brand: STERI-DRAPE™

## (undated) DEVICE — SCANLAN® SUTURE BOOT™ INSTRUMENT JAW COVERS - ORIGINAL YELLOW, STANDARD PKG (5 PAIR/CARTRIDGE, 1 CARTRIDGE/PKG): Brand: SCANLAN® SUTURE BOOT™ INSTRUMENT JAW COVERS

## (undated) DEVICE — 20 ML SYRINGE LUER-LOCK TIP: Brand: MONOJECT

## (undated) DEVICE — 3M™ TEGADERM™ TRANSPARENT FILM DRESSING FRAME STYLE, 1624W, 2-3/8 IN X 2-3/4 IN (6 CM X 7 CM), 100/CT 4CT/CASE: Brand: 3M™ TEGADERM™

## (undated) DEVICE — SHUNT CV L30CM DIA3X5MM SIL EXT LOOP FULL SPR REINF SUNDT

## (undated) DEVICE — SUTURE MCRYL SZ 4-0 L27IN ABSRB UD L19MM PS-2 1/2 CIR PRIM Y426H

## (undated) DEVICE — GLOVE SURG SZ 6 L11.2IN FNGR THK9.8MIL STRW LTX POLYMER

## (undated) DEVICE — TRANSDUCER KT INVASIVE BLD PRSS SGL LN 1 CDP PT MT

## (undated) DEVICE — TOWEL,OR,DSP,ST,BLUE,DLX,8/PK,10PK/CS: Brand: MEDLINE

## (undated) DEVICE — SYRINGE WITH HYPODERMIC SAFETY NEEDLE: Brand: MAGELLAN

## (undated) DEVICE — LOOP VES W1.3MM THK0.9MM MINI WHT SIL FLD REPELLENT

## (undated) DEVICE — SUTURE PROL SZ 3-0 L36IN NONABSORBABLE BLU L26MM SH 1/2 CIR 8522H

## (undated) DEVICE — 3M™ TEGADERM™ TRANSPARENT FILM DRESSING FRAME STYLE, 1626, 4 IN X 4-3/4 IN (10 CM X 12 CM), 50/CT 4CT/CASE: Brand: 3M™ TEGADERM™

## (undated) DEVICE — SURE SET-DOUBLE BASIN-LF: Brand: MEDLINE INDUSTRIES, INC.

## (undated) DEVICE — SOLUTION IV SODIUM CHL 0.9% 500 ML INJ FLX CONTAINER

## (undated) DEVICE — SUTURE PERMAHAND SZ 3-0 L30IN NONABSORBABLE BLK SILK BRAID A304H

## (undated) DEVICE — 8F PRUITT F3 OUTLYING SHUNT WITH T-PORT: Brand: PRUITT F3 CAROTID SHUNT

## (undated) DEVICE — GENERAL: Brand: MEDLINE INDUSTRIES, INC.

## (undated) DEVICE — E-Z CLEAN, NON-STICK, PTFE COATED, ELECTROSURGICAL BLADE ELECTRODE, MODIFIED EXTENDED INSULATION, 6.5 INCH (16.5 CM): Brand: MEGADYNE

## (undated) DEVICE — SCANLAN® SURG-I-LOOP® SILICONE LOOPS - RED MINI, 1.5X.88 MM, 16"/40 CM LONG (2/PKG): Brand: SCANLAN® SURG-I-LOOP® SILICONE LOOPS

## (undated) DEVICE — PLEDGET SURG W0.375XL0.062IN THK1.5MM WHT SFT PTFE RECT

## (undated) DEVICE — SUTURE PERMA-HAND SZ 0 L18IN NONABSORBABLE BLK L30MM FSL 678G

## (undated) DEVICE — CANNULA PERF L1.3IN TIP L2MM S STL POLYUR TB ARTOTMY BLB

## (undated) DEVICE — CATHETERIZATION KIT 7 FRX16 CM 3L

## (undated) DEVICE — APPLICATOR PREP 26ML 0.7% IOD POVACRYLEX 74% ISO ALC ST

## (undated) DEVICE — BLADE,CARBON-STEEL,11,STRL,DISPOSABLE,TB: Brand: MEDLINE

## (undated) DEVICE — CLIP SM RED INTERN HMOCLP TITAN LIGATING

## (undated) DEVICE — TOWEL,OR,DSP,ST,WHITE,DLX,4/PK,20PK/CS: Brand: MEDLINE

## (undated) DEVICE — FOGARTY - HYDRAGRIP SURGICAL - CLAMP INSERTS: Brand: FOGARTY SOFTJAW

## (undated) DEVICE — TOWEL,STOP FLAG GOLD N-W: Brand: MEDLINE

## (undated) DEVICE — DUAL LUMEN STOMACH TUBE: Brand: SALEM SUMP

## (undated) DEVICE — SPONGE,PEANUT,XRAY,ST,SM,3/8",5/CARD: Brand: MEDLINE INDUSTRIES, INC.

## (undated) DEVICE — GAUZE,SPONGE,4"X4",16PLY,XRAY,STRL,LF: Brand: MEDLINE

## (undated) DEVICE — PAD,NON-ADHERENT,3X8,STERILE,LF,1/PK: Brand: MEDLINE

## (undated) DEVICE — COVER,TABLE,HEAVY DUTY,77"X90",STRL: Brand: MEDLINE

## (undated) DEVICE — CAUTERY ES L0.5IN FN TIP HI TEMP ACCU-TEMP

## (undated) DEVICE — LOOP VES W13MM THK09MM MINI RED SIL FLD REPELLENT

## (undated) DEVICE — SUTURE VCRL SZ 2-0 L27IN ABSRB UD L26MM SH 1/2 CIR J417H

## (undated) DEVICE — NEEDLE HYPO 16GA L1.5IN PUR POLYPR HUB S STL REG BVL STR

## (undated) DEVICE — PLATE ES AD W 9FT CRD 2

## (undated) DEVICE — BLANKET WRM W40.2XL55.9IN IORT LO BODY + MISTRAL AIR

## (undated) DEVICE — Device

## (undated) DEVICE — PAD,ABDOMINAL,5"X9",ST,LF,25/BX: Brand: MEDLINE INDUSTRIES, INC.

## (undated) DEVICE — 3M™ IOBAN™ 2 ANTIMICROBIAL INCISE DRAPE 6640EZ: Brand: IOBAN™ 2

## (undated) DEVICE — SYRINGE MED 3ML CLR PLAS STD N CTRL LUERLOCK TIP DISP

## (undated) DEVICE — AEGIS 1" DISK 4MM HOLE, PEEL OPEN: Brand: MEDLINE

## (undated) DEVICE — SUTURE PROL SZ 2-0 L42IN NONABSORBABLE BLU CT-1 L36MM 3/8 D9693

## (undated) DEVICE — CLIP LIG M BLU TI HRT SHP WIRE HORZ 600 PER BX

## (undated) DEVICE — LOOP,VESSEL,MAXI,BLUE,2/PK,STERILE: Brand: MEDLINE

## (undated) DEVICE — KIT INTRO SHTH PERC W CHLOREPREP 9FR INTRO FLX

## (undated) DEVICE — AGENT HEMSTAT W2XL4IN FIBRIN SEAL PTCH DSG EVARREST

## (undated) DEVICE — SOLUTION IV 1000ML 0.9% SOD CHL

## (undated) DEVICE — PRESSURE MONITORING SET: Brand: TRUWAVE, VAMP PLUS

## (undated) DEVICE — 9F PRUITT F3 OUTLYING SHUNT WITH T-PORT, EIFU: Brand: PRUITT F3 CAROTID SHUNT

## (undated) DEVICE — PENCIL ES ULT VAC W TELSCP NOSE EZ CLN BLDE 10FT

## (undated) DEVICE — SHEET,T,THYROID,STERILE: Brand: MEDLINE

## (undated) DEVICE — SUTURE PERMAHAND SZ 2-0 L30IN NONABSORBABLE BLK SH L26MM C016D

## (undated) DEVICE — GLOVE SURG SZ 7 CRM LTX FREE POLYISOPRENE POLYMER BEAD ANTI

## (undated) DEVICE — 3M™ IOBAN™ 2 ANTIMICROBIAL INCISE DRAPE 6648EZ: Brand: IOBAN™ 2

## (undated) DEVICE — STAPLER SKIN H3.9MM WIRE DIA0.58MM CRWN 6.9MM 35 STPL ROT

## (undated) DEVICE — 3M™ TEGADERM™ TRANSPARENT FILM DRESSING FRAME STYLE, 1626W, 4 IN X 4-3/4 IN (10 CM X 12 CM), 50/CT 4CT/CASE: Brand: 3M™ TEGADERM™

## (undated) DEVICE — SHEET,DRAPE,40X58,STERILE: Brand: MEDLINE

## (undated) DEVICE — SYRINGE 5ML HYPO INTRLNK W/ VIAL ACCESS

## (undated) DEVICE — BLANKET WRM W29.9XL79.1IN UP BODY FORC AIR MISTRAL-AIR

## (undated) DEVICE — BLADE,CARBON-STEEL,10,STRL,DISPOSABLE,TB: Brand: MEDLINE

## (undated) DEVICE — KIT CATHETER 20GA L12CM ART CUST

## (undated) DEVICE — 4-PORT MANIFOLD: Brand: NEPTUNE 2

## (undated) DEVICE — SUTURE PROL SZ 7-0 L18IN NONABSORBABLE BLU L9.3MM BV-1 3/8 8701H

## (undated) DEVICE — TRAY PREP DRY W/ PREM GLV 2 APPL 6 SPNG 2 UNDPD 1 OVERWRAP

## (undated) DEVICE — JEWISH HOSPITAL TURNOVER KIT: Brand: MEDLINE INDUSTRIES, INC.

## (undated) DEVICE — 3M™ SURGICAL CLIPPER WITH PIVOTING HEAD, 9660, 50/CASE: Brand: 3M™

## (undated) DEVICE — SET PROC STD VOL BOWL SUCT ASMBLY GS FLTR BLD COLLCTN RESVR

## (undated) DEVICE — SYRINGE 20ML LL S/C 50

## (undated) DEVICE — SUTURE N ABSRB MONOFILAMENT 6-0 BV1 30 IN BLU PROLENE EPM8709

## (undated) DEVICE — GEL US 20GM NONIRRITATING OVERWRAPPED FILE PCH TRNSMIT

## (undated) DEVICE — SUTURE PDS II SZ 1 L96IN ABSRB VLT TP-1 L65MM 1/2 CIR Z880G

## (undated) DEVICE — SURGICAL SET UP - SURE SET: Brand: MEDLINE INDUSTRIES, INC.

## (undated) DEVICE — DRESSING GERM DIA1IN CNTR H DIA7MM BLU CHG ANTIMIC PROTCT

## (undated) DEVICE — STANDARD HYPODERMIC NEEDLE,POLYPROPYLENE HUB: Brand: MONOJECT

## (undated) DEVICE — GLOVE SURG SZ 65 L12IN FNGR THK79MIL GRN LTX FREE

## (undated) DEVICE — DRAPE,INSTRUMENT,MAGNETIC,10X16: Brand: MEDLINE

## (undated) DEVICE — BANDAGE,GAUZE,CONFORMING,4"X75",STRL,LF: Brand: MEDLINE